# Patient Record
Sex: FEMALE | Race: WHITE | NOT HISPANIC OR LATINO | Employment: OTHER | URBAN - METROPOLITAN AREA
[De-identification: names, ages, dates, MRNs, and addresses within clinical notes are randomized per-mention and may not be internally consistent; named-entity substitution may affect disease eponyms.]

---

## 2017-01-01 PROBLEM — H66.90 OTITIS MEDIA: Status: ACTIVE | Noted: 2017-01-01

## 2017-03-18 DIAGNOSIS — E03.4 HYPOTHYROIDISM DUE TO ACQUIRED ATROPHY OF THYROID: ICD-10-CM

## 2017-03-19 RX ORDER — LEVOTHYROXINE SODIUM 100 UG/1
TABLET ORAL
Qty: 90 TABLET | Refills: 1 | Status: SHIPPED | OUTPATIENT
Start: 2017-03-19 | End: 2017-08-30 | Stop reason: SDUPTHER

## 2017-03-22 ENCOUNTER — TELEPHONE (OUTPATIENT)
Dept: FAMILY MEDICINE | Facility: CLINIC | Age: 67
End: 2017-03-22

## 2017-03-22 DIAGNOSIS — R73.09 ELEVATED GLUCOSE: ICD-10-CM

## 2017-03-22 DIAGNOSIS — E03.9 HYPOTHYROIDISM, UNSPECIFIED TYPE: Primary | ICD-10-CM

## 2017-03-22 NOTE — TELEPHONE ENCOUNTER
----- Message from Consuelo Atwood sent at 3/22/2017 12:21 PM CDT -----  Contact: Self , Call  [phone]  Doctor appointment and lab have been scheduled.  Please link lab orders to the lab appointment.  Date of doctor appointment:  7/19  Physical or EP:  Epp  Date of lab appointment:  7/12    Comments:  Patient ask that when the [employer] mail order is received for her SYNTHROID 100 mcg tablet to give her enough for 90 days until her appointment. She ask that the nurse call her.

## 2017-03-22 NOTE — TELEPHONE ENCOUNTER
Patient advised that the Rx for Levothyroxine was sent on 03/19/17 for 90 day with 1 refill.     Orders in and linked.

## 2017-04-07 ENCOUNTER — OFFICE VISIT (OUTPATIENT)
Dept: FAMILY MEDICINE | Facility: CLINIC | Age: 67
End: 2017-04-07
Payer: MEDICARE

## 2017-04-07 VITALS
HEART RATE: 80 BPM | HEIGHT: 65 IN | WEIGHT: 188.69 LBS | DIASTOLIC BLOOD PRESSURE: 82 MMHG | SYSTOLIC BLOOD PRESSURE: 120 MMHG | TEMPERATURE: 98 F | BODY MASS INDEX: 31.44 KG/M2

## 2017-04-07 DIAGNOSIS — Z91.09 ENVIRONMENTAL ALLERGIES: Primary | ICD-10-CM

## 2017-04-07 PROCEDURE — 99214 OFFICE O/P EST MOD 30 MIN: CPT | Mod: S$GLB,,, | Performed by: NURSE PRACTITIONER

## 2017-04-07 PROCEDURE — 99999 PR PBB SHADOW E&M-EST. PATIENT-LVL IV: CPT | Mod: PBBFAC,,, | Performed by: NURSE PRACTITIONER

## 2017-04-07 PROCEDURE — 1126F AMNT PAIN NOTED NONE PRSNT: CPT | Mod: S$GLB,,, | Performed by: NURSE PRACTITIONER

## 2017-04-07 PROCEDURE — 1159F MED LIST DOCD IN RCRD: CPT | Mod: S$GLB,,, | Performed by: NURSE PRACTITIONER

## 2017-04-07 PROCEDURE — 1157F ADVNC CARE PLAN IN RCRD: CPT | Mod: S$GLB,,, | Performed by: NURSE PRACTITIONER

## 2017-04-07 PROCEDURE — 1160F RVW MEDS BY RX/DR IN RCRD: CPT | Mod: S$GLB,,, | Performed by: NURSE PRACTITIONER

## 2017-04-07 RX ORDER — TRIAMCINOLONE ACETONIDE 55 UG/1
2 SPRAY, METERED NASAL DAILY
Qty: 16 G | Refills: 3 | Status: SHIPPED | OUTPATIENT
Start: 2017-04-07 | End: 2018-01-31 | Stop reason: SDUPTHER

## 2017-04-07 RX ORDER — MONTELUKAST SODIUM 10 MG/1
10 TABLET ORAL NIGHTLY
Qty: 30 TABLET | Refills: 0 | Status: SHIPPED | OUTPATIENT
Start: 2017-04-07 | End: 2017-05-07

## 2017-04-07 NOTE — PROGRESS NOTES
"Subjective:       Patient ID: Lucita Saravia is a 67 y.o. female.    Chief Complaint: Nasal Congestion; Sore Throat; Dizziness; and Pressure Behind the Eyes    HPI Comments: Pt here c/o nasal congestion, sore throat.  Pt states sx x 1 week, after working out in her yard by her oak tree.  Pt occasionally takes Claritin but hasn't been taking daily and has not been using her Flonase- states that it is irritating to her nose      Sore Throat    Associated symptoms include congestion and headaches. Pertinent negatives include no abdominal pain, coughing, diarrhea, drooling, ear discharge, ear pain, neck pain, trouble swallowing or vomiting.   Dizziness:    Associated symptoms: headaches and nausea.no ear pain, no fever, no tinnitus, no vomiting, no light-headedness and no chest pain.    Past Medical History:   Diagnosis Date    Female infertility NEC     History of degenerative disc disease     neck    Hypothyroidism      Past Surgical History:   Procedure Laterality Date    APPENDECTOMY      EYE SURGERY      LASIK    LASIK      PELVIC LAPAROSCOPY      fertillity    TONSILLECTOMY, ADENOIDECTOMY      TOTAL ABDOMINAL HYSTERECTOMY      endometrisosis     Social History     Social History Narrative    Single, lives alone, retired -but works PT serving at [employer]     Family History   Problem Relation Age of Onset    Alzheimer's disease Mother     Stroke Mother     COPD Father     Cancer Sister      breast cancer    Alzheimer's disease Sister     Kidney nephrosis Daughter     Cancer Paternal Grandmother      rectal cancer     Vitals:    04/07/17 0952 04/07/17 1004   BP: (!) 150/86 120/82   Pulse: 80    Temp: 97.8 °F (36.6 °C)    TempSrc: Oral    Weight: 85.6 kg (188 lb 11.4 oz)    Height: 5' 4.5" (1.638 m)    PainSc: 0-No pain      Outpatient Encounter Prescriptions as of 4/7/2017   Medication Sig Dispense Refill    b complex vitamins capsule Take 1 capsule by mouth once daily.      CYANOCOBALAMIN, " "VITAMIN B-12, (VITAMIN B-12 SL) Place under the tongue.      ERGOCALCIFEROL, VITAMIN D2, (VITAMIN D ORAL) Take 400 mg by mouth.      fish oil-omega-3 fatty acids 300-1,000 mg capsule Take 2 g by mouth once daily.      fluticasone (FLONASE) 50 mcg/actuation nasal spray       loratadine (CLARITIN) 10 mg tablet Take 10 mg by mouth once daily.      multivitamin capsule Take 1 capsule by mouth once daily.      SYNTHROID 100 mcg tablet TAKE 1 TABLET EVERY MORNING 90 tablet 1    VIT C/VIT E/LUTEIN/MIN/OMEGA-3 (OCUVITE ORAL) Take by mouth.      fexofenadine-pseudoephedrine (ALLEGRA-D 24) 180-240 mg per 24 hr tablet Take 1 tablet by mouth once daily.      montelukast (SINGULAIR) 10 mg tablet Take 1 tablet (10 mg total) by mouth every evening. 30 tablet 0    promethazine (PHENERGAN) 6.25 mg/5 mL syrup Take 5 mLs (6.25 mg total) by mouth every 6 (six) hours as needed for Nausea. 240 mL 0    triamcinolone (NASACORT) 55 mcg nasal inhaler 2 sprays by Nasal route once daily. 16 g 3     No facility-administered encounter medications on file as of 4/7/2017.      Wt Readings from Last 3 Encounters:   04/07/17 85.6 kg (188 lb 11.4 oz)   12/29/16 83.2 kg (183 lb 6.8 oz)   07/12/16 84.7 kg (186 lb 11.7 oz)     Last 3 sets of Vitals    Vitals - 1 value per visit 7/12/2016 12/29/2016 4/7/2017   SYSTOLIC 126 138 120   DIASTOLIC 82 76 82   PULSE 64 80 80   TEMPERATURE 97.8 97.8 97.8   Weight (lb) 186.73 183.42 188.71   Weight (kg) 84.7 83.2 85.6   HEIGHT 5' 5.5" 5' 4.5" 5' 4.5"   BODY MASS INDEX 30.6 31 31.89   VISIT REPORT - - -   Pain Score  4 0 0     No results found for: CBC  Review of Systems   Constitutional: Negative for chills, fatigue and fever.   HENT: Positive for congestion, postnasal drip, rhinorrhea, sinus pressure and sore throat. Negative for drooling, ear discharge, ear pain, sneezing, tinnitus, trouble swallowing and voice change.    Respiratory: Negative for cough and chest tightness.    Cardiovascular: " Negative for chest pain.   Gastrointestinal: Positive for nausea. Negative for abdominal pain, diarrhea and vomiting.   Musculoskeletal: Negative for arthralgias, myalgias, neck pain and neck stiffness.   Skin: Negative for rash.   Neurological: Positive for dizziness and headaches. Negative for facial asymmetry and light-headedness.   Hematological: Negative for adenopathy.   Psychiatric/Behavioral: Negative for sleep disturbance.       Objective:      Physical Exam   Constitutional: She appears well-developed and well-nourished. No distress.   HENT:   Nose: Mucosal edema and rhinorrhea present. Right sinus exhibits no maxillary sinus tenderness and no frontal sinus tenderness. Left sinus exhibits no maxillary sinus tenderness and no frontal sinus tenderness.   Eyes: Conjunctivae are normal. Pupils are equal, round, and reactive to light. Right eye exhibits no discharge. Left eye exhibits no discharge.   Cardiovascular: Normal rate, regular rhythm, normal heart sounds and intact distal pulses.    Pulmonary/Chest: Effort normal and breath sounds normal. No stridor. No respiratory distress. She has no wheezes.   Abdominal: Soft. She exhibits no distension.   Lymphadenopathy:     She has no cervical adenopathy.   Neurological: She is alert.   Skin: Skin is warm and dry. No rash noted. She is not diaphoretic. No erythema. No pallor.   Psychiatric: She has a normal mood and affect. Her behavior is normal. Thought content normal.   Nursing note and vitals reviewed.      Assessment:       1. Environmental allergies        Plan:       Try Nasacort to see if less irritating that Flonase  Singulair at night  Continue Claritin and sudafed  Increase oral fluids  Lucita was seen today for nasal congestion, sore throat, dizziness and pressure behind the eyes.    Diagnoses and all orders for this visit:    Environmental allergies  -     triamcinolone (NASACORT) 55 mcg nasal inhaler; 2 sprays by Nasal route once daily.  -      montelukast (SINGULAIR) 10 mg tablet; Take 1 tablet (10 mg total) by mouth every evening.      Patient Instructions       Causes of Nasal Allergies  Nasal allergies are most commonly caused by one or more of 4 kinds of allergens: pollen (which causes seasonal allergies), house-dust mites, mold, and animals. Other substances, called irritants, can bother the nose and make allergy symptoms worse.    Pollen  Plants reproduce by moving tiny grains of pollen from plant to plant. Some pollen is carried by bees, and some is blown by the wind. Its the wind-blown pollen that causes nasal allergies. The amount of pollen in the air varies from season to season.  House-dust mites  House-dust mites are tiny bugs too small to see. They can live in mattresses, blankets, stuffed toys, carpets, and curtains. The droppings of these mites are a common indoor cause of nasal allergies.  Mold  Mold loves dark, damp areas. It tends to grow in bathrooms, basements, refrigerators, and in the soil of houseplants. Mold reproduces by sending tiny grains called spores into the air. If these spores are breathed in, they can cause a nasal allergic reaction.  Animals  Pets, such as cats, dogs, birds, horses, and rabbits, are common causes of nasal allergies. Flakes of skin (dander), saliva left on fur when an animal cleans itself, urine in litter boxes and cages, and feathers can all cause nasal allergies.  Irritants make allergies worse  Although irritants dont cause nasal allergies, they can make allergy symptoms worse. Cigarette smoke, perfume, aerosol sprays, smoke from wood stoves or fireplaces, car exhaust, and strong odors are examples of irritants.   Date Last Reviewed: 9/1/2016  © 6748-9389 [employer]. [address] [place] [address], [place] [address]. All rights reserved. This information is not intended as a substitute for professional medical care. Always follow your healthcare professional's instructions.

## 2017-04-07 NOTE — MR AVS SNAPSHOT
[facility]  [address] [facility], [address]  [facility] [address]  Phone: [phone]  Fax: [phone]                  Lucita Saravia   2017 10:00 AM   Office Visit    Description:  Female : 1950   Provider:  MARTHA Champagne   Department:  [facility]           Reason for Visit     Nasal Congestion     Sore Throat     Dizziness     Pressure Behind the Eyes           Diagnoses this Visit        Comments    Environmental allergies    -  Primary            To Do List           Future Appointments        Provider Department Dept Phone    2017 8:00 AM LAB, [facility] - Laboratory [phone]    2017 2:00 PM Grace Camargo,  [facility] [phone]      Goals (5 Years of Data)     None       These Medications        Disp Refills Start End    triamcinolone (NASACORT) 55 mcg nasal inhaler 16 g 3 2017     2 sprays by Nasal route once daily. - Nasal    Pharmacy: [facility] - [place], [place] - [address] [facility] #: [phone]       montelukast (SINGULAIR) 10 mg tablet 30 tablet 0 2017    Take 1 tablet (10 mg total) by mouth every evening. - Oral    Pharmacy: [facility] - [place], [place] - [address] [facility] #: [phone]         [facility]     [facility] -  Assistance  Unless otherwise directed by your provider, please contact [facility], our nurse care line that is available for  assistance.     Registered nurses in the [facility] provide: appointment scheduling, clinical advisement, health education, and other advisory services.  Call: [phone] (toll free)               Medications           Message regarding Medications     Verify the changes and/or additions to your medication regime listed below are the same as discussed with your clinician today.  If any of these changes or additions are incorrect, please notify your healthcare  "provider.        START taking these NEW medications        Refills    triamcinolone (NASACORT) 55 mcg nasal inhaler 3    Si sprays by Nasal route once daily.    Class: Normal    Route: Nasal    montelukast (SINGULAIR) 10 mg tablet 0    Sig: Take 1 tablet (10 mg total) by mouth every evening.    Class: Normal    Route: Oral           Verify that the below list of medications is an accurate representation of the medications you are currently taking.  If none reported, the list may be blank. If incorrect, please contact your healthcare provider. Carry this list with you in case of emergency.           Current Medications     b complex vitamins capsule Take 1 capsule by mouth once daily.    CYANOCOBALAMIN, VITAMIN B-12, (VITAMIN B-12 SL) Place under the tongue.    ERGOCALCIFEROL, VITAMIN D2, (VITAMIN D ORAL) Take 400 mg by mouth.    fish oil-omega-3 fatty acids 300-1,000 mg capsule Take 2 g by mouth once daily.    fluticasone (FLONASE) 50 mcg/actuation nasal spray     loratadine (CLARITIN) 10 mg tablet Take 10 mg by mouth once daily.    multivitamin capsule Take 1 capsule by mouth once daily.    SYNTHROID 100 mcg tablet TAKE 1 TABLET EVERY MORNING    VIT C/VIT E/LUTEIN/MIN/OMEGA-3 (OCUVITE ORAL) Take by mouth.    fexofenadine-pseudoephedrine (ALLEGRA-D 24) 180-240 mg per 24 hr tablet Take 1 tablet by mouth once daily.    montelukast (SINGULAIR) 10 mg tablet Take 1 tablet (10 mg total) by mouth every evening.    promethazine (PHENERGAN) 6.25 mg/5 mL syrup Take 5 mLs (6.25 mg total) by mouth every 6 (six) hours as needed for Nausea.    triamcinolone (NASACORT) 55 mcg nasal inhaler 2 sprays by Nasal route once daily.           Clinical Reference Information           Your Vitals Were     BP Pulse Temp    120/82 (BP Location: Right arm, Patient Position: Sitting, BP Method: Manual) 80 97.8 °F (36.6 °C) (Oral)    Height Weight BMI    5' 4.5" (1.638 m) 85.6 kg (188 lb 11.4 oz) 31.89 kg/m2      Blood Pressure          Most " Recent Value    BP  120/82      Allergies as of 4/7/2017     No Known Allergies      Immunizations Administered on Date of Encounter - 4/7/2017     None      Instructions      Causes of Nasal Allergies  Nasal allergies are most commonly caused by one or more of 4 kinds of allergens: pollen (which causes seasonal allergies), house-dust mites, mold, and animals. Other substances, called irritants, can bother the nose and make allergy symptoms worse.    Pollen  Plants reproduce by moving tiny grains of pollen from plant to plant. Some pollen is carried by bees, and some is blown by the wind. Its the wind-blown pollen that causes nasal allergies. The amount of pollen in the air varies from season to season.  House-dust mites  House-dust mites are tiny bugs too small to see. They can live in mattresses, blankets, stuffed toys, carpets, and curtains. The droppings of these mites are a common indoor cause of nasal allergies.  Mold  Mold loves dark, damp areas. It tends to grow in bathrooms, basements, refrigerators, and in the soil of houseplants. Mold reproduces by sending tiny grains called spores into the air. If these spores are breathed in, they can cause a nasal allergic reaction.  Animals  Pets, such as cats, dogs, birds, horses, and rabbits, are common causes of nasal allergies. Flakes of skin (dander), saliva left on fur when an animal cleans itself, urine in litter boxes and cages, and feathers can all cause nasal allergies.  Irritants make allergies worse  Although irritants dont cause nasal allergies, they can make allergy symptoms worse. Cigarette smoke, perfume, aerosol sprays, smoke from wood stoves or fireplaces, car exhaust, and strong odors are examples of irritants.   Date Last Reviewed: 9/1/2016  © 6226-0971 [employer]. [address] [place] [address], [place] [address]. All rights reserved. This information is not intended as a substitute for professional medical care. Always follow your  healthcare professional's instructions.             Language Assistance Services     ATTENTION: Language assistance services are available, free of charge. Please call [phone].      ATENCIÓN: Si habla [place], tiene a blancas disposición servicios gratuitos de asistencia lingüística. Llame al [phone].     CHÚ Ý: N?u b?n nói Ti?ng Vi?t, có các d?ch v? h? tr? ngôn ng? mi?n phí dành cho b?n. G?i s? [phone].         [facility] complies with applicable Federal civil rights laws and does not discriminate on the basis of race, color, national origin, age, disability, or sex.

## 2017-04-07 NOTE — PATIENT INSTRUCTIONS
Causes of Nasal Allergies  Nasal allergies are most commonly caused by one or more of 4 kinds of allergens: pollen (which causes seasonal allergies), house-dust mites, mold, and animals. Other substances, called irritants, can bother the nose and make allergy symptoms worse.    Pollen  Plants reproduce by moving tiny grains of pollen from plant to plant. Some pollen is carried by bees, and some is blown by the wind. Its the wind-blown pollen that causes nasal allergies. The amount of pollen in the air varies from season to season.  House-dust mites  House-dust mites are tiny bugs too small to see. They can live in mattresses, blankets, stuffed toys, carpets, and curtains. The droppings of these mites are a common indoor cause of nasal allergies.  Mold  Mold loves dark, damp areas. It tends to grow in bathrooms, basements, refrigerators, and in the soil of houseplants. Mold reproduces by sending tiny grains called spores into the air. If these spores are breathed in, they can cause a nasal allergic reaction.  Animals  Pets, such as cats, dogs, birds, horses, and rabbits, are common causes of nasal allergies. Flakes of skin (dander), saliva left on fur when an animal cleans itself, urine in litter boxes and cages, and feathers can all cause nasal allergies.  Irritants make allergies worse  Although irritants dont cause nasal allergies, they can make allergy symptoms worse. Cigarette smoke, perfume, aerosol sprays, smoke from wood stoves or fireplaces, car exhaust, and strong odors are examples of irritants.   Date Last Reviewed: 9/1/2016  © 0690-4823 [employer]. [address] [place] [address], [place] [address]. All rights reserved. This information is not intended as a substitute for professional medical care. Always follow your healthcare professional's instructions.

## 2017-04-08 DIAGNOSIS — H66.90 OTITIS, UNSPECIFIED LATERALITY: ICD-10-CM

## 2017-04-08 DIAGNOSIS — J32.9 SINUSITIS, UNSPECIFIED CHRONICITY, UNSPECIFIED LOCATION: ICD-10-CM

## 2017-04-09 RX ORDER — AMOXICILLIN 875 MG/1
TABLET, FILM COATED ORAL
Qty: 20 TABLET | Refills: 0 | OUTPATIENT
Start: 2017-04-09

## 2017-04-10 DIAGNOSIS — J32.9 SINUSITIS, UNSPECIFIED CHRONICITY, UNSPECIFIED LOCATION: ICD-10-CM

## 2017-04-10 DIAGNOSIS — H66.90 OTITIS, UNSPECIFIED LATERALITY: ICD-10-CM

## 2017-04-10 RX ORDER — AMOXICILLIN 875 MG/1
TABLET, FILM COATED ORAL
Qty: 20 TABLET | Refills: 0 | OUTPATIENT
Start: 2017-04-10

## 2017-04-11 DIAGNOSIS — J32.9 SINUSITIS, UNSPECIFIED CHRONICITY, UNSPECIFIED LOCATION: ICD-10-CM

## 2017-04-11 DIAGNOSIS — H66.90 OTITIS, UNSPECIFIED LATERALITY: ICD-10-CM

## 2017-04-11 RX ORDER — AMOXICILLIN 875 MG/1
TABLET, FILM COATED ORAL
Qty: 20 TABLET | Refills: 0 | OUTPATIENT
Start: 2017-04-11

## 2017-04-27 ENCOUNTER — OFFICE VISIT (OUTPATIENT)
Dept: FAMILY MEDICINE | Facility: CLINIC | Age: 67
End: 2017-04-27
Payer: MEDICARE

## 2017-04-27 VITALS
OXYGEN SATURATION: 99 % | SYSTOLIC BLOOD PRESSURE: 120 MMHG | WEIGHT: 187.81 LBS | HEIGHT: 66 IN | DIASTOLIC BLOOD PRESSURE: 68 MMHG | BODY MASS INDEX: 30.18 KG/M2 | HEART RATE: 76 BPM | TEMPERATURE: 98 F

## 2017-04-27 DIAGNOSIS — J06.9 UPPER RESPIRATORY TRACT INFECTION, UNSPECIFIED TYPE: Primary | ICD-10-CM

## 2017-04-27 PROCEDURE — 1159F MED LIST DOCD IN RCRD: CPT | Mod: S$GLB,,, | Performed by: NURSE PRACTITIONER

## 2017-04-27 PROCEDURE — 99214 OFFICE O/P EST MOD 30 MIN: CPT | Mod: S$GLB,,, | Performed by: NURSE PRACTITIONER

## 2017-04-27 PROCEDURE — 1160F RVW MEDS BY RX/DR IN RCRD: CPT | Mod: S$GLB,,, | Performed by: NURSE PRACTITIONER

## 2017-04-27 PROCEDURE — 99999 PR PBB SHADOW E&M-EST. PATIENT-LVL IV: CPT | Mod: PBBFAC,,, | Performed by: NURSE PRACTITIONER

## 2017-04-27 NOTE — PATIENT INSTRUCTIONS
Continue Claritin and  Sudafed    OK to use Afrin for 3-5 days    OK to take the Promethazine syrup for cough    Call if not better by Monday

## 2017-04-27 NOTE — PROGRESS NOTES
"Subjective:       Patient ID: Lucita Saravia is a 67 y.o. female.    Chief Complaint: Cough and Sinus Problem    HPI Comments: Pt here c/o cough that started within the week and nasal congestion.  Pt denies sore throat, n/v/d/fever or chills.  Pt has been taking Claritin, Sudafed and it is helping.        Cough   Associated symptoms include postnasal drip and rhinorrhea. Pertinent negatives include no chest pain, chills, fever, headaches, rash, sore throat or shortness of breath.   Sinus Problem   Associated symptoms include congestion and coughing. Pertinent negatives include no chills, headaches, neck pain, shortness of breath, sinus pressure or sore throat.     Past Medical History:   Diagnosis Date    Female infertility NEC     History of degenerative disc disease     neck    Hypothyroidism      Past Surgical History:   Procedure Laterality Date    APPENDECTOMY      EYE SURGERY      LASIK    LASIK      PELVIC LAPAROSCOPY      fertillity    TONSILLECTOMY, ADENOIDECTOMY      TOTAL ABDOMINAL HYSTERECTOMY      endometrisosis     Social History     Social History Narrative    Single, lives alone, retired -but works PT serving at [employer]     Family History   Problem Relation Age of Onset    Alzheimer's disease Mother     Stroke Mother     COPD Father     Cancer Sister      breast cancer    Alzheimer's disease Sister     Kidney nephrosis Daughter     Cancer Paternal Grandmother      rectal cancer     Vitals:    04/27/17 1503   BP: 120/68   Pulse: 76   Temp: 98.4 °F (36.9 °C)   TempSrc: Oral   Weight: 85.2 kg (187 lb 13.3 oz)   Height: 5' 5.5" (1.664 m)     Outpatient Encounter Prescriptions as of 4/27/2017   Medication Sig Dispense Refill    b complex vitamins capsule Take 1 capsule by mouth once daily.      CYANOCOBALAMIN, VITAMIN B-12, (VITAMIN B-12 SL) Place under the tongue.      ERGOCALCIFEROL, VITAMIN D2, (VITAMIN D ORAL) Take 400 mg by mouth.      fish oil-omega-3 fatty acids 300-1,000 mg " "capsule Take 2 g by mouth once daily.      fluticasone (FLONASE) 50 mcg/actuation nasal spray       montelukast (SINGULAIR) 10 mg tablet Take 1 tablet (10 mg total) by mouth every evening. 30 tablet 0    multivitamin capsule Take 1 capsule by mouth once daily.      SYNTHROID 100 mcg tablet TAKE 1 TABLET EVERY MORNING 90 tablet 1    triamcinolone (NASACORT) 55 mcg nasal inhaler 2 sprays by Nasal route once daily. 16 g 3    VIT C/VIT E/LUTEIN/MIN/OMEGA-3 (OCUVITE ORAL) Take by mouth.      fexofenadine-pseudoephedrine (ALLEGRA-D 24) 180-240 mg per 24 hr tablet Take 1 tablet by mouth once daily.      loratadine (CLARITIN) 10 mg tablet Take 10 mg by mouth once daily.      promethazine (PHENERGAN) 6.25 mg/5 mL syrup Take 5 mLs (6.25 mg total) by mouth every 6 (six) hours as needed for Nausea. 240 mL 0     No facility-administered encounter medications on file as of 4/27/2017.      Wt Readings from Last 3 Encounters:   04/27/17 85.2 kg (187 lb 13.3 oz)   04/07/17 85.6 kg (188 lb 11.4 oz)   12/29/16 83.2 kg (183 lb 6.8 oz)     Last 3 sets of Vitals    Vitals - 1 value per visit 12/29/2016 4/7/2017 4/27/2017   SYSTOLIC 138 120 120   DIASTOLIC 76 82 68   PULSE 80 80 76   TEMPERATURE 97.8 97.8 98.4   Weight (lb) 183.42 188.71 187.83   Weight (kg) 83.2 85.6 85.2   HEIGHT 5' 4.5" 5' 4.5" 5' 5.5"   BODY MASS INDEX 31 31.89 30.78   VISIT REPORT - - -   Pain Score  0 0 -     No results found for: CBC  Review of Systems   Constitutional: Negative for chills, fatigue and fever.   HENT: Positive for congestion, postnasal drip and rhinorrhea. Negative for drooling, sinus pressure, sore throat, tinnitus, trouble swallowing and voice change.    Respiratory: Positive for cough. Negative for shortness of breath.    Cardiovascular: Negative for chest pain.   Gastrointestinal: Negative for abdominal pain, constipation, diarrhea, nausea and vomiting.   Musculoskeletal: Negative for arthralgias, back pain and neck pain.   Skin: " Negative for rash.   Neurological: Negative for dizziness, facial asymmetry and headaches.   Hematological: Negative for adenopathy.   Psychiatric/Behavioral: Negative for sleep disturbance.       Objective:      Physical Exam   Constitutional: She is oriented to person, place, and time. She appears well-developed and well-nourished. No distress.   HENT:   Head: Normocephalic and atraumatic.   Right Ear: External ear normal.   Left Ear: External ear normal.   Nose: Mucosal edema and rhinorrhea present.   Mouth/Throat: Oropharynx is clear and moist. No oropharyngeal exudate.   Clear rhinorrhea     Eyes: Pupils are equal, round, and reactive to light. Right eye exhibits no discharge. Left eye exhibits no discharge.   Neck: Normal range of motion.   Cardiovascular: Normal rate, regular rhythm, normal heart sounds and intact distal pulses.    Pulmonary/Chest: Effort normal and breath sounds normal. No stridor. No respiratory distress.   Abdominal: Soft.   Lymphadenopathy:     She has no cervical adenopathy.   Neurological: She is alert and oriented to person, place, and time.   Skin: Skin is warm and dry. No rash noted. She is not diaphoretic. No erythema. No pallor.   Psychiatric: She has a normal mood and affect. Her behavior is normal. Judgment and thought content normal.   Nursing note and vitals reviewed.      Assessment:       1. Upper respiratory tract infection, unspecified type        Plan:       Lucita was seen today for cough and sinus problem.    Diagnoses and all orders for this visit:    Upper respiratory tract infection, unspecified type  Comments:  improving on it's own        Patient Instructions   Continue Claritin and  Sudafed    OK to use Afrin for 3-5 days    OK to take the Promethazine syrup for cough    Call if not better by Monday

## 2017-04-27 NOTE — MR AVS SNAPSHOT
[facility]  [address] [facility], [address]  [facility] [address]  Phone: [phone]  Fax: [phone]                  Lucita Saravia   2017 3:00 PM   Office Visit    Description:  Female : 1950   Provider:  MARTHA Champagne   Department:  [facility]           Reason for Visit     Cough     Sinus Problem                To Do List           Future Appointments        Provider Department Dept Phone    2017 8:00 AM LAB, [facility] [phone]    2017 2:00 PM Grace Camargo DO [facility] [phone]      Goals (5 Years of Data)     None      Follow-Up and Disposition     Return if symptoms worsen or fail to improve.      [facility] On Call     [facility] On Call Nurse Care Line -  Assistance  Unless otherwise directed by your provider, please contact [facility], our nurse care line that is available for  assistance.     Registered nurses in the [facility] provide: appointment scheduling, clinical advisement, health education, and other advisory services.  Call: [phone] (toll free)               Medications           Message regarding Medications     Verify the changes and/or additions to your medication regime listed below are the same as discussed with your clinician today.  If any of these changes or additions are incorrect, please notify your healthcare provider.             Verify that the below list of medications is an accurate representation of the medications you are currently taking.  If none reported, the list may be blank. If incorrect, please contact your healthcare provider. Carry this list with you in case of emergency.           Current Medications     b complex vitamins capsule Take 1 capsule by mouth once daily.    CYANOCOBALAMIN, VITAMIN B-12, (VITAMIN B-12 SL) Place under the tongue.    ERGOCALCIFEROL, VITAMIN D2, (VITAMIN D ORAL) Take 400 mg by mouth.     "fish oil-omega-3 fatty acids 300-1,000 mg capsule Take 2 g by mouth once daily.    fluticasone (FLONASE) 50 mcg/actuation nasal spray     montelukast (SINGULAIR) 10 mg tablet Take 1 tablet (10 mg total) by mouth every evening.    multivitamin capsule Take 1 capsule by mouth once daily.    SYNTHROID 100 mcg tablet TAKE 1 TABLET EVERY MORNING    triamcinolone (NASACORT) 55 mcg nasal inhaler 2 sprays by Nasal route once daily.    VIT C/VIT E/LUTEIN/MIN/OMEGA-3 (OCUVITE ORAL) Take by mouth.    fexofenadine-pseudoephedrine (ALLEGRA-D 24) 180-240 mg per 24 hr tablet Take 1 tablet by mouth once daily.    loratadine (CLARITIN) 10 mg tablet Take 10 mg by mouth once daily.    promethazine (PHENERGAN) 6.25 mg/5 mL syrup Take 5 mLs (6.25 mg total) by mouth every 6 (six) hours as needed for Nausea.           Clinical Reference Information           Your Vitals Were     BP Pulse Temp Height Weight BMI    120/68 76 98.4 °F (36.9 °C) (Oral) 5' 5.5" (1.664 m) 85.2 kg (187 lb 13.3 oz) 30.78 kg/m2      Blood Pressure          Most Recent Value    BP  120/68      Allergies as of 4/27/2017     No Known Allergies      Immunizations Administered on Date of Encounter - 4/27/2017     None      Instructions    Continue Claritin and  Sudafed    OK to use Afrin for 3-5 days    OK to take the Promethazine syrup for cough    Call if not better by Monday           Language Assistance Services     ATTENTION: Language assistance services are available, free of charge. Please call [phone].      ATENCIÓN: Si habla español, tiene a blancas disposición servicios gratuitos de asistencia lingüística. Llleopoldo al [phone].     JACKLYN Ý: N?u b?n nói Ti?ng Vi?t, có các d?ch v? h? tr? ngôn ng? mi?n phí dành cho b?n. G?i s? [phone].         [facility] complies with applicable Federal civil rights laws and does not discriminate on the basis of race, color, national origin, age, disability, or sex.        "

## 2017-05-25 ENCOUNTER — OFFICE VISIT (OUTPATIENT)
Dept: INTERNAL MEDICINE | Facility: CLINIC | Age: 67
End: 2017-05-25
Payer: MEDICARE

## 2017-05-25 VITALS
WEIGHT: 184.75 LBS | SYSTOLIC BLOOD PRESSURE: 138 MMHG | HEIGHT: 66 IN | HEART RATE: 80 BPM | DIASTOLIC BLOOD PRESSURE: 86 MMHG | BODY MASS INDEX: 29.69 KG/M2 | RESPIRATION RATE: 10 BRPM

## 2017-05-25 DIAGNOSIS — G47.00 INSOMNIA, UNSPECIFIED TYPE: ICD-10-CM

## 2017-05-25 DIAGNOSIS — Z91.09 ENVIRONMENTAL ALLERGIES: Primary | ICD-10-CM

## 2017-05-25 PROCEDURE — 99999 PR PBB SHADOW E&M-EST. PATIENT-LVL III: CPT | Mod: PBBFAC,,, | Performed by: INTERNAL MEDICINE

## 2017-05-25 PROCEDURE — 99214 OFFICE O/P EST MOD 30 MIN: CPT | Mod: 59,S$GLB,, | Performed by: INTERNAL MEDICINE

## 2017-05-25 PROCEDURE — 1126F AMNT PAIN NOTED NONE PRSNT: CPT | Mod: S$GLB,,, | Performed by: INTERNAL MEDICINE

## 2017-05-25 PROCEDURE — 96372 THER/PROPH/DIAG INJ SC/IM: CPT | Mod: S$GLB,,, | Performed by: INTERNAL MEDICINE

## 2017-05-25 PROCEDURE — 1159F MED LIST DOCD IN RCRD: CPT | Mod: S$GLB,,, | Performed by: INTERNAL MEDICINE

## 2017-05-25 RX ORDER — DESLORATADINE 5 MG/1
5 TABLET ORAL DAILY
Qty: 30 TABLET | Refills: 5 | Status: SHIPPED | OUTPATIENT
Start: 2017-05-25 | End: 2018-04-20

## 2017-05-25 RX ORDER — TRIAMCINOLONE ACETONIDE 40 MG/ML
40 INJECTION, SUSPENSION INTRA-ARTICULAR; INTRAMUSCULAR
Status: COMPLETED | OUTPATIENT
Start: 2017-05-25 | End: 2017-05-25

## 2017-05-25 RX ORDER — MONTELUKAST SODIUM 10 MG/1
10 TABLET ORAL NIGHTLY
Qty: 30 TABLET | Refills: 5 | Status: SHIPPED | OUTPATIENT
Start: 2017-05-25 | End: 2017-06-24

## 2017-05-25 RX ORDER — AZELASTINE 1 MG/ML
1 SPRAY, METERED NASAL 2 TIMES DAILY
Qty: 30 ML | Refills: 5 | Status: SHIPPED | OUTPATIENT
Start: 2017-05-25 | End: 2018-01-31

## 2017-05-25 RX ADMIN — TRIAMCINOLONE ACETONIDE 40 MG: 40 INJECTION, SUSPENSION INTRA-ARTICULAR; INTRAMUSCULAR at 03:05

## 2017-05-25 NOTE — PROGRESS NOTES
Subjective:       Patient ID: Lucita Saravia is a 67 y.o. female.    Chief Complaint: Otalgia and Cough    HPI     67-year-old female here for evaluation of a ear infection and coughing.  She has felt tired and saw the doctor multiple times since January. She has had URIs.  She was treated with amoxil.  She has seen the NP at [place].  She was told it was a virus.  She feels tired.  Her right ear is bothering her.  She has been given an NSAIDs as well as nasocort.  She has been on singulair as well.  This helped to take the pressure from her sinuses.  She has taken sudafed and claritin as well.  She was told that she has a dysfunction eustachian tube by an ENT.  She has had fatigue that resolved after seeing Dr. Larson.  She felt better until April when she saw the NP.  She reports that she sleeps 5-8 hours a night.  She does this more than 8 hours.  She does this 4 times in a week.  She has been doing this the last year or so.  She sometimes takes melatonin and sometimes not.  She reports that reading puts her to sleep.  She walks a little bit.     Review of Systems    Objective:      Physical Exam   Constitutional: She is oriented to person, place, and time. She appears well-developed and well-nourished.   HENT:   Head: Normocephalic and atraumatic.   Mouth/Throat: No oropharyngeal exudate.   Eyes: EOM are normal. Pupils are equal, round, and reactive to light. Right eye exhibits no discharge. Left eye exhibits no discharge. No scleral icterus.   Neck: Normal range of motion. Neck supple. No tracheal deviation present. No thyromegaly present.   Cardiovascular: Normal rate, regular rhythm and normal heart sounds.  Exam reveals no gallop and no friction rub.    No murmur heard.  Pulmonary/Chest: Effort normal and breath sounds normal. No respiratory distress. She has no wheezes. She has no rales. She exhibits no tenderness.   Abdominal: Soft. Bowel sounds are normal. She exhibits no distension and no mass. There  is no tenderness. There is no rebound and no guarding.   Musculoskeletal: Normal range of motion. She exhibits no edema or tenderness.   Neurological: She is alert and oriented to person, place, and time.   Skin: Skin is warm and dry. No rash noted. No erythema. No pallor.   Psychiatric: She has a normal mood and affect. Her behavior is normal.   Vitals reviewed.      Assessment:       1. Environmental allergies    2. Insomnia, unspecified type        Plan:       1.  Singulair 10 mg, Clarinex 5 mg, Nasacort, Astelin.  Kenalog 40 mg IM ×1.  Consider ENT referral if no relief.  2.  Patient advised to stop watching TV before going to sleep, try to get 8 hours of sleep at night instead of 5 hours a night.  Patient advised to read books before going to sleep.  Advised to get regular exercise 5 days a week, 30 minute today.

## 2017-07-13 ENCOUNTER — OFFICE VISIT (OUTPATIENT)
Dept: FAMILY MEDICINE | Facility: CLINIC | Age: 67
End: 2017-07-13
Payer: MEDICARE

## 2017-07-13 VITALS
WEIGHT: 183 LBS | HEIGHT: 65 IN | DIASTOLIC BLOOD PRESSURE: 74 MMHG | SYSTOLIC BLOOD PRESSURE: 136 MMHG | BODY MASS INDEX: 30.49 KG/M2 | TEMPERATURE: 98 F

## 2017-07-13 DIAGNOSIS — J32.9 SINUSITIS, UNSPECIFIED CHRONICITY, UNSPECIFIED LOCATION: Primary | ICD-10-CM

## 2017-07-13 DIAGNOSIS — H66.009: ICD-10-CM

## 2017-07-13 DIAGNOSIS — L03.90 CELLULITIS, UNSPECIFIED CELLULITIS SITE: ICD-10-CM

## 2017-07-13 DIAGNOSIS — H60.90 OTITIS EXTERNA, UNSPECIFIED CHRONICITY, UNSPECIFIED LATERALITY, UNSPECIFIED TYPE: ICD-10-CM

## 2017-07-13 PROCEDURE — 1125F AMNT PAIN NOTED PAIN PRSNT: CPT | Mod: S$GLB,,, | Performed by: FAMILY MEDICINE

## 2017-07-13 PROCEDURE — 1159F MED LIST DOCD IN RCRD: CPT | Mod: S$GLB,,, | Performed by: FAMILY MEDICINE

## 2017-07-13 PROCEDURE — 99999 PR PBB SHADOW E&M-EST. PATIENT-LVL III: CPT | Mod: PBBFAC,,, | Performed by: FAMILY MEDICINE

## 2017-07-13 PROCEDURE — 99214 OFFICE O/P EST MOD 30 MIN: CPT | Mod: S$GLB,,, | Performed by: FAMILY MEDICINE

## 2017-07-13 RX ORDER — SULFAMETHOXAZOLE AND TRIMETHOPRIM 800; 160 MG/1; MG/1
1 TABLET ORAL 2 TIMES DAILY
Qty: 20 TABLET | Refills: 0 | Status: SHIPPED | OUTPATIENT
Start: 2017-07-13 | End: 2017-09-25

## 2017-07-13 RX ORDER — PSEUDOEPHEDRINE HCL 30 MG
30 TABLET ORAL 2 TIMES DAILY PRN
Qty: 10 TABLET | Refills: 0 | Status: SHIPPED | OUTPATIENT
Start: 2017-07-13 | End: 2017-07-23

## 2017-07-13 RX ORDER — NEOMYCIN SULFATE, POLYMYXIN B SULFATE AND HYDROCORTISONE 10; 3.5; 1 MG/ML; MG/ML; [USP'U]/ML
3 SUSPENSION/ DROPS AURICULAR (OTIC) 3 TIMES DAILY
Qty: 10 ML | Refills: 0 | Status: SHIPPED | OUTPATIENT
Start: 2017-07-13 | End: 2018-01-31

## 2017-07-13 NOTE — PROGRESS NOTES
Subjective:       Patient ID: Lucita Saravia is a 67 y.o. female.    Chief Complaint: Otalgia (right ear)   patient is suffering with recurrent right ear pain since December of last year.  She also complaining of posterior lymphadenopathy that is tender to palpation, sinus congestion  Sinus pain and pressure patient denies chills or fever at this time  HPI the above  Review of Systems   HENT: Positive for congestion, ear pain, postnasal drip, rhinorrhea and sinus pressure.    Eyes: Negative.    Respiratory: Negative.    Cardiovascular: Negative.    Gastrointestinal: Negative.    Endocrine: Negative.    Genitourinary: Negative.    Musculoskeletal: Negative.    Skin: Negative.    Allergic/Immunologic: Negative.    Neurological: Negative.    Hematological: Positive for adenopathy.   Psychiatric/Behavioral: Negative.        Objective:      Physical Exam   Constitutional: She is oriented to person, place, and time. She appears well-developed and well-nourished. No distress.   HENT:   Head: Normocephalic and atraumatic.   Right Ear: Hearing and external ear normal. There is swelling. Tympanic membrane is injected and erythematous.   Left Ear: Hearing, tympanic membrane, external ear and ear canal normal.   Nose: Right sinus exhibits maxillary sinus tenderness and frontal sinus tenderness. Left sinus exhibits maxillary sinus tenderness and frontal sinus tenderness.   Mouth/Throat: Oropharynx is clear and moist.   Eyes: Conjunctivae and EOM are normal. Pupils are equal, round, and reactive to light.   Neck: Normal range of motion. Neck supple. No thyromegaly present.   Cardiovascular: Normal rate, regular rhythm, normal heart sounds and intact distal pulses.    Pulmonary/Chest: Effort normal and breath sounds normal. No respiratory distress. She has no wheezes.   Abdominal: Soft. Bowel sounds are normal. She exhibits no distension. There is no tenderness.   Lymphadenopathy:        Head (right side): Posterior auricular and  occipital adenopathy present.   Neurological: She is alert and oriented to person, place, and time. She has normal reflexes. She displays normal reflexes. No cranial nerve deficit. She exhibits normal muscle tone. Coordination normal.   Skin: Skin is warm, dry and intact. She is not diaphoretic. There is erythema.        A darkened area on the skin graft represents an area of erythema and shotty tender cervical adenopathy   Psychiatric: She has a normal mood and affect. Her behavior is normal. Judgment and thought content normal.       Assessment:       1. Sinusitis, unspecified chronicity, unspecified location    2. Cellulitis, unspecified cellulitis site    3. Otitis externa, unspecified chronicity, unspecified laterality, unspecified type    4. Acute exudative otitis media, unspecified laterality        Plan:      Further med card from 7/13/2017  sulfamethoxazole-trimethoprim 800-160mg (BACTRIM DS) 800-160 mg Tab 1 tablet, 2 times daily         pseudoephedrine (SUDAFED) 30 MG tablet 30 mg, 2 times daily PRN        neomycin-polymyxin-hydrocortisone (CORTISPORIN) 3.5-10,000-1 mg/mL-unit/mL-% otic suspension           Ambulatory referral to ENT   Will f/u if not better after completing med.

## 2017-08-30 DIAGNOSIS — Z78.0 POSTMENOPAUSAL: Primary | ICD-10-CM

## 2017-08-30 DIAGNOSIS — E03.4 HYPOTHYROIDISM DUE TO ACQUIRED ATROPHY OF THYROID: ICD-10-CM

## 2017-08-30 RX ORDER — LEVOTHYROXINE SODIUM 100 UG/1
100 TABLET ORAL EVERY MORNING
Qty: 90 TABLET | Refills: 1 | Status: SHIPPED | OUTPATIENT
Start: 2017-08-30 | End: 2018-01-26 | Stop reason: SDUPTHER

## 2017-08-30 NOTE — LETTER
September 1, 2017    Lucita Saravia  [address] [place] [address]             [facility]  [address] [facility], [address]  [facility] [address]  Phone: [phone]  Fax: [phone] Dear MS Saravia:    This is just to remind you that you still need to schedule your fasting lab appointment. You may contact the appointment desk @ [phone] to do so. Please schedule your lab appointment at least 3 days prior to your physical appointment. You will need to fast 10 hours prior to your lab appointment.     If you have any questions or concerns, please don't hesitate to call.    Sincerely,        Jessica Rosenberg LPN

## 2017-08-30 NOTE — TELEPHONE ENCOUNTER
----- Message from Mai Kauffman sent at 8/30/2017 11:48 AM CDT -----  Contact: Self/[phone]  RX request - refill or new RX.  Is this a refill or new RX:  refill  RX name and strength: SYNTHROID 100 mcg tablet  Directions: TAKE 1 TABLET EVERY MORNING  Is this a 30 day or 90 day RX:    Pharmacy name and phone #:[facility]  [phone] (Phone)  [phone] (Fax)  Comments:  Pt is requesting a call when the refill is sent in

## 2017-08-30 NOTE — TELEPHONE ENCOUNTER
----- Message from Paulo Carey sent at 8/30/2017 11:19 AM CDT -----  Contact: Self   Type: Orders Request    What orders/ testing are being requested? Bone Density test    Is there a future appointment scheduled for the patient with PCP? Yes     When?10/27/2017    Comments:Please advice    Thanks

## 2017-08-31 NOTE — TELEPHONE ENCOUNTER
Message left on voicemail informing patient that refill request has been approved and sent to pharmacy as requested.

## 2017-09-01 NOTE — TELEPHONE ENCOUNTER
Patient has Dexa scan scheduled. Letter sent out to patient reminder her to schedule fasting lab appointment.

## 2017-09-06 ENCOUNTER — TELEPHONE (OUTPATIENT)
Dept: FAMILY MEDICINE | Facility: CLINIC | Age: 67
End: 2017-09-06

## 2017-09-06 NOTE — TELEPHONE ENCOUNTER
Spoke with patient and advised her that the rx was sent to  and received by Fabi on 8/30/17 at 5:08pm.  States she called them back and spoke with someone else and they have it and have mailed it out.

## 2017-09-06 NOTE — TELEPHONE ENCOUNTER
----- Message from Ariela Mack sent at 9/6/2017 10:24 AM CDT -----  Contact: call pt [phone]  Pt is calling to check on her RX for SYNTHROID 100 mcg tablet, [facility] mail order is telling her that they have not received a new script , pt is almost out of the medication

## 2017-09-15 DIAGNOSIS — Z12.31 OTHER SCREENING MAMMOGRAM: ICD-10-CM

## 2017-09-25 ENCOUNTER — OFFICE VISIT (OUTPATIENT)
Dept: INTERNAL MEDICINE | Facility: CLINIC | Age: 67
End: 2017-09-25
Payer: MEDICARE

## 2017-09-25 VITALS
BODY MASS INDEX: 29.92 KG/M2 | TEMPERATURE: 98 F | WEIGHT: 175.25 LBS | HEART RATE: 84 BPM | HEIGHT: 64 IN | RESPIRATION RATE: 16 BRPM | DIASTOLIC BLOOD PRESSURE: 83 MMHG | SYSTOLIC BLOOD PRESSURE: 131 MMHG

## 2017-09-25 DIAGNOSIS — J01.00 ACUTE NON-RECURRENT MAXILLARY SINUSITIS: Primary | ICD-10-CM

## 2017-09-25 PROCEDURE — 3008F BODY MASS INDEX DOCD: CPT | Mod: S$GLB,,, | Performed by: INTERNAL MEDICINE

## 2017-09-25 PROCEDURE — 1159F MED LIST DOCD IN RCRD: CPT | Mod: S$GLB,,, | Performed by: INTERNAL MEDICINE

## 2017-09-25 PROCEDURE — 99999 PR PBB SHADOW E&M-EST. PATIENT-LVL III: CPT | Mod: PBBFAC,,, | Performed by: INTERNAL MEDICINE

## 2017-09-25 PROCEDURE — 99213 OFFICE O/P EST LOW 20 MIN: CPT | Mod: S$GLB,,, | Performed by: INTERNAL MEDICINE

## 2017-09-25 PROCEDURE — 1125F AMNT PAIN NOTED PAIN PRSNT: CPT | Mod: S$GLB,,, | Performed by: INTERNAL MEDICINE

## 2017-09-25 RX ORDER — AMOXICILLIN 875 MG/1
875 TABLET, FILM COATED ORAL 2 TIMES DAILY
Qty: 14 TABLET | Refills: 0 | Status: SHIPPED | OUTPATIENT
Start: 2017-09-25 | End: 2017-10-02

## 2017-09-25 RX ORDER — METHYLPREDNISOLONE 4 MG/1
TABLET ORAL
Qty: 1 PACKAGE | Refills: 0 | Status: SHIPPED | OUTPATIENT
Start: 2017-09-25 | End: 2018-01-31

## 2017-09-25 NOTE — PROGRESS NOTES
Subjective:       Patient ID: Lucita Saravia is a 67 y.o. female.    Chief Complaint: Nasal Congestion and Sore Throat    HPI   Pt here c/o a few days of worsening sinus congestion, ear pressure, sore throat. Minimal relief with Sudafed and antihistamines.   Review of Systems   Constitutional: Negative for activity change, appetite change, chills, diaphoresis, fatigue, fever and unexpected weight change.   HENT: Positive for congestion, postnasal drip, sinus pain, sinus pressure and sore throat. Negative for rhinorrhea, sneezing, trouble swallowing and voice change.    Respiratory: Negative for cough, shortness of breath and wheezing.    Cardiovascular: Negative for chest pain, palpitations and leg swelling.   Gastrointestinal: Negative for abdominal pain, blood in stool, constipation, diarrhea, nausea and vomiting.   Genitourinary: Negative for dysuria.   Musculoskeletal: Negative for arthralgias and myalgias.   Skin: Negative for rash and wound.   Allergic/Immunologic: Negative for environmental allergies and food allergies.   Hematological: Negative for adenopathy. Does not bruise/bleed easily.       Objective:      Physical Exam   Constitutional: She is oriented to person, place, and time. She appears well-developed and well-nourished. No distress.   HENT:   Head: Normocephalic and atraumatic.   Right Ear: External ear normal.   Left Ear: External ear normal.   Nose: Mucosal edema and rhinorrhea present. Right sinus exhibits maxillary sinus tenderness. Left sinus exhibits maxillary sinus tenderness.   Mouth/Throat: Oropharynx is clear and moist. No oropharyngeal exudate.   Eyes: Conjunctivae and EOM are normal. Pupils are equal, round, and reactive to light. Right eye exhibits no discharge. Left eye exhibits no discharge. No scleral icterus.   Neck: Neck supple. No JVD present.   Cardiovascular: Normal rate, regular rhythm, normal heart sounds and intact distal pulses.    Pulmonary/Chest: Effort normal and  breath sounds normal. No respiratory distress. She has no wheezes. She has no rales.   Abdominal: Soft. Bowel sounds are normal. There is no tenderness.   Musculoskeletal: She exhibits no edema.   Lymphadenopathy:     She has no cervical adenopathy.   Neurological: She is alert and oriented to person, place, and time.   Skin: Skin is warm and dry. No rash noted. She is not diaphoretic. No pallor.       Assessment:       1. Acute non-recurrent maxillary sinusitis        Plan:    1. Rx Medrol Pack, Amoxicillin, restart Nasacort

## 2018-01-26 DIAGNOSIS — E03.4 HYPOTHYROIDISM DUE TO ACQUIRED ATROPHY OF THYROID: ICD-10-CM

## 2018-01-26 RX ORDER — LEVOTHYROXINE SODIUM 100 UG/1
TABLET ORAL
Qty: 90 TABLET | Refills: 0 | Status: SHIPPED | OUTPATIENT
Start: 2018-01-26 | End: 2018-04-02 | Stop reason: SDUPTHER

## 2018-01-26 NOTE — LETTER
February 21, 2018    Lucita MAXIMUS Manjit  [address] [place] [address]  [place] [address]             [facility]  [address]  [place] [address]  Phone: [phone] Dear Mrs. Saravia:      Our records indicate that you are overdue for your Annual Physical with Dr. Camargo, along with fasting lab work prior. Please call our office to schedule. If already scheduled, please disregard.       If you have any questions or concerns, please don't hesitate to call.    Sincerely,        Velvet Morejon MA

## 2018-01-31 ENCOUNTER — OFFICE VISIT (OUTPATIENT)
Dept: FAMILY MEDICINE | Facility: CLINIC | Age: 68
End: 2018-01-31
Payer: MEDICARE

## 2018-01-31 VITALS
DIASTOLIC BLOOD PRESSURE: 70 MMHG | WEIGHT: 173.75 LBS | HEIGHT: 66 IN | SYSTOLIC BLOOD PRESSURE: 116 MMHG | BODY MASS INDEX: 27.92 KG/M2 | TEMPERATURE: 99 F | HEART RATE: 82 BPM

## 2018-01-31 DIAGNOSIS — J32.0 CHRONIC MAXILLARY SINUSITIS: Primary | ICD-10-CM

## 2018-01-31 PROCEDURE — 99999 PR PBB SHADOW E&M-EST. PATIENT-LVL III: CPT | Mod: PBBFAC,,, | Performed by: FAMILY MEDICINE

## 2018-01-31 PROCEDURE — 1125F AMNT PAIN NOTED PAIN PRSNT: CPT | Mod: S$GLB,,, | Performed by: FAMILY MEDICINE

## 2018-01-31 PROCEDURE — 99213 OFFICE O/P EST LOW 20 MIN: CPT | Mod: S$GLB,,, | Performed by: FAMILY MEDICINE

## 2018-01-31 PROCEDURE — 1159F MED LIST DOCD IN RCRD: CPT | Mod: S$GLB,,, | Performed by: FAMILY MEDICINE

## 2018-01-31 PROCEDURE — 3008F BODY MASS INDEX DOCD: CPT | Mod: S$GLB,,, | Performed by: FAMILY MEDICINE

## 2018-01-31 RX ORDER — AMOXICILLIN AND CLAVULANATE POTASSIUM 875; 125 MG/1; MG/1
1 TABLET, FILM COATED ORAL 2 TIMES DAILY
Qty: 20 TABLET | Refills: 0 | Status: SHIPPED | OUTPATIENT
Start: 2018-01-31 | End: 2018-04-20

## 2018-01-31 RX ORDER — TRIAMCINOLONE ACETONIDE 55 UG/1
2 SPRAY, METERED NASAL DAILY
Qty: 16 G | Refills: 3 | Status: SHIPPED | OUTPATIENT
Start: 2018-01-31 | End: 2020-11-23 | Stop reason: ALTCHOICE

## 2018-01-31 NOTE — PROGRESS NOTES
Subjective:     Patient ID: Lucita Saravia is a 68 y.o. female.    Chief Complaint: Facial Swelling (Right Eye); Itchy Eye (Bilateral / Redness); Nasal Congestion; Cough (x1-2 days); Pressure Behind the Eyes; and Blurred Vision    HPI  eyes red and swollen for 2 days, her eyes were sticky and draining 2 days ago.  she feels she has facial and congestion for and PND and nasal congestion chronically for the past year more or less. She has been seen mutltiple times and has been treated with antibiotics 2 times in the past year and a half. She is using nasacort once or twice a week and clarinex almost daily or every other day for weeks, she also is using saline rinses.   She also started coughing in the past week , no chest pain or SOB, no fever.   Review of Systems  per HPI  Objective:      Physical Exam   Constitutional: She appears well-developed and well-nourished.   HENT:   Head: Normocephalic and atraumatic.   Right Ear: External ear normal.    Turbinate edema and congestion, septal wall is dry and cracked, Left TM is clear but marked erythema and swelling of blood vessels traversing tm   Eyes: Conjunctivae and EOM are normal. Pupils are equal, round, and reactive to light.   Neck: Normal range of motion. Neck supple. No thyromegaly present.   Cardiovascular: Normal rate, regular rhythm, normal heart sounds and intact distal pulses.    Pulmonary/Chest: Effort normal and breath sounds normal.   Lymphadenopathy:     She has no cervical adenopathy.   Psychiatric: She has a normal mood and affect. Her behavior is normal. Judgment and thought content normal.   Vitals reviewed.      Assessment:     Lucita was seen today for facial swelling, itchy eye, nasal congestion, cough, pressure behind the eyes and blurred vision.    Diagnoses and all orders for this visit:    Chronic maxillary sinusitis  -     amoxicillin-clavulanate 875-125mg (AUGMENTIN) 875-125 mg per tablet; Take 1 tablet by mouth 2 (two) times daily.  -      triamcinolone (NASACORT) 55 mcg nasal inhaler; 2 sprays by Nasal route once daily.    if not better or symptoms return then she will need ct of sinus and ent referral

## 2018-04-02 DIAGNOSIS — R73.03 PREDIABETES: Primary | ICD-10-CM

## 2018-04-02 DIAGNOSIS — E03.4 HYPOTHYROIDISM DUE TO ACQUIRED ATROPHY OF THYROID: ICD-10-CM

## 2018-04-02 DIAGNOSIS — E66.9 OBESITY (BMI 30-39.9): ICD-10-CM

## 2018-04-03 RX ORDER — LEVOTHYROXINE SODIUM 100 UG/1
TABLET ORAL
Qty: 90 TABLET | Refills: 0 | Status: SHIPPED | OUTPATIENT
Start: 2018-04-03 | End: 2018-06-06 | Stop reason: SDUPTHER

## 2018-04-03 NOTE — TELEPHONE ENCOUNTER
Please call pt andlet her know she is way way overdue for a physical. She needs to have labs done NOW and schedule the visit for the next available. I will not refill her med again with labs and a visit!

## 2018-04-20 ENCOUNTER — OFFICE VISIT (OUTPATIENT)
Dept: FAMILY MEDICINE | Facility: CLINIC | Age: 68
End: 2018-04-20
Payer: MEDICARE

## 2018-04-20 VITALS
HEART RATE: 72 BPM | HEIGHT: 65 IN | BODY MASS INDEX: 27.92 KG/M2 | SYSTOLIC BLOOD PRESSURE: 142 MMHG | DIASTOLIC BLOOD PRESSURE: 68 MMHG | WEIGHT: 167.56 LBS | TEMPERATURE: 98 F

## 2018-04-20 DIAGNOSIS — J40 BRONCHITIS: Primary | ICD-10-CM

## 2018-04-20 PROCEDURE — 99214 OFFICE O/P EST MOD 30 MIN: CPT | Mod: S$GLB,,, | Performed by: NURSE PRACTITIONER

## 2018-04-20 PROCEDURE — 99999 PR PBB SHADOW E&M-EST. PATIENT-LVL IV: CPT | Mod: PBBFAC,,, | Performed by: NURSE PRACTITIONER

## 2018-04-20 RX ORDER — MONTELUKAST SODIUM 10 MG/1
10 TABLET ORAL DAILY
Qty: 30 TABLET | Refills: 12 | Status: SHIPPED | OUTPATIENT
Start: 2018-04-20 | End: 2018-05-20

## 2018-04-20 RX ORDER — ALBUTEROL SULFATE 90 UG/1
2 AEROSOL, METERED RESPIRATORY (INHALATION) EVERY 4 HOURS PRN
Qty: 1 INHALER | Refills: 11 | Status: SHIPPED | OUTPATIENT
Start: 2018-04-20 | End: 2019-06-20

## 2018-04-20 NOTE — PATIENT INSTRUCTIONS
What Is Acute Bronchitis?  Acute bronchitis is when the airways in your lungs (bronchial tubes) become red and swollen (inflamed). It is usually caused by a viral infection. But it can also occur because of a bacteria or allergen. Symptoms include a cough that produces yellow or greenish mucus and can last for days or sometimes weeks.  Inside healthy lungs    Air travels in and out of the lungs through the airways. The linings of these airways produce sticky mucus. This mucus traps particles that enter the lungs. Tiny structures called cilia then sweep the particles out of the airways.     Healthy airway: Airways are normally open. Air moves in and out easily.      Healthy cilia: Tiny, hairlike cilia sweep mucus and particles up and out of the airways.   Lungs with bronchitis  Bronchitis often occurs with a cold or the flu virus. The airways become inflamed (red and swollen). There is a deep hacking cough from the extra mucus. Other symptoms may include:  · Wheezing  · Chest discomfort  · Shortness of breath  · Mild fever  A second infection, this time due to bacteria, may then occur. And airways irritated by allergens or smoke are more likely to get infected.        Inflamed airway: Inflammation and extra mucus narrow the airway, causing shortness of breath.      Impaired cilia: Extra mucus impairs cilia, causing congestion and wheezing. Smoking makes the problem worse.   Making a diagnosis  A physical exam, health history, and certain tests help your healthcare provider make the diagnosis.  Health history  Your healthcare provider will ask you about your symptoms.  The exam  Your provider listens to your chest for signs of congestion. He or she may also check your ears, nose, and throat.  Possible tests  · A sputum test for bacteria. This requires a sample of mucus from your lungs.  · A nasal or throat swab. This tests to see if you have a bacterial infection.  · A chest X-ray. This is done if your healthcare  provider thinks you have pneumonia.  · Tests to check for an underlying condition. Other tests may be done to check for things such as allergies, asthma, or COPD (chronic obstructive pulmonary disease). You may need to see a specialist for more lung function testing.  Treating a cough  The main treatment for bronchitis is easing symptoms. Avoiding smoke, allergens, and other things that trigger coughing can often help. If the infection is bacterial, you may be given antibiotics. During the illness, it's important to get plenty of sleep. To ease symptoms:  · Dont smoke. Also avoid secondhand smoke.  · Use a humidifier. Or try breathing in steam from a hot shower. This may help loosen mucus.  · Drink a lot of water and juice. They can soothe the throat and may help thin mucus.  · Sit up or use extra pillows when in bed. This helps to lessen coughing and congestion.  · Ask your provider about using medicine. Ask about using cough medicine, pain and fever medicine, or a decongestant.  Antibiotics  Most cases of bronchitis are caused by cold or flu viruses. They dont need antibiotics to treat them, even if your mucus is thick and green or yellow. Antibiotics dont treat viral illness and antibiotics have not been shown to have any benefit in cases of acute bronchitis. Taking antibiotics when they are not needed increases your risk of getting an infection later that is antibiotic-resistant. Antibiotics can also cause severe cases of diarrhea that require other antibiotics to treat.  It is important that you accept your healthcare provider's opinion to not use antibiotics. Your provider will prescribe antibiotics if the infection is caused by bacteria. If they are prescribed:  · Take all of the medicine. Take the medicine until it is used up, even if symptoms have improved. If you dont, the bronchitis may come back.  · Take the medicines as directed. For instance, some medicines should be taken with food.  · Ask about  side effects. Ask your provider or pharmacist what side effects are common, and what to do about them.  Follow-up care  You should see your provider again in 2 to 3 weeks. By this time, symptoms should have improved. An infection that lasts longer may mean you have a more serious problem.  Prevention  · Avoid tobacco smoke. If you smoke, quit. Stay away from smoky places. Ask friends and family not to smoke around you, or in your home or car.  · Get checked for allergies.  · Ask your provider about getting a yearly flu shot. Also ask about pneumococcal or pneumonia shots.  · Wash your hands often. This helps reduce the chance of picking up viruses that cause colds and flu.  Call your healthcare provider if:  · Symptoms worsen, or you have new symptoms  · Breathing problems worsen or  become severe  · Symptoms dont get better within a week, or within 3 days of taking antibiotics   Date Last Reviewed: 2/1/2017  © 9215-3177 [employer]. [address] [place] [address], [place] [address]. All rights reserved. This information is not intended as a substitute for professional medical care. Always follow your healthcare professional's instructions.

## 2018-04-20 NOTE — PROGRESS NOTES
"Subjective:       Patient ID: Lucita Saravia is a 68 y.o. female.    Chief Complaint: Cough (Productive); Nasal Congestion; and Chest Congestion    Pt here with chest congestion.  Sx Started last week.  Follows with ENT outside of [facility].   Denies fever/ chills.  Has cough and wheezing        Cough   Associated symptoms include postnasal drip, rhinorrhea and wheezing. Pertinent negatives include no chest pain, chills, myalgias, rash or sore throat.     Past Medical History:   Diagnosis Date    Female infertility NEC     History of degenerative disc disease     neck    Hypothyroidism      Past Surgical History:   Procedure Laterality Date    APPENDECTOMY      EYE SURGERY      LASIK    LASIK      PELVIC LAPAROSCOPY      fertillity    TONSILLECTOMY, ADENOIDECTOMY      TOTAL ABDOMINAL HYSTERECTOMY      endometrisosis     Social History     Social History Narrative    Single, lives alone, retired -but works PT serving at [employer]     Family History   Problem Relation Age of Onset    Alzheimer's disease Mother     Stroke Mother     COPD Father     Cancer Sister      breast cancer    Alzheimer's disease Sister     Kidney nephrosis Daughter     Cancer Paternal Grandmother      rectal cancer     Vitals:    04/20/18 1248   BP: (!) 142/68   Pulse: 72   Temp: 97.7 °F (36.5 °C)   TempSrc: Oral   Weight: 76 kg (167 lb 8.8 oz)   Height: 5' 5" (1.651 m)   PainSc: 0-No pain     Outpatient Encounter Prescriptions as of 4/20/2018   Medication Sig Dispense Refill    b complex vitamins capsule Take 1 capsule by mouth once daily.      CYANOCOBALAMIN, VITAMIN B-12, (VITAMIN B-12 SL) Place under the tongue.      ERGOCALCIFEROL, VITAMIN D2, (VITAMIN D ORAL) Take 400 mg by mouth.      fish oil-omega-3 fatty acids 300-1,000 mg capsule Take 2 g by mouth once daily.      MAGNESIUM ORAL Take 1 capsule by mouth once daily.      multivitamin capsule Take 1 capsule by mouth once daily.      SYNTHROID 100 mcg tablet TAKE 1 " "TABLET EVERY MORNING 90 tablet 0    triamcinolone (NASACORT) 55 mcg nasal inhaler 2 sprays by Nasal route once daily. (Patient taking differently: 2 sprays by Nasal route daily as needed. ) 16 g 3    VIT C/VIT E/LUTEIN/MIN/OMEGA-3 (OCUVITE ORAL) Take by mouth.      albuterol 90 mcg/actuation inhaler Inhale 2 puffs into the lungs every 4 (four) hours as needed for Wheezing. Use with spacer 1 Inhaler 11    inhalation spacing device Use with inhaler dispense with mouthpiece 1 Device 1    montelukast (SINGULAIR) 10 mg tablet Take 1 tablet (10 mg total) by mouth once daily. 30 tablet 12    [DISCONTINUED] amoxicillin-clavulanate 875-125mg (AUGMENTIN) 875-125 mg per tablet Take 1 tablet by mouth 2 (two) times daily. 20 tablet 0    [DISCONTINUED] desloratadine (CLARINEX) 5 mg tablet Take 1 tablet (5 mg total) by mouth once daily. 30 tablet 5     No facility-administered encounter medications on file as of 4/20/2018.      Wt Readings from Last 3 Encounters:   04/20/18 76 kg (167 lb 8.8 oz)   01/31/18 78.8 kg (173 lb 11.6 oz)   09/25/17 79.5 kg (175 lb 4.3 oz)     Last 3 sets of Vitals    Vitals - 1 value per visit 9/25/2017 1/31/2018 4/20/2018   SYSTOLIC 131 116 142   DIASTOLIC 83 70 68   PULSE 84 82 72   TEMPERATURE 98.1 98.7 97.7   RESPIRATIONS 16 - -   SPO2 - - -   Weight (lb) 175.27 173.72 167.55   Weight (kg) 79.5 78.8 76   HEIGHT 5' 4" 5' 5.5" 5' 5"   BODY MASS INDEX 30.08 28.47 27.88   VISIT REPORT - - -   Pain Score  5 6 0     No results found for: CBC  Review of Systems   Constitutional: Negative for chills, diaphoresis and fatigue.   HENT: Positive for postnasal drip and rhinorrhea. Negative for congestion, sinus pressure, sneezing, sore throat, tinnitus, trouble swallowing and voice change.    Respiratory: Positive for cough, chest tightness and wheezing.    Cardiovascular: Negative for chest pain and palpitations.   Gastrointestinal: Negative for abdominal pain, diarrhea, nausea and vomiting. "   Musculoskeletal: Negative for arthralgias, myalgias, neck pain and neck stiffness.   Skin: Negative for rash.   Psychiatric/Behavioral: Negative for sleep disturbance.       Objective:      Physical Exam   Constitutional: She is oriented to person, place, and time. She appears well-developed and well-nourished. No distress.   HENT:   Head: Normocephalic and atraumatic.   Right Ear: External ear normal.   Left Ear: External ear normal.   Nose: Rhinorrhea present.   Mouth/Throat: Uvula is midline and oropharynx is clear and moist. No oropharyngeal exudate.   Clear rhinorrhea     Eyes: Conjunctivae are normal. Pupils are equal, round, and reactive to light. Right eye exhibits no discharge. Left eye exhibits no discharge.   Neck: Normal range of motion. Neck supple.   Cardiovascular: Normal rate, regular rhythm, normal heart sounds and intact distal pulses.    Pulmonary/Chest: Effort normal. No stridor. She has wheezes.   Lymphadenopathy:     She has no cervical adenopathy.   Neurological: She is alert and oriented to person, place, and time.   Skin: Skin is warm and dry. Capillary refill takes less than 2 seconds. No rash noted. She is not diaphoretic. No erythema. No pallor.   Psychiatric: She has a normal mood and affect. Her behavior is normal. Judgment and thought content normal.   Nursing note and vitals reviewed.      Assessment:       1. Bronchitis        Plan:       Lucita was seen today for cough, nasal congestion and chest congestion.    Diagnoses and all orders for this visit:    Bronchitis  -     albuterol 90 mcg/actuation inhaler; Inhale 2 puffs into the lungs every 4 (four) hours as needed for Wheezing. Use with spacer  -     inhalation spacing device; Use with inhaler dispense with mouthpiece  -     montelukast (SINGULAIR) 10 mg tablet; Take 1 tablet (10 mg total) by mouth once daily.      Patient Instructions       What Is Acute Bronchitis?  Acute bronchitis is when the airways in your lungs  (bronchial tubes) become red and swollen (inflamed). It is usually caused by a viral infection. But it can also occur because of a bacteria or allergen. Symptoms include a cough that produces yellow or greenish mucus and can last for days or sometimes weeks.  Inside healthy lungs    Air travels in and out of the lungs through the airways. The linings of these airways produce sticky mucus. This mucus traps particles that enter the lungs. Tiny structures called cilia then sweep the particles out of the airways.     Healthy airway: Airways are normally open. Air moves in and out easily.      Healthy cilia: Tiny, hairlike cilia sweep mucus and particles up and out of the airways.   Lungs with bronchitis  Bronchitis often occurs with a cold or the flu virus. The airways become inflamed (red and swollen). There is a deep hacking cough from the extra mucus. Other symptoms may include:  · Wheezing  · Chest discomfort  · Shortness of breath  · Mild fever  A second infection, this time due to bacteria, may then occur. And airways irritated by allergens or smoke are more likely to get infected.        Inflamed airway: Inflammation and extra mucus narrow the airway, causing shortness of breath.      Impaired cilia: Extra mucus impairs cilia, causing congestion and wheezing. Smoking makes the problem worse.   Making a diagnosis  A physical exam, health history, and certain tests help your healthcare provider make the diagnosis.  Health history  Your healthcare provider will ask you about your symptoms.  The exam  Your provider listens to your chest for signs of congestion. He or she may also check your ears, nose, and throat.  Possible tests  · A sputum test for bacteria. This requires a sample of mucus from your lungs.  · A nasal or throat swab. This tests to see if you have a bacterial infection.  · A chest X-ray. This is done if your healthcare provider thinks you have pneumonia.  · Tests to check for an underlying condition.  Other tests may be done to check for things such as allergies, asthma, or COPD (chronic obstructive pulmonary disease). You may need to see a specialist for more lung function testing.  Treating a cough  The main treatment for bronchitis is easing symptoms. Avoiding smoke, allergens, and other things that trigger coughing can often help. If the infection is bacterial, you may be given antibiotics. During the illness, it's important to get plenty of sleep. To ease symptoms:  · Dont smoke. Also avoid secondhand smoke.  · Use a humidifier. Or try breathing in steam from a hot shower. This may help loosen mucus.  · Drink a lot of water and juice. They can soothe the throat and may help thin mucus.  · Sit up or use extra pillows when in bed. This helps to lessen coughing and congestion.  · Ask your provider about using medicine. Ask about using cough medicine, pain and fever medicine, or a decongestant.  Antibiotics  Most cases of bronchitis are caused by cold or flu viruses. They dont need antibiotics to treat them, even if your mucus is thick and green or yellow. Antibiotics dont treat viral illness and antibiotics have not been shown to have any benefit in cases of acute bronchitis. Taking antibiotics when they are not needed increases your risk of getting an infection later that is antibiotic-resistant. Antibiotics can also cause severe cases of diarrhea that require other antibiotics to treat.  It is important that you accept your healthcare provider's opinion to not use antibiotics. Your provider will prescribe antibiotics if the infection is caused by bacteria. If they are prescribed:  · Take all of the medicine. Take the medicine until it is used up, even if symptoms have improved. If you dont, the bronchitis may come back.  · Take the medicines as directed. For instance, some medicines should be taken with food.  · Ask about side effects. Ask your provider or pharmacist what side effects are common, and what  to do about them.  Follow-up care  You should see your provider again in 2 to 3 weeks. By this time, symptoms should have improved. An infection that lasts longer may mean you have a more serious problem.  Prevention  · Avoid tobacco smoke. If you smoke, quit. Stay away from smoky places. Ask friends and family not to smoke around you, or in your home or car.  · Get checked for allergies.  · Ask your provider about getting a yearly flu shot. Also ask about pneumococcal or pneumonia shots.  · Wash your hands often. This helps reduce the chance of picking up viruses that cause colds and flu.  Call your healthcare provider if:  · Symptoms worsen, or you have new symptoms  · Breathing problems worsen or  become severe  · Symptoms dont get better within a week, or within 3 days of taking antibiotics   Date Last Reviewed: 2/1/2017  © 1966-3777 [employer]. [address] [place] [address], [place] [address]. All rights reserved. This information is not intended as a substitute for professional medical care. Always follow your healthcare professional's instructions.

## 2018-05-04 ENCOUNTER — TELEPHONE (OUTPATIENT)
Dept: FAMILY MEDICINE | Facility: CLINIC | Age: 68
End: 2018-05-04

## 2018-05-04 NOTE — TELEPHONE ENCOUNTER
----- Message from Harika Jacobo sent at 5/4/2018  3:06 PM CDT -----  Contact: Pt [phone]  Doctor appointment and lab have been scheduled.  Please link lab orders to the lab appointment.  Date of doctor appointment:  05/25/2018  Physical or EP:  Ep   Date of lab appointment:  05/22/2018  Comments:

## 2018-05-22 ENCOUNTER — PES CALL (OUTPATIENT)
Dept: ADMINISTRATIVE | Facility: CLINIC | Age: 68
End: 2018-05-22

## 2018-06-05 ENCOUNTER — LAB VISIT (OUTPATIENT)
Dept: LAB | Facility: HOSPITAL | Age: 68
End: 2018-06-05
Attending: FAMILY MEDICINE
Payer: MEDICARE

## 2018-06-05 DIAGNOSIS — R73.03 PREDIABETES: ICD-10-CM

## 2018-06-05 DIAGNOSIS — E66.9 OBESITY (BMI 30-39.9): ICD-10-CM

## 2018-06-05 DIAGNOSIS — E03.4 HYPOTHYROIDISM DUE TO ACQUIRED ATROPHY OF THYROID: ICD-10-CM

## 2018-06-05 LAB
ALBUMIN SERPL BCP-MCNC: 4 G/DL
ALP SERPL-CCNC: 84 U/L
ALT SERPL W/O P-5'-P-CCNC: 15 U/L
ANION GAP SERPL CALC-SCNC: 7 MMOL/L
AST SERPL-CCNC: 20 U/L
BASOPHILS # BLD AUTO: 0.05 K/UL
BASOPHILS NFR BLD: 0.6 %
BILIRUB SERPL-MCNC: 0.4 MG/DL
BUN SERPL-MCNC: 14 MG/DL
CALCIUM SERPL-MCNC: 10.3 MG/DL
CHLORIDE SERPL-SCNC: 107 MMOL/L
CHOLEST SERPL-MCNC: 217 MG/DL
CHOLEST/HDLC SERPL: 3.9 {RATIO}
CO2 SERPL-SCNC: 27 MMOL/L
CREAT SERPL-MCNC: 0.8 MG/DL
DIFFERENTIAL METHOD: ABNORMAL
EOSINOPHIL # BLD AUTO: 0.2 K/UL
EOSINOPHIL NFR BLD: 1.8 %
ERYTHROCYTE [DISTWIDTH] IN BLOOD BY AUTOMATED COUNT: 13.9 %
EST. GFR  (AFRICAN AMERICAN): >60 ML/MIN/1.73 M^2
EST. GFR  (NON AFRICAN AMERICAN): >60 ML/MIN/1.73 M^2
ESTIMATED AVG GLUCOSE: 111 MG/DL
GLUCOSE SERPL-MCNC: 88 MG/DL
HBA1C MFR BLD HPLC: 5.5 %
HCT VFR BLD AUTO: 45.7 %
HDLC SERPL-MCNC: 55 MG/DL
HDLC SERPL: 25.3 %
HGB BLD-MCNC: 14.5 G/DL
IMM GRANULOCYTES # BLD AUTO: 0.02 K/UL
IMM GRANULOCYTES NFR BLD AUTO: 0.2 %
LDLC SERPL CALC-MCNC: 128.6 MG/DL
LYMPHOCYTES # BLD AUTO: 2.5 K/UL
LYMPHOCYTES NFR BLD: 30.2 %
MCH RBC QN AUTO: 28.3 PG
MCHC RBC AUTO-ENTMCNC: 31.7 G/DL
MCV RBC AUTO: 89 FL
MONOCYTES # BLD AUTO: 0.5 K/UL
MONOCYTES NFR BLD: 6.1 %
NEUTROPHILS # BLD AUTO: 5.1 K/UL
NEUTROPHILS NFR BLD: 61.1 %
NONHDLC SERPL-MCNC: 162 MG/DL
NRBC BLD-RTO: 0 /100 WBC
PLATELET # BLD AUTO: 272 K/UL
PMV BLD AUTO: 10 FL
POTASSIUM SERPL-SCNC: 4.4 MMOL/L
PROT SERPL-MCNC: 7 G/DL
RBC # BLD AUTO: 5.12 M/UL
SODIUM SERPL-SCNC: 141 MMOL/L
TRIGL SERPL-MCNC: 167 MG/DL
TSH SERPL DL<=0.005 MIU/L-ACNC: 1.62 UIU/ML
WBC # BLD AUTO: 8.32 K/UL

## 2018-06-05 PROCEDURE — 83036 HEMOGLOBIN GLYCOSYLATED A1C: CPT

## 2018-06-05 PROCEDURE — 36415 COLL VENOUS BLD VENIPUNCTURE: CPT | Mod: PO

## 2018-06-05 PROCEDURE — 80053 COMPREHEN METABOLIC PANEL: CPT

## 2018-06-05 PROCEDURE — 85025 COMPLETE CBC W/AUTO DIFF WBC: CPT

## 2018-06-05 PROCEDURE — 80061 LIPID PANEL: CPT

## 2018-06-05 PROCEDURE — 84443 ASSAY THYROID STIM HORMONE: CPT

## 2018-06-06 DIAGNOSIS — E03.4 HYPOTHYROIDISM DUE TO ACQUIRED ATROPHY OF THYROID: ICD-10-CM

## 2018-06-06 RX ORDER — LEVOTHYROXINE SODIUM 100 UG/1
TABLET ORAL
Qty: 90 TABLET | Refills: 0 | Status: SHIPPED | OUTPATIENT
Start: 2018-06-06 | End: 2018-06-19 | Stop reason: SDUPTHER

## 2018-06-19 ENCOUNTER — OFFICE VISIT (OUTPATIENT)
Dept: INTERNAL MEDICINE | Facility: CLINIC | Age: 68
End: 2018-06-19
Payer: MEDICARE

## 2018-06-19 VITALS
DIASTOLIC BLOOD PRESSURE: 60 MMHG | SYSTOLIC BLOOD PRESSURE: 124 MMHG | TEMPERATURE: 98 F | WEIGHT: 170.44 LBS | RESPIRATION RATE: 16 BRPM | HEART RATE: 72 BPM | BODY MASS INDEX: 29.1 KG/M2 | HEIGHT: 64 IN

## 2018-06-19 DIAGNOSIS — E03.4 HYPOTHYROIDISM DUE TO ACQUIRED ATROPHY OF THYROID: ICD-10-CM

## 2018-06-19 DIAGNOSIS — Z12.11 COLON CANCER SCREENING: ICD-10-CM

## 2018-06-19 DIAGNOSIS — Z00.00 HEALTHCARE MAINTENANCE: Primary | ICD-10-CM

## 2018-06-19 PROCEDURE — 99214 OFFICE O/P EST MOD 30 MIN: CPT | Mod: S$GLB,,, | Performed by: INTERNAL MEDICINE

## 2018-06-19 PROCEDURE — 99999 PR PBB SHADOW E&M-EST. PATIENT-LVL III: CPT | Mod: PBBFAC,,, | Performed by: INTERNAL MEDICINE

## 2018-06-19 PROCEDURE — 99499 UNLISTED E&M SERVICE: CPT | Mod: S$GLB,,, | Performed by: INTERNAL MEDICINE

## 2018-06-19 RX ORDER — LEVOTHYROXINE SODIUM 100 UG/1
100 TABLET ORAL EVERY MORNING
Qty: 90 TABLET | Refills: 2 | Status: SHIPPED | OUTPATIENT
Start: 2018-06-19 | End: 2019-03-07 | Stop reason: SDUPTHER

## 2018-06-19 NOTE — PROGRESS NOTES
Subjective:       Patient ID: uLcita Saravia is a 68 y.o. female.    Chief Complaint: Annual Exam    HPI    68 y.o. female here to discuss healthcare maintenance and f/u of chronic medical conditions.      Health Maintenance:   Lipid disorders/ASCVD risk: utd  ASCVD 6.7%  DM: A1c 5.5  Hepatitis C (5831-8043): completed   Eye exam: due  Breast Cancer (40-74y): Mammogram declines this year; will do next year   Cervical Cancer (21-65y):  S/p hysterectomy  Colorectal Cancer (50-75yr): Colonoscopy 2011 w/ rec to repeat in 5 years: due now    DEXA (F>64 yo, M >71yo): will complete next year       Vaccines:   Influenza (yearly)    Tetanus (every 10 yrs - 1st tdap) defers    PPSV23(>64yo or <65 w/ lung dz, smoking, DM) declines right now   PCV13 (> 65 or <65 w/ immunocompromised) 2016   Zoster (>59yo) declines     Exercise: active lifestyle  Diet: cooks at home     Medical Problems:  1. hypothyroidism: stable on levothyroxine 100mcg every morning      Past Medical History:   Diagnosis Date    Female infertility NEC     History of degenerative disc disease     neck    Hypothyroidism      Past Surgical History:   Procedure Laterality Date    APPENDECTOMY      EYE SURGERY      LASIK    LASIK      PELVIC LAPAROSCOPY      fertillity    TONSILLECTOMY, ADENOIDECTOMY      TOTAL ABDOMINAL HYSTERECTOMY      endometrisosis     Family History   Problem Relation Age of Onset    Alzheimer's disease Mother     Stroke Mother     COPD Father     Cancer Sister         breast cancer    Alzheimer's disease Sister     Kidney nephrosis Daughter     Cancer Paternal Grandmother         rectal cancer     Social History     Social History    Marital status:      Spouse name: N/A    Number of children: N/A    Years of education: N/A     Occupational History    disability for neck pain      Social History Main Topics    Smoking status: Never Smoker    Smokeless tobacco: Never Used    Alcohol use Yes      Comment: rare     Drug use: No    Sexual activity: Not Currently     Other Topics Concern    Not on file     Social History Narrative    Single, lives alone, retired -but works PT serving at [employer]     Review of patient's allergies indicates:  No Known Allergies    Current Outpatient Prescriptions:     b complex vitamins capsule, Take 1 capsule by mouth once daily., Disp: , Rfl:     CYANOCOBALAMIN, VITAMIN B-12, (VITAMIN B-12 SL), Place under the tongue., Disp: , Rfl:     ERGOCALCIFEROL, VITAMIN D2, (VITAMIN D ORAL), Take 400 mg by mouth., Disp: , Rfl:     fish oil-omega-3 fatty acids 300-1,000 mg capsule, Take 2 g by mouth once daily., Disp: , Rfl:     MAGNESIUM ORAL, Take 1 capsule by mouth once daily., Disp: , Rfl:     multivitamin capsule, Take 1 capsule by mouth once daily., Disp: , Rfl:     SYNTHROID 100 mcg tablet, Take 1 tablet (100 mcg total) by mouth every morning., Disp: 90 tablet, Rfl: 2    VIT C/VIT E/LUTEIN/MIN/OMEGA-3 (OCUVITE ORAL), Take by mouth., Disp: , Rfl:     albuterol 90 mcg/actuation inhaler, Inhale 2 puffs into the lungs every 4 (four) hours as needed for Wheezing. Use with spacer, Disp: 1 Inhaler, Rfl: 11    inhalation spacing device, Use with inhaler dispense with mouthpiece, Disp: 1 Device, Rfl: 1    triamcinolone (NASACORT) 55 mcg nasal inhaler, 2 sprays by Nasal route once daily. (Patient taking differently: 2 sprays by Nasal route daily as needed. ), Disp: 16 g, Rfl: 3    Review of Systems   Constitutional: Negative for fatigue and unexpected weight change.   HENT: Negative for dental problem, sinus pain and trouble swallowing.    Eyes: Negative for discharge and redness.   Respiratory: Negative for cough and shortness of breath.    Cardiovascular: Negative for chest pain and leg swelling.   Gastrointestinal: Negative for abdominal pain, blood in stool, constipation and diarrhea.   Genitourinary: Negative for difficulty urinating, dysuria and frequency.   Musculoskeletal: Negative  "for gait problem and joint swelling.   Skin: Negative for pallor, rash and wound.   Neurological: Negative for numbness and headaches.       Objective:        Vitals:    06/19/18 1245   BP: 124/60   BP Location: Right arm   Patient Position: Sitting   BP Method: Large (Manual)   Pulse: 72   Resp: 16   Temp: 98.3 °F (36.8 °C)   TempSrc: Oral   Weight: 77.3 kg (170 lb 6.7 oz)   Height: 5' 4" (1.626 m)       Body mass index is 29.25 kg/m².    Physical Exam   Constitutional: She is oriented to person, place, and time. She appears well-developed. No distress.   HENT:   Head: Normocephalic and atraumatic.   Right Ear: Tympanic membrane normal.   Left Ear: Tympanic membrane normal.   Nose: Nose normal.   Mouth/Throat: Oropharynx is clear and moist.   Eyes: Conjunctivae and EOM are normal. Pupils are equal, round, and reactive to light.   Neck: Normal range of motion. Neck supple. No tracheal deviation present. No thyromegaly present.   Cardiovascular: Normal rate, regular rhythm, normal heart sounds and intact distal pulses.    Pulmonary/Chest: Effort normal and breath sounds normal.   Abdominal: Soft. Bowel sounds are normal. She exhibits no distension and no mass. There is no tenderness.   Musculoskeletal: Normal range of motion. She exhibits no edema.   Lymphadenopathy:     She has no cervical adenopathy.   Neurological: She is alert and oriented to person, place, and time. No sensory deficit.   Skin: Skin is warm and dry. She is not diaphoretic. No cyanosis. Nails show no clubbing.   Psychiatric: She has a normal mood and affect. Her behavior is normal. Judgment normal.       Assessment:     1. Healthcare maintenance    2. Hypothyroidism due to acquired atrophy of thyroid    3. Colon cancer screening           Plan:         1. Healthcare maintenance  - labs reviewed  - she declines mammogram, bone density, pneumovax, shingles vaccine at this time    2. Hypothyroidism due to acquired atrophy of thyroid  - SYNTHROID 100 " mcg tablet; Take 1 tablet (100 mcg total) by mouth every morning.  Dispense: 90 tablet; Refill: 2    3. Colon cancer screening  - Case request GI: COLONOSCOPY  - encouraged pt to schedule this as she is already two years overdue

## 2018-07-16 ENCOUNTER — PES CALL (OUTPATIENT)
Dept: ADMINISTRATIVE | Facility: CLINIC | Age: 68
End: 2018-07-16

## 2018-08-14 ENCOUNTER — TELEPHONE (OUTPATIENT)
Dept: INTERNAL MEDICINE | Facility: CLINIC | Age: 68
End: 2018-08-14

## 2018-11-16 DIAGNOSIS — Z12.39 BREAST CANCER SCREENING: ICD-10-CM

## 2019-03-07 DIAGNOSIS — E03.4 HYPOTHYROIDISM DUE TO ACQUIRED ATROPHY OF THYROID: ICD-10-CM

## 2019-03-07 RX ORDER — LEVOTHYROXINE SODIUM 100 UG/1
TABLET ORAL
Qty: 90 TABLET | Refills: 2 | Status: SHIPPED | OUTPATIENT
Start: 2019-03-07 | End: 2019-10-09 | Stop reason: SDUPTHER

## 2019-03-08 ENCOUNTER — TELEPHONE (OUTPATIENT)
Dept: ADMINISTRATIVE | Facility: CLINIC | Age: 69
End: 2019-03-08

## 2019-03-16 ENCOUNTER — OFFICE VISIT (OUTPATIENT)
Dept: INTERNAL MEDICINE | Facility: CLINIC | Age: 69
End: 2019-03-16
Payer: MEDICARE

## 2019-03-16 VITALS
TEMPERATURE: 99 F | RESPIRATION RATE: 15 BRPM | BODY MASS INDEX: 30.69 KG/M2 | WEIGHT: 178.81 LBS | SYSTOLIC BLOOD PRESSURE: 102 MMHG | DIASTOLIC BLOOD PRESSURE: 80 MMHG | OXYGEN SATURATION: 97 % | HEART RATE: 69 BPM

## 2019-03-16 DIAGNOSIS — J30.1 ALLERGIC RHINITIS DUE TO POLLEN, UNSPECIFIED SEASONALITY: Primary | ICD-10-CM

## 2019-03-16 PROCEDURE — 1101F PR PT FALLS ASSESS DOC 0-1 FALLS W/OUT INJ PAST YR: ICD-10-PCS | Mod: HCNC,CPTII,S$GLB, | Performed by: INTERNAL MEDICINE

## 2019-03-16 PROCEDURE — 99213 OFFICE O/P EST LOW 20 MIN: CPT | Mod: HCNC,S$GLB,, | Performed by: INTERNAL MEDICINE

## 2019-03-16 PROCEDURE — 99213 PR OFFICE/OUTPT VISIT, EST, LEVL III, 20-29 MIN: ICD-10-PCS | Mod: HCNC,S$GLB,, | Performed by: INTERNAL MEDICINE

## 2019-03-16 PROCEDURE — 1101F PT FALLS ASSESS-DOCD LE1/YR: CPT | Mod: HCNC,CPTII,S$GLB, | Performed by: INTERNAL MEDICINE

## 2019-03-16 PROCEDURE — 99999 PR PBB SHADOW E&M-EST. PATIENT-LVL III: CPT | Mod: PBBFAC,HCNC,, | Performed by: INTERNAL MEDICINE

## 2019-03-16 PROCEDURE — 99999 PR PBB SHADOW E&M-EST. PATIENT-LVL III: ICD-10-PCS | Mod: PBBFAC,HCNC,, | Performed by: INTERNAL MEDICINE

## 2019-03-16 RX ORDER — AZELASTINE 1 MG/ML
1 SPRAY, METERED NASAL 2 TIMES DAILY
Qty: 30 ML | Refills: 0 | Status: SHIPPED | OUTPATIENT
Start: 2019-03-16 | End: 2019-06-20 | Stop reason: ALTCHOICE

## 2019-03-26 ENCOUNTER — TELEPHONE (OUTPATIENT)
Dept: INTERNAL MEDICINE | Facility: CLINIC | Age: 69
End: 2019-03-26

## 2019-03-28 ENCOUNTER — OFFICE VISIT (OUTPATIENT)
Dept: FAMILY MEDICINE | Facility: CLINIC | Age: 69
End: 2019-03-28
Payer: MEDICARE

## 2019-03-28 ENCOUNTER — HOSPITAL ENCOUNTER (OUTPATIENT)
Dept: RADIOLOGY | Facility: HOSPITAL | Age: 69
Discharge: HOME | End: 2019-03-28
Attending: FAMILY MEDICINE
Payer: MEDICARE

## 2019-03-28 VITALS
BODY MASS INDEX: 29.65 KG/M2 | DIASTOLIC BLOOD PRESSURE: 70 MMHG | RESPIRATION RATE: 20 BRPM | TEMPERATURE: 98 F | HEIGHT: 65 IN | SYSTOLIC BLOOD PRESSURE: 110 MMHG | WEIGHT: 177.94 LBS

## 2019-03-28 DIAGNOSIS — B96.89 BACTERIAL INFECTION OF SKIN: ICD-10-CM

## 2019-03-28 DIAGNOSIS — J32.9 SINUSITIS, UNSPECIFIED CHRONICITY, UNSPECIFIED LOCATION: ICD-10-CM

## 2019-03-28 DIAGNOSIS — L08.9 BACTERIAL INFECTION OF SKIN: ICD-10-CM

## 2019-03-28 DIAGNOSIS — J32.9 SINUSITIS, UNSPECIFIED CHRONICITY, UNSPECIFIED LOCATION: Primary | ICD-10-CM

## 2019-03-28 DIAGNOSIS — R05.9 COUGH: ICD-10-CM

## 2019-03-28 PROCEDURE — 99214 OFFICE O/P EST MOD 30 MIN: CPT | Mod: HCNC,S$GLB,, | Performed by: FAMILY MEDICINE

## 2019-03-28 PROCEDURE — 1101F PT FALLS ASSESS-DOCD LE1/YR: CPT | Mod: HCNC,CPTII,S$GLB, | Performed by: FAMILY MEDICINE

## 2019-03-28 PROCEDURE — 1101F PR PT FALLS ASSESS DOC 0-1 FALLS W/OUT INJ PAST YR: ICD-10-PCS | Mod: HCNC,CPTII,S$GLB, | Performed by: FAMILY MEDICINE

## 2019-03-28 PROCEDURE — 70210 X-RAY EXAM OF SINUSES: CPT | Mod: TC,HCNC,FY,PO

## 2019-03-28 PROCEDURE — 99214 PR OFFICE/OUTPT VISIT, EST, LEVL IV, 30-39 MIN: ICD-10-PCS | Mod: HCNC,S$GLB,, | Performed by: FAMILY MEDICINE

## 2019-03-28 PROCEDURE — 70210 XR SINUSES 1 VIEW WATERS: ICD-10-PCS | Mod: 26,HCNC,, | Performed by: RADIOLOGY

## 2019-03-28 PROCEDURE — 99999 PR PBB SHADOW E&M-EST. PATIENT-LVL III: CPT | Mod: PBBFAC,HCNC,, | Performed by: FAMILY MEDICINE

## 2019-03-28 PROCEDURE — 70210 X-RAY EXAM OF SINUSES: CPT | Mod: 26,HCNC,, | Performed by: RADIOLOGY

## 2019-03-28 PROCEDURE — 99999 PR PBB SHADOW E&M-EST. PATIENT-LVL III: ICD-10-PCS | Mod: PBBFAC,HCNC,, | Performed by: FAMILY MEDICINE

## 2019-03-28 RX ORDER — CEPHALEXIN 500 MG/1
500 CAPSULE ORAL EVERY 8 HOURS
Qty: 30 CAPSULE | Refills: 0 | Status: SHIPPED | OUTPATIENT
Start: 2019-03-28 | End: 2019-06-20 | Stop reason: ALTCHOICE

## 2019-03-28 RX ORDER — BENZONATATE 200 MG/1
200 CAPSULE ORAL 3 TIMES DAILY PRN
Qty: 30 CAPSULE | Refills: 0 | Status: SHIPPED | OUTPATIENT
Start: 2019-03-28 | End: 2019-04-07

## 2019-03-29 NOTE — PROGRESS NOTES
Subjective:       Patient ID: Lucita Saravia is a 69 y.o. female.    Chief Complaint: Cough and Sinus Problem (pollen)   Patient is suffering with a cough sinus congestion for 4 weeks now  Patient has been to 2 different doctors have treated her with steroids cough medicine  Decongestants which do not seem to be helping the problem.  Patient complaining of pain  Of the skin on the outer ears  [place] of Systems   Constitutional: Positive for fatigue.   HENT: Positive for congestion, ear pain, rhinorrhea, sinus pressure and sneezing.    Eyes: Negative.    Respiratory: Positive for cough.    Cardiovascular: Negative.    Gastrointestinal: Negative.    Endocrine: Negative.    Genitourinary: Negative.    Musculoskeletal: Negative.    Skin: Positive for color change.   Allergic/Immunologic: Positive for environmental allergies.   Neurological: Negative.    Hematological: Negative.    Psychiatric/Behavioral: Negative.        Objective:      Physical Exam   Constitutional: She is oriented to person, place, and time. She appears well-developed. She appears distressed.   HENT:   Head: Normocephalic and atraumatic.   Right Ear: Hearing, tympanic membrane and ear canal normal.   Left Ear: Hearing, tympanic membrane and ear canal normal.   Ears:    Nose: Mucosal edema, rhinorrhea and sinus tenderness present. Right sinus exhibits maxillary sinus tenderness and frontal sinus tenderness. Left sinus exhibits maxillary sinus tenderness and frontal sinus tenderness.   Mouth/Throat: Uvula is midline and mucous membranes are normal. Tonsils are 0 on the right. Tonsils are 0 on the left.   Eyes: Pupils are equal, round, and reactive to light. Conjunctivae and EOM are normal.   Neck: Normal range of motion. Neck supple. No JVD present. No thyromegaly present.   Cardiovascular: Normal rate, regular rhythm, normal heart sounds and intact distal pulses.   No murmur heard.  Pulmonary/Chest: Effort normal and breath sounds normal. She has  no wheezes.   Abdominal: Soft. Bowel sounds are normal. She exhibits no distension and no mass. There is no tenderness. There is no guarding.   Neurological: She is alert and oriented to person, place, and time. She displays normal reflexes. No cranial nerve deficit or sensory deficit. She exhibits normal muscle tone. Coordination normal.   Skin: Skin is warm and dry. Capillary refill takes less than 2 seconds. There is erythema.   Psychiatric: She has a normal mood and affect. Her behavior is normal. Judgment and thought content normal.   Nursing note and vitals reviewed.      Assessment:       1. Sinusitis, unspecified chronicity, unspecified location    2. Bacterial infection of skin    3. Cough        Plan:     see med card dated 03/28/2019   fish oil-omega-3 fatty acids 300-1,000 mg capsule 2 g, Daily       Antitussives - Non-Opioid    benzonatate (TESSALON) 200 MG capsule 200 mg, 3 times daily PRN       Asthma/COPD Therapy - Beta 2-Adrenergic Agents, Inhaled, Short Acting    albuterol 90 mcg/actuation inhaler 2 puff, Every 4 hours PRN       B-Complex Vitamins    b complex vitamins capsule 1 capsule, Daily       Cephalosporin Antibiotics - 1st Generation    cephALEXin (KEFLEX) 500 MG capsule 500 mg, Every 8 hours       Medical Supplies and DME - Respiratory Therapy Supplies, Medical Supply, FDB Superset    inhalation spacing device        Minerals and Electrolytes - Magnesium    MAGNESIUM ORAL 1 capsule, Daily       Multivitamin and Mineral Combinations    VIT C/VIT E/LUTEIN/MIN/OMEGA-3 (OCUVITE ORAL)        Multivitamins    multivitamin capsule 1 capsule, Daily       Nasal Antihistamines    azelastine (ASTELIN) 137 mcg (0.1 %) nasal spray 1 spray, 2 times daily       Nasal Corticosteroids    triamcinolone (NASACORT) 55 mcg nasal inhaler 2 spray, Daily        Patient taking differently: 2 spray Nasal Daily PRN, Reported on 4/20/2018      Thyroid Hormones - Synthetic T4 (Thyroxine)    SYNTHROID 100 mcg tablet         Vitamins - B-12, Cyanocobalamin and derivatives    CYANOCOBALAMIN, VITAMIN B-12, (VITAMIN B-12 SL)        Vitamins - D Derivatives    ERGOCALCIFEROL, VITAMIN D2, (VITAMIN D ORAL) 400 mg         See orders dated 03/28/2019  Sinus film the not show any evidence of chronic or acute sinus disease  Results were discussed with the patient

## 2019-04-01 NOTE — PROGRESS NOTES
Subjective:       Patient ID: Lucita Saarvia is a 69 y.o. female who presents for Sinusitis and Otalgia      Sinusitis   This is a new problem. The current episode started 1 to 4 weeks ago. The problem is unchanged. There has been no fever. The pain is moderate. Associated symptoms include congestion, ear pain and sinus pressure. Pertinent negatives include no chills, coughing, diaphoresis, hoarse voice, shortness of breath or sore throat. Past treatments include saline sprays.   Otalgia    There is pain in both ears. This is a new problem. The current episode started in the past 7 days. The problem occurs every few minutes. The problem has been unchanged. Associated symptoms include rhinorrhea. Pertinent negatives include no abdominal pain, coughing, diarrhea, ear discharge, hearing loss, rash, sore throat or vomiting. She has tried nothing for the symptoms. The treatment provided moderate relief. There is no history of a chronic ear infection.        Review of Systems   Constitutional: Negative for chills, diaphoresis and fever.   HENT: Positive for congestion, ear pain, postnasal drip, rhinorrhea and sinus pressure. Negative for ear discharge, hearing loss, hoarse voice and sore throat.    Eyes: Negative for discharge, redness and itching.   Respiratory: Negative for cough, chest tightness and shortness of breath.    Cardiovascular: Negative for chest pain.   Gastrointestinal: Negative for abdominal pain, constipation, diarrhea, nausea and vomiting.   Musculoskeletal: Negative for arthralgias and myalgias.   Skin: Negative for rash.   Allergic/Immunologic: Positive for environmental allergies.   Neurological: Negative for dizziness, weakness and numbness.   Hematological: Negative for adenopathy.       Objective:      Physical Exam   Constitutional: She is oriented to person, place, and time. Vital signs are normal. She appears well-developed and well-nourished. No distress.   HENT:   Head: Normocephalic and  atraumatic.   Right Ear: Hearing, tympanic membrane, external ear and ear canal normal.   Left Ear: Hearing, tympanic membrane, external ear and ear canal normal.   Nose: Rhinorrhea present. Right sinus exhibits no maxillary sinus tenderness and no frontal sinus tenderness. Left sinus exhibits no maxillary sinus tenderness and no frontal sinus tenderness.   Mouth/Throat: Uvula is midline and mucous membranes are normal. No oropharyngeal exudate or posterior oropharyngeal erythema.   Eyes: Conjunctivae and lids are normal.   Cardiovascular: Normal rate, regular rhythm, normal heart sounds and intact distal pulses.   No murmur heard.  Pulmonary/Chest: Effort normal and breath sounds normal. She has no wheezes.   Abdominal: Soft. Bowel sounds are normal. She exhibits no distension. There is no tenderness.   Musculoskeletal: Normal range of motion. She exhibits no edema.   Neurological: She is alert and oriented to person, place, and time.   Skin: Skin is warm, dry and intact. No rash noted. She is not diaphoretic.   Psychiatric: She has a normal mood and affect.   Vitals reviewed.      Assessment/Plan:       1. Allergic rhinitis due to pollen, unspecified seasonality  - azelastine (ASTELIN) 137 mcg (0.1 %) nasal spray; 1 spray (137 mcg total) by Nasal route 2 (two) times daily.  Dispense: 30 mL; Refill: 0  - add mucinex D as needed    Call if no improvement in symptoms    Yesi Solano MD

## 2019-05-27 ENCOUNTER — TELEPHONE (OUTPATIENT)
Dept: INTERNAL MEDICINE | Facility: CLINIC | Age: 69
End: 2019-05-27

## 2019-05-27 DIAGNOSIS — Z00.00 HEALTHCARE MAINTENANCE: Primary | ICD-10-CM

## 2019-05-27 DIAGNOSIS — Z13.6 SCREENING FOR CARDIOVASCULAR CONDITION: ICD-10-CM

## 2019-05-27 DIAGNOSIS — E66.9 OBESITY (BMI 30-39.9): ICD-10-CM

## 2019-05-27 DIAGNOSIS — R73.03 PREDIABETES: ICD-10-CM

## 2019-05-27 DIAGNOSIS — E03.9 HYPOTHYROIDISM, UNSPECIFIED TYPE: ICD-10-CM

## 2019-05-27 NOTE — TELEPHONE ENCOUNTER
----- Message from Marilyn Cha sent at 5/25/2019  8:42 AM CDT -----  Contact: Patient   Doctor appointment and lab have been scheduled.  Please link lab orders to the lab appointment.  Date of doctor appointment:  6/20/19  Date of lab appointment:  6/13/19  Physical or EP: EPP

## 2019-06-13 ENCOUNTER — LAB VISIT (OUTPATIENT)
Dept: LAB | Facility: HOSPITAL | Age: 69
End: 2019-06-13
Attending: INTERNAL MEDICINE
Payer: MEDICARE

## 2019-06-13 DIAGNOSIS — E66.9 OBESITY (BMI 30-39.9): ICD-10-CM

## 2019-06-13 DIAGNOSIS — E03.9 HYPOTHYROIDISM, UNSPECIFIED TYPE: ICD-10-CM

## 2019-06-13 DIAGNOSIS — Z13.6 SCREENING FOR CARDIOVASCULAR CONDITION: ICD-10-CM

## 2019-06-13 DIAGNOSIS — Z00.00 HEALTHCARE MAINTENANCE: ICD-10-CM

## 2019-06-13 DIAGNOSIS — R73.03 PREDIABETES: ICD-10-CM

## 2019-06-13 LAB
25(OH)D3+25(OH)D2 SERPL-MCNC: 28 NG/ML (ref 30–96)
ALBUMIN SERPL BCP-MCNC: 3.9 G/DL (ref 3.5–5.2)
ALP SERPL-CCNC: 78 U/L (ref 55–135)
ALT SERPL W/O P-5'-P-CCNC: 17 U/L (ref 10–44)
ANION GAP SERPL CALC-SCNC: 10 MMOL/L (ref 8–16)
AST SERPL-CCNC: 22 U/L (ref 10–40)
BASOPHILS # BLD AUTO: 0.06 K/UL (ref 0–0.2)
BASOPHILS NFR BLD: 0.8 % (ref 0–1.9)
BILIRUB SERPL-MCNC: 0.4 MG/DL (ref 0.1–1)
BUN SERPL-MCNC: 13 MG/DL (ref 8–23)
CALCIUM SERPL-MCNC: 10.2 MG/DL (ref 8.7–10.5)
CHLORIDE SERPL-SCNC: 108 MMOL/L (ref 95–110)
CHOLEST SERPL-MCNC: 196 MG/DL (ref 120–199)
CHOLEST/HDLC SERPL: 3.3 {RATIO} (ref 2–5)
CO2 SERPL-SCNC: 23 MMOL/L (ref 23–29)
CREAT SERPL-MCNC: 0.8 MG/DL (ref 0.5–1.4)
DIFFERENTIAL METHOD: ABNORMAL
EOSINOPHIL # BLD AUTO: 0.2 K/UL (ref 0–0.5)
EOSINOPHIL NFR BLD: 2.6 % (ref 0–8)
ERYTHROCYTE [DISTWIDTH] IN BLOOD BY AUTOMATED COUNT: 13.7 % (ref 11.5–14.5)
EST. GFR  (AFRICAN AMERICAN): >60 ML/MIN/1.73 M^2
EST. GFR  (NON AFRICAN AMERICAN): >60 ML/MIN/1.73 M^2
ESTIMATED AVG GLUCOSE: 114 MG/DL (ref 68–131)
GLUCOSE SERPL-MCNC: 93 MG/DL (ref 70–110)
HBA1C MFR BLD HPLC: 5.6 % (ref 4–5.6)
HCT VFR BLD AUTO: 47 % (ref 37–48.5)
HDLC SERPL-MCNC: 60 MG/DL (ref 40–75)
HDLC SERPL: 30.6 % (ref 20–50)
HGB BLD-MCNC: 14.5 G/DL (ref 12–16)
IMM GRANULOCYTES # BLD AUTO: 0.01 K/UL (ref 0–0.04)
IMM GRANULOCYTES NFR BLD AUTO: 0.1 % (ref 0–0.5)
LDLC SERPL CALC-MCNC: 104.4 MG/DL (ref 63–159)
LYMPHOCYTES # BLD AUTO: 2.6 K/UL (ref 1–4.8)
LYMPHOCYTES NFR BLD: 34.8 % (ref 18–48)
MCH RBC QN AUTO: 28.6 PG (ref 27–31)
MCHC RBC AUTO-ENTMCNC: 30.9 G/DL (ref 32–36)
MCV RBC AUTO: 93 FL (ref 82–98)
MONOCYTES # BLD AUTO: 0.5 K/UL (ref 0.3–1)
MONOCYTES NFR BLD: 6.3 % (ref 4–15)
NEUTROPHILS # BLD AUTO: 4.1 K/UL (ref 1.8–7.7)
NEUTROPHILS NFR BLD: 55.4 % (ref 38–73)
NONHDLC SERPL-MCNC: 136 MG/DL
NRBC BLD-RTO: 0 /100 WBC
PLATELET # BLD AUTO: 247 K/UL (ref 150–350)
PMV BLD AUTO: 10 FL (ref 9.2–12.9)
POTASSIUM SERPL-SCNC: 4.4 MMOL/L (ref 3.5–5.1)
PROT SERPL-MCNC: 6.9 G/DL (ref 6–8.4)
RBC # BLD AUTO: 5.07 M/UL (ref 4–5.4)
SODIUM SERPL-SCNC: 141 MMOL/L (ref 136–145)
TRIGL SERPL-MCNC: 158 MG/DL (ref 30–150)
TSH SERPL DL<=0.005 MIU/L-ACNC: 0.62 UIU/ML (ref 0.4–4)
WBC # BLD AUTO: 7.44 K/UL (ref 3.9–12.7)

## 2019-06-13 PROCEDURE — 84443 ASSAY THYROID STIM HORMONE: CPT | Mod: HCNC

## 2019-06-13 PROCEDURE — 80061 LIPID PANEL: CPT | Mod: HCNC

## 2019-06-13 PROCEDURE — 80053 COMPREHEN METABOLIC PANEL: CPT | Mod: HCNC

## 2019-06-13 PROCEDURE — 85025 COMPLETE CBC W/AUTO DIFF WBC: CPT | Mod: HCNC

## 2019-06-13 PROCEDURE — 83036 HEMOGLOBIN GLYCOSYLATED A1C: CPT | Mod: HCNC

## 2019-06-13 PROCEDURE — 36415 COLL VENOUS BLD VENIPUNCTURE: CPT | Mod: HCNC,PO

## 2019-06-13 PROCEDURE — 82306 VITAMIN D 25 HYDROXY: CPT | Mod: HCNC

## 2019-06-20 ENCOUNTER — OFFICE VISIT (OUTPATIENT)
Dept: INTERNAL MEDICINE | Facility: CLINIC | Age: 69
End: 2019-06-20
Payer: MEDICARE

## 2019-06-20 VITALS
RESPIRATION RATE: 16 BRPM | DIASTOLIC BLOOD PRESSURE: 80 MMHG | HEART RATE: 76 BPM | HEIGHT: 64 IN | TEMPERATURE: 99 F | SYSTOLIC BLOOD PRESSURE: 132 MMHG | BODY MASS INDEX: 30.48 KG/M2 | WEIGHT: 178.56 LBS

## 2019-06-20 DIAGNOSIS — Z23 NEED FOR TDAP VACCINATION: ICD-10-CM

## 2019-06-20 DIAGNOSIS — Z12.31 VISIT FOR SCREENING MAMMOGRAM: ICD-10-CM

## 2019-06-20 DIAGNOSIS — Z78.0 ASYMPTOMATIC POSTMENOPAUSAL STATE: ICD-10-CM

## 2019-06-20 DIAGNOSIS — Z23 NEED FOR SHINGLES VACCINE: ICD-10-CM

## 2019-06-20 DIAGNOSIS — Z00.00 HEALTHCARE MAINTENANCE: Primary | ICD-10-CM

## 2019-06-20 DIAGNOSIS — H69.90 DYSFUNCTION OF EUSTACHIAN TUBE, UNSPECIFIED LATERALITY: ICD-10-CM

## 2019-06-20 PROCEDURE — 99999 PR PBB SHADOW E&M-EST. PATIENT-LVL IV: CPT | Mod: PBBFAC,HCNC,, | Performed by: INTERNAL MEDICINE

## 2019-06-20 PROCEDURE — 99499 RISK ADDL DX/OHS AUDIT: ICD-10-PCS | Mod: S$GLB,,, | Performed by: INTERNAL MEDICINE

## 2019-06-20 PROCEDURE — 99499 UNLISTED E&M SERVICE: CPT | Mod: S$GLB,,, | Performed by: INTERNAL MEDICINE

## 2019-06-20 PROCEDURE — 99215 PR OFFICE/OUTPT VISIT, EST, LEVL V, 40-54 MIN: ICD-10-PCS | Mod: HCNC,S$GLB,, | Performed by: INTERNAL MEDICINE

## 2019-06-20 PROCEDURE — 1101F PT FALLS ASSESS-DOCD LE1/YR: CPT | Mod: HCNC,CPTII,S$GLB, | Performed by: INTERNAL MEDICINE

## 2019-06-20 PROCEDURE — 99999 PR PBB SHADOW E&M-EST. PATIENT-LVL IV: ICD-10-PCS | Mod: PBBFAC,HCNC,, | Performed by: INTERNAL MEDICINE

## 2019-06-20 PROCEDURE — 99215 OFFICE O/P EST HI 40 MIN: CPT | Mod: HCNC,S$GLB,, | Performed by: INTERNAL MEDICINE

## 2019-06-20 PROCEDURE — 1101F PR PT FALLS ASSESS DOC 0-1 FALLS W/OUT INJ PAST YR: ICD-10-PCS | Mod: HCNC,CPTII,S$GLB, | Performed by: INTERNAL MEDICINE

## 2019-06-20 RX ORDER — NEOMYCIN SULFATE, POLYMYXIN B SULFATE, HYDROCORTISONE 3.5; 10000; 1 MG/ML; [USP'U]/ML; MG/ML
SOLUTION/ DROPS AURICULAR (OTIC)
Refills: 1 | COMMUNITY
Start: 2019-05-30 | End: 2020-11-23 | Stop reason: ALTCHOICE

## 2019-06-20 RX ORDER — AZELASTINE 1 MG/ML
1 SPRAY, METERED NASAL 2 TIMES DAILY
Qty: 30 ML | Refills: 3 | Status: SHIPPED | OUTPATIENT
Start: 2019-06-20 | End: 2020-11-23

## 2019-06-20 RX ORDER — FERROUS FUMARATE/DOCUSATE 150-100 MG
TABLET, EXTENDED RELEASE ORAL
Refills: 0 | COMMUNITY
Start: 2019-05-31 | End: 2019-12-28

## 2019-06-20 NOTE — PROGRESS NOTES
Subjective:       Patient ID: Lucita Saravia is a 69 y.o. female.    Chief Complaint: Annual Exam    HPI    69 y.o. female hereto discuss healthcare maintenance and f/u of chronic medical conditions.     Health Maintenance:   Lipid disorders/ASCVD risk: utd    DM: A1c 5.6  Hepatitis C (1990-1978): completed   Breast Cancer (40-74y): Mammogram due   Colorectal Cancer (50-75yr): Colonoscopy 2011   Bone Density (F>64 yo, M >71yo): DEXA  due       Vaccines:   Influenza (yearly)    Tetanus (every 10 yrs - 1st tdap) due    PPSV23(>64yo or <65 w/ lung dz, smoking, DM) due   PCV13 (> 65 or <65 w/ immunocompromised) 2016   Zoster (>59yo) due       Medical Problems:  1. hypothyroid: levothyroxine 100mcg qAM        Past Medical History:   Diagnosis Date    Female infertility NEC     History of degenerative disc disease     neck    Hypothyroidism      Past Surgical History:   Procedure Laterality Date    APPENDECTOMY      EYE SURGERY      LASIK    LASIK      PELVIC LAPAROSCOPY      fertillity    TONSILLECTOMY, ADENOIDECTOMY      TOTAL ABDOMINAL HYSTERECTOMY      endometrisosis     Family History   Problem Relation Age of Onset    Alzheimer's disease Mother     Stroke Mother     COPD Father     Cancer Sister         breast cancer    Alzheimer's disease Sister     Kidney nephrosis Daughter     Cancer Paternal Grandmother         rectal cancer     Social History     Socioeconomic History    Marital status:      Spouse name: Not on file    Number of children: Not on file    Years of education: Not on file    Highest education level: Not on file   Occupational History    Occupation: disability for neck pain   Social Needs    Financial resource strain: Not on file    Food insecurity:     Worry: Not on file     Inability: Not on file    Transportation needs:     Medical: Not on file     Non-medical: Not on file   Tobacco Use    Smoking status: Never Smoker    Smokeless tobacco: Never Used   Substance  and Sexual Activity    Alcohol use: Yes     Comment: rare    Drug use: No    Sexual activity: Not Currently   Lifestyle    Physical activity:     Days per week: Not on file     Minutes per session: Not on file    Stress: Not on file   Relationships    Social connections:     Talks on phone: Not on file     Gets together: Not on file     Attends [employer] service: Not on file     Active member of club or organization: Not on file     Attends meetings of clubs or organizations: Not on file     Relationship status: Not on file   Other Topics Concern    Not on file   Social History Narrative    Single, lives alone, retired -but works PT serving at [employer]     Review of patient's allergies indicates:  No Known Allergies    Current Outpatient Medications:     b complex vitamins capsule, Take 1 capsule by mouth once daily., Disp: , Rfl:     CYANOCOBALAMIN, VITAMIN B-12, (VITAMIN B-12 SL), Place under the tongue., Disp: , Rfl:     ERGOCALCIFEROL, VITAMIN D2, (VITAMIN D ORAL), Take 400 mg by mouth., Disp: , Rfl:     fish oil-omega-3 fatty acids 300-1,000 mg capsule, Take 2 g by mouth once daily., Disp: , Rfl:     multivitamin capsule, Take 1 capsule by mouth once daily., Disp: , Rfl:     NASAL DECONGESTANT, PSEUDOEPH, 30 mg tablet, , Disp: , Rfl: 0    neomycin-polymyxin-hydrocortisone (CORTISPORIN) otic solution, , Disp: , Rfl: 1    sennosides 17.2 mg Tab, , Disp: , Rfl:     SYNTHROID 100 mcg tablet, TAKE 1 TABLET EVERY MORNING, Disp: 90 tablet, Rfl: 2    triamcinolone (NASACORT) 55 mcg nasal inhaler, 2 sprays by Nasal route once daily. (Patient taking differently: 2 sprays by Nasal route daily as needed. ), Disp: 16 g, Rfl: 3    VIT C/VIT E/LUTEIN/MIN/OMEGA-3 (OCUVITE ORAL), Take by mouth., Disp: , Rfl:     azelastine (ASTELIN) 137 mcg (0.1 %) nasal spray, 1 spray (137 mcg total) by Nasal route 2 (two) times daily., Disp: 30 mL, Rfl: 3    diphth,pertus,acell,,tetanus (BOOSTRIX) 2.5-8-5 Lf-mcg-Lf/0.5mL  "Syrg injection, Inject 0.5 mLs into the muscle once. for 1 dose, Disp: 0.5 mL, Rfl: 0    varicella-zoster gE-AS01B, PF, (SHINGRIX, PF,) 50 mcg/0.5 mL injection, Inject 0.5 mLs into the muscle once. for 1 dose, Disp: 0.5 mL, Rfl: 1    Review of Systems   Constitutional: Negative for chills and fever.   HENT: Positive for congestion and ear pain (right). Negative for trouble swallowing.    Eyes: Negative for discharge and redness.   Respiratory: Negative for cough and shortness of breath.    Cardiovascular: Negative for chest pain and leg swelling.   Gastrointestinal: Negative for abdominal pain, blood in stool, constipation and diarrhea.   Genitourinary: Negative for difficulty urinating and dysuria.   Musculoskeletal: Negative for gait problem and joint swelling.   Skin: Negative for pallor, rash and wound.   Neurological: Negative for numbness and headaches.       Objective:        Vitals:    06/20/19 1259 06/20/19 1336   BP: (!) 144/80 132/80   BP Location: Right arm    Patient Position: Sitting    BP Method: Large (Manual)    Pulse: 76    Resp: 16    Temp: 98.8 °F (37.1 °C)    TempSrc: Oral    Weight: 81 kg (178 lb 9.2 oz)    Height: 5' 4" (1.626 m)        Body mass index is 30.65 kg/m².    Physical Exam   Constitutional: She is oriented to person, place, and time. She appears well-developed. No distress.   HENT:   Head: Normocephalic and atraumatic.   Right Ear: A middle ear effusion is present.   Left Ear: Tympanic membrane normal.   Nose: Nose normal.   Mouth/Throat: Oropharynx is clear and moist.   Eyes: Conjunctivae and EOM are normal.   Neck: Normal range of motion. Neck supple. No tracheal deviation present. No thyromegaly present.   Cardiovascular: Normal rate, regular rhythm, normal heart sounds and intact distal pulses.   Pulmonary/Chest: Effort normal and breath sounds normal.   Abdominal: Soft. Bowel sounds are normal. She exhibits no distension. There is no tenderness.   Musculoskeletal: Normal " range of motion. She exhibits no edema.   Lymphadenopathy:     She has no cervical adenopathy.   Neurological: She is alert and oriented to person, place, and time. No sensory deficit.   Skin: Skin is warm and dry. She is not diaphoretic. No cyanosis. Nails show no clubbing.   Psychiatric: She has a normal mood and affect. Her behavior is normal. Judgment normal.       Assessment:     1. Healthcare maintenance    2. Visit for screening mammogram    3. Asymptomatic postmenopausal state    4. Need for Tdap vaccination    5. Need for shingles vaccine    6. Dysfunction of Eustachian tube, unspecified laterality           Plan:         1. Healthcare maintenance  - labs reviewed w/ pt  - recommend PPSV23  - Mammo Digital Screening Bilat w/ Gabino; Future  - DXA Bone Density Spine And Hip; Future  - diphth,pertus,acell,,tetanus (BOOSTRIX) 2.5-8-5 Lf-mcg-Lf/0.5mL Syrg injection; Inject 0.5 mLs into the muscle once. for 1 dose  Dispense: 0.5 mL; Refill: 0  - varicella-zoster gE-AS01B, PF, (SHINGRIX, PF,) 50 mcg/0.5 mL injection; Inject 0.5 mLs into the muscle once. for 1 dose  Dispense: 0.5 mL; Refill: 1    2. Visit for screening mammogram  - Mammo Digital Screening Bilat w/ Gabino; Future    3. Asymptomatic postmenopausal state  - DXA Bone Density Spine And Hip; Future    4. Need for Tdap vaccination  - diphth,pertus,acell,,tetanus (BOOSTRIX) 2.5-8-5 Lf-mcg-Lf/0.5mL Syrg injection; Inject 0.5 mLs into the muscle once. for 1 dose  Dispense: 0.5 mL; Refill: 0    5. Need for shingles vaccine  - varicella-zoster gE-AS01B, PF, (SHINGRIX, PF,) 50 mcg/0.5 mL injection; Inject 0.5 mLs into the muscle once. for 1 dose  Dispense: 0.5 mL; Refill: 1    6. Dysfunction of Eustachian tube, unspecified laterality  - continue nasacort  - add simply saline spray  - she has established with outside ENT for recurrent otitis media- he recommends continue nasacort  - azelastine (ASTELIN) 137 mcg (0.1 %) nasal spray; 1 spray (137 mcg total) by Nasal  route 2 (two) times daily.  Dispense: 30 mL; Refill: 3           Patient note was created using MModal Dictation.  Any errors in syntax or even information may not have been identified and edited on initial review prior to signing this note.

## 2019-06-20 NOTE — PATIENT INSTRUCTIONS
Shingrix is a two part vaccines series to prevent shingles. You should receive the second part of the vaccine anytime between 2 to 6 months after the initial dose.   Https://www.cdc.gov/vaccines/vpd/shingles/public/shingrix/index.html    ______________________________________________________________    Consider the pneumonia vaccine - pneumovax (PPSV23)      Pneumococcal Vaccination  There are 2 pneumococcal vaccinations that protect people against pneumococcal disease.  Pneumococcal disease  Pneumococcal disease is caused by bacteria (Streptococcus pneumoniae). This germ is easily spread when someone with the bacteria coughs, sneezes, laughs, or talks. You can get pneumococcal disease more than once. This is because there are many different types (strains) of the bacteria. Some strains are also resistant to treatment with antibiotics.  There are different kinds of pneumococcal disease, depending on what part of the body is infected. They include:  · Pneumonia. Infection in the lungs.  · Meningitis. Infection of the covering of the brain and spinal cord.  · Otitis media. Infection of the middle ear.  · Bacteremia or septicemia. Infection in the blood.  Pneumococcal disease can be life-threatening, especially for people in high-risk groups. Each year, thousands of people die of this disease. Thousands more become seriously ill.  The vaccine    The pneumococcal vaccines are the best way to avoid pneumococcal disease. They are safe and effective. The vaccines are given as shots (injections). This can be done at your healthcare provider's office or a health clinic. Drugstores, senior centers, and workplaces often offer vaccinations, too. If you have questions, ask your healthcare provider.  The pneumococcal vaccines are recommended for:  · Persons 65 and older  · Infants  · People with chronic health problems (such as diabetes, chronic lung or heart disease, liver disease)  · People who have a cochlear implant  · People  who have weakened immune systems  · People who live in nursing homes or other long-term care facilities  · People who smoke or have asthma  They are given:  · In a 4-dose series in infants  · One time in adults; some people need a second dose of one of the vaccines  Your healthcare provider can tell you more about the vaccines, whether you should get them, and the number of shots you should get.  Date Last Reviewed: 11/1/2016  © 0937-6991 [employer]. [address] [place] [address]. All rights reserved. This information is not intended as a substitute for professional medical care. Always follow your healthcare professional's instructions.    ____________________________________      Earache, No Infection (Adult)  Earaches can happen without an infection. This occurs when air and fluid build up behind the eardrum causing a feeling of fullness and discomfort and reduced hearing. This is called otitis media with effusion (OME) or serous otitis media. It means there is fluid in the middle ear. It is not the same as acute otitis media, which is typically from infection.  OME can happen when you have a cold if congestion blocks the passage that drains the middle ear. This passage is called the eustachian tube. OME may also occur with nasal allergies or after a bacterial middle ear infection.    The pain or discomfort may come and go. You may hear clicking or popping sounds when you chew or swallow. You may feel that your balance is off. Or you may hear ringing in the ear.  It often takes from several weeks up to 3 months for the fluid to clear on its own. Oral pain relievers and ear drops help if there is pain. Decongestants and antihistamines sometimes help. Antibiotics don't help since there is no infection. Your doctor may prescribe a nasal spray to help reduce swelling in the nose and eustachian tube. This can allow the ear to drain.  If your OME doesn't improve after 3 months, surgery may be  used to drain the fluid and insert a small tube in the eardrum to allow continued drainage.  Because the middle ear fluid can become infected, it is important to watch for signs of an ear infection which may develop later. These signs include increased ear pain, fever, or drainage from the ear.  Home care  The following guidelines will help you care for yourself at home:  · You may use over-the-counter medicine as directed to control pain, unless another medicine was prescribed. If you have chronic liver or kidney disease or ever had a stomach ulcer or GI bleeding, talk with your doctor before using these medicines. Aspirin should never be used in anyone under 18 years of age who is ill with a fever. It may cause severe liver damage.  · You may use over-the-counter decongestants such as phenylephrine or pseudoephedrine. But they are not always helpful. Don't use nasal spray decongestants more than 3 days. Longer use can make congestion worse. Prescription nasal sprays from your doctor don't typically have those restrictions.  · Antihistamines may help if you are also having allergy symptoms.  · You may use medicines such as guaifenesin to thin mucus and promote drainage.  Follow-up care  Follow up with your healthcare provider or as advised if you are not feeling better after 3 days.  When to seek medical advice  Call your healthcare provider right away if any of the following occur:  · Your ear pain gets worse or does not start to improve   · Fever of 100.4°F (38°C) or higher, or as directed by your healthcare provider  · Fluid or blood draining from the ear  · Headache or sinus pain  · Stiff neck  · Unusual drowsiness or confusion  Date Last Reviewed: 10/1/2016  © 0204-9809 [employer]. [address] [place] [address], [place] [address]. All rights reserved. This information is not intended as a substitute for professional medical care. Always follow your healthcare professional's instructions.

## 2019-06-26 ENCOUNTER — OCCUPATIONAL HEALTH (OUTPATIENT)
Dept: URGENT CARE | Facility: CLINIC | Age: 69
End: 2019-06-26
Payer: MEDICARE

## 2019-06-26 DIAGNOSIS — Z02.83 ENCOUNTER FOR DRUG SCREENING: Primary | ICD-10-CM

## 2019-06-26 PROCEDURE — 80305 OOH COLLECTION ONLY DRUG SCREEN: ICD-10-PCS | Mod: S$GLB,,, | Performed by: PREVENTIVE MEDICINE

## 2019-06-26 PROCEDURE — 80305 DRUG TEST PRSMV DIR OPT OBS: CPT | Mod: S$GLB,,, | Performed by: PREVENTIVE MEDICINE

## 2019-07-23 ENCOUNTER — TELEPHONE (OUTPATIENT)
Dept: INTERNAL MEDICINE | Facility: CLINIC | Age: 69
End: 2019-07-23

## 2019-07-23 NOTE — TELEPHONE ENCOUNTER
Recommend she get letter from her orthopedist or pain doctor. I haven't evaluated pt for this condition and have no past records for it either

## 2019-07-23 NOTE — TELEPHONE ENCOUNTER
Patient would like a letter for exemption from jury duty because of her disability cervical spine injury.

## 2019-07-23 NOTE — TELEPHONE ENCOUNTER
----- Message from Kely Shaffer sent at 7/23/2019  8:47 AM CDT -----  Contact: [phone]  Patient is calling for a medical note for jury duty exemption. Patient states she is on disability for a cervical spine injury.  Please call & advise

## 2019-10-03 ENCOUNTER — PES CALL (OUTPATIENT)
Dept: ADMINISTRATIVE | Facility: CLINIC | Age: 69
End: 2019-10-03

## 2019-10-09 DIAGNOSIS — E03.4 HYPOTHYROIDISM DUE TO ACQUIRED ATROPHY OF THYROID: ICD-10-CM

## 2019-10-09 RX ORDER — LEVOTHYROXINE SODIUM 100 UG/1
TABLET ORAL
Qty: 90 TABLET | Refills: 2 | Status: SHIPPED | OUTPATIENT
Start: 2019-10-09 | End: 2020-10-11

## 2019-11-11 ENCOUNTER — OFFICE VISIT (OUTPATIENT)
Dept: INTERNAL MEDICINE | Facility: CLINIC | Age: 69
End: 2019-11-11
Payer: MEDICARE

## 2019-11-11 VITALS
HEIGHT: 67 IN | RESPIRATION RATE: 17 BRPM | TEMPERATURE: 99 F | WEIGHT: 174.63 LBS | DIASTOLIC BLOOD PRESSURE: 65 MMHG | SYSTOLIC BLOOD PRESSURE: 127 MMHG | BODY MASS INDEX: 27.41 KG/M2 | HEART RATE: 74 BPM

## 2019-11-11 DIAGNOSIS — R35.0 URINARY FREQUENCY: Primary | ICD-10-CM

## 2019-11-11 LAB
BACTERIA #/AREA URNS AUTO: ABNORMAL /HPF
BILIRUB UR QL STRIP: NEGATIVE
CLARITY UR REFRACT.AUTO: ABNORMAL
COLOR UR AUTO: YELLOW
GLUCOSE UR QL STRIP: NEGATIVE
HGB UR QL STRIP: ABNORMAL
KETONES UR QL STRIP: NEGATIVE
LEUKOCYTE ESTERASE UR QL STRIP: ABNORMAL
MICROSCOPIC COMMENT: ABNORMAL
NITRITE UR QL STRIP: POSITIVE
PH UR STRIP: 6 [PH] (ref 5–8)
PROT UR QL STRIP: NEGATIVE
RBC #/AREA URNS AUTO: 3 /HPF (ref 0–4)
SP GR UR STRIP: 1 (ref 1–1.03)
URN SPEC COLLECT METH UR: ABNORMAL
WBC #/AREA URNS AUTO: >100 /HPF (ref 0–5)
WBC CLUMPS UR QL AUTO: ABNORMAL

## 2019-11-11 PROCEDURE — 1101F PT FALLS ASSESS-DOCD LE1/YR: CPT | Mod: HCNC,CPTII,S$GLB, | Performed by: INTERNAL MEDICINE

## 2019-11-11 PROCEDURE — 99999 PR PBB SHADOW E&M-EST. PATIENT-LVL III: CPT | Mod: PBBFAC,HCNC,, | Performed by: INTERNAL MEDICINE

## 2019-11-11 PROCEDURE — 87077 CULTURE AEROBIC IDENTIFY: CPT | Mod: HCNC

## 2019-11-11 PROCEDURE — 81001 URINALYSIS AUTO W/SCOPE: CPT | Mod: HCNC

## 2019-11-11 PROCEDURE — 1159F MED LIST DOCD IN RCRD: CPT | Mod: HCNC,S$GLB,, | Performed by: INTERNAL MEDICINE

## 2019-11-11 PROCEDURE — 87186 SC STD MICRODIL/AGAR DIL: CPT | Mod: HCNC

## 2019-11-11 PROCEDURE — 1126F PR PAIN SEVERITY QUANTIFIED, NO PAIN PRESENT: ICD-10-PCS | Mod: HCNC,S$GLB,, | Performed by: INTERNAL MEDICINE

## 2019-11-11 PROCEDURE — 1101F PR PT FALLS ASSESS DOC 0-1 FALLS W/OUT INJ PAST YR: ICD-10-PCS | Mod: HCNC,CPTII,S$GLB, | Performed by: INTERNAL MEDICINE

## 2019-11-11 PROCEDURE — 99999 PR PBB SHADOW E&M-EST. PATIENT-LVL III: ICD-10-PCS | Mod: PBBFAC,HCNC,, | Performed by: INTERNAL MEDICINE

## 2019-11-11 PROCEDURE — 87086 URINE CULTURE/COLONY COUNT: CPT | Mod: HCNC

## 2019-11-11 PROCEDURE — 99213 OFFICE O/P EST LOW 20 MIN: CPT | Mod: HCNC,S$GLB,, | Performed by: INTERNAL MEDICINE

## 2019-11-11 PROCEDURE — 1159F PR MEDICATION LIST DOCUMENTED IN MEDICAL RECORD: ICD-10-PCS | Mod: HCNC,S$GLB,, | Performed by: INTERNAL MEDICINE

## 2019-11-11 PROCEDURE — 87088 URINE BACTERIA CULTURE: CPT | Mod: HCNC

## 2019-11-11 PROCEDURE — 99213 PR OFFICE/OUTPT VISIT, EST, LEVL III, 20-29 MIN: ICD-10-PCS | Mod: HCNC,S$GLB,, | Performed by: INTERNAL MEDICINE

## 2019-11-11 PROCEDURE — 1126F AMNT PAIN NOTED NONE PRSNT: CPT | Mod: HCNC,S$GLB,, | Performed by: INTERNAL MEDICINE

## 2019-11-11 RX ORDER — SULFAMETHOXAZOLE AND TRIMETHOPRIM 800; 160 MG/1; MG/1
1 TABLET ORAL 2 TIMES DAILY
Qty: 10 TABLET | Refills: 0 | Status: SHIPPED | OUTPATIENT
Start: 2019-11-11 | End: 2019-11-16

## 2019-11-11 NOTE — PROGRESS NOTES
CC:  Urinary frequency  HPI:  The patient is a 69 y.o. year old female who presents to the office for urinary frequency, pressure and discomfort.  Her symptoms started yesterday.  She denies any fever, nausea or vomiting.    PAST MEDICAL HISTORY:  Past Medical History:   Diagnosis Date    Female infertility NEC     History of degenerative disc disease     neck    Hypothyroidism        SURGICAL HISTORY:  Past Surgical History:   Procedure Laterality Date    APPENDECTOMY      EYE SURGERY      LASIK    LASIK      PELVIC LAPAROSCOPY      fertillity    TONSILLECTOMY, ADENOIDECTOMY      TOTAL ABDOMINAL HYSTERECTOMY      endometrisosis       MEDS:  Medcard reviewed and updated    ALLERGIES: Allergy Card reviewed and updated    SOCIAL HISTORY:   The patient is a nonsmoker.    PE:   APPEARANCE: Well nourished, well developed, in no acute distress.    CHEST: Lungs clear to auscultation with unlabored respirations.  CARDIOVASCULAR: Normal S1, S2. No murmurs. No carotid bruits. No pedal edema.  ABDOMEN: Bowel sounds normal. Not distended. Soft. No tenderness or masses.   PSYCHIATRIC: The patient is oriented to person, place, and time and has a pleasant affect.        ASSESSMENT/PLAN:  Lucita was seen today for urinary tract infection.    Diagnoses and all orders for this visit:    Urinary frequency  -     Urinalysis  -     Urine culture    Other orders  -     sulfamethoxazole-trimethoprim 800-160mg (BACTRIM DS) 800-160 mg Tab; Take 1 tablet by mouth 2 (two) times daily. for 5 days  -     Urinalysis Microscopic

## 2019-11-14 LAB — BACTERIA UR CULT: ABNORMAL

## 2019-11-15 ENCOUNTER — TELEPHONE (OUTPATIENT)
Dept: INTERNAL MEDICINE | Facility: CLINIC | Age: 69
End: 2019-11-15

## 2019-11-15 RX ORDER — AMOXICILLIN AND CLAVULANATE POTASSIUM 875; 125 MG/1; MG/1
1 TABLET, FILM COATED ORAL EVERY 12 HOURS
Qty: 14 TABLET | Refills: 0 | Status: SHIPPED | OUTPATIENT
Start: 2019-11-15 | End: 2019-11-22

## 2019-11-15 NOTE — TELEPHONE ENCOUNTER
Please inform patient that urine cultures positive for UTI, resistant to the Bactrim prescribed.  Prescription for Augmentin has been sent to [facility].

## 2019-12-12 ENCOUNTER — OFFICE VISIT (OUTPATIENT)
Dept: PRIMARY CARE CLINIC | Facility: CLINIC | Age: 69
End: 2019-12-12
Payer: MEDICARE

## 2019-12-12 VITALS
TEMPERATURE: 98 F | OXYGEN SATURATION: 97 % | HEIGHT: 67 IN | WEIGHT: 178.56 LBS | HEART RATE: 71 BPM | DIASTOLIC BLOOD PRESSURE: 70 MMHG | SYSTOLIC BLOOD PRESSURE: 128 MMHG | BODY MASS INDEX: 28.02 KG/M2

## 2019-12-12 DIAGNOSIS — R31.9 URINARY TRACT INFECTION WITH HEMATURIA, SITE UNSPECIFIED: ICD-10-CM

## 2019-12-12 DIAGNOSIS — H66.90 OTITIS MEDIA, UNSPECIFIED LATERALITY, UNSPECIFIED OTITIS MEDIA TYPE: Primary | ICD-10-CM

## 2019-12-12 DIAGNOSIS — J30.2 SEASONAL ALLERGIES: ICD-10-CM

## 2019-12-12 DIAGNOSIS — N39.0 URINARY TRACT INFECTION WITH HEMATURIA, SITE UNSPECIFIED: ICD-10-CM

## 2019-12-12 PROCEDURE — 99214 OFFICE O/P EST MOD 30 MIN: CPT | Mod: HCNC,S$GLB,, | Performed by: FAMILY MEDICINE

## 2019-12-12 PROCEDURE — 99999 PR PBB SHADOW E&M-EST. PATIENT-LVL III: ICD-10-PCS | Mod: PBBFAC,HCNC,, | Performed by: FAMILY MEDICINE

## 2019-12-12 PROCEDURE — 99214 PR OFFICE/OUTPT VISIT, EST, LEVL IV, 30-39 MIN: ICD-10-PCS | Mod: HCNC,S$GLB,, | Performed by: FAMILY MEDICINE

## 2019-12-12 PROCEDURE — 99999 PR PBB SHADOW E&M-EST. PATIENT-LVL III: CPT | Mod: PBBFAC,HCNC,, | Performed by: FAMILY MEDICINE

## 2019-12-12 RX ORDER — SULFAMETHOXAZOLE AND TRIMETHOPRIM 800; 160 MG/1; MG/1
1 TABLET ORAL 2 TIMES DAILY
Qty: 20 TABLET | Refills: 0 | Status: SHIPPED | OUTPATIENT
Start: 2019-12-12 | End: 2019-12-28

## 2019-12-12 RX ORDER — FLUTICASONE PROPIONATE 50 MCG
1 SPRAY, SUSPENSION (ML) NASAL DAILY
Qty: 16 G | Refills: 2 | Status: SHIPPED | OUTPATIENT
Start: 2019-12-12 | End: 2020-11-23

## 2019-12-13 NOTE — PROGRESS NOTES
Pt complaining of facial pressure and cough, green nasal discharge.  Bilateral ear pain symptoms have persisted for 3 weeks over-the-counter medications do not seem to be helping  Past Medical History:   Diagnosis Date    Female infertility NEC     History of degenerative disc disease     neck    Hypothyroidism      Social History     Socioeconomic History    Marital status:      Spouse name: Not on file    Number of children: Not on file    Years of education: Not on file    Highest education level: Not on file   Occupational History    Occupation: disability for neck pain   Social Needs    Financial resource strain: Not on file    Food insecurity:     Worry: Not on file     Inability: Not on file    Transportation needs:     Medical: Not on file     Non-medical: Not on file   Tobacco Use    Smoking status: Never Smoker    Smokeless tobacco: Never Used   Substance and Sexual Activity    Alcohol use: Yes     Comment: rare    Drug use: No    Sexual activity: Not Currently   Lifestyle    Physical activity:     Days per week: Not on file     Minutes per session: Not on file    Stress: Not on file   Relationships    Social connections:     Talks on phone: Not on file     Gets together: Not on file     Attends [employer] service: Not on file     Active member of club or organization: Not on file     Attends meetings of clubs or organizations: Not on file     Relationship status: Not on file   Other Topics Concern    Not on file   Social History Narrative    Single, lives alone, retired -but works PT serving at [employer]     Physical Exam:  VS:  Vitals:    12/12/19 1513   BP: 128/70   Pulse: 71   Temp: 98 °F (36.7 °C)       Heent: TM's red and swollen bilaterally. Ear canalls clear bilaterally.   Frontal and Maxillary Sinuses tender to percussion bilaterally.   Throat clear , no exudate.PERRLA. conjuctiva clear bilaterally.  Neck: Supple nontender, no thyromegaly, no carotid bruits  bilaterally.  Cardiovascular: Regular rate and rhythm no murmurs auscultated.  Lungs: Clear to auscultation bilaterally, with good respiratory excursion,   no evidence of respiratory distress.  Abdomen: Soft nontender, normal bowel sounds no hepatosplenomegaly.  Skin: Good turgor, no lesions observed.  Impression: Acute sinus infection                      Bilateral otitis media unresolved                     Upper respiratory infection  Plan: See med card dated 12/12/2019    Current Outpatient Medications:     azelastine (ASTELIN) 137 mcg (0.1 %) nasal spray, 1 spray (137 mcg total) by Nasal route 2 (two) times daily., Disp: 30 mL, Rfl: 3    b complex vitamins capsule, Take 1 capsule by mouth once daily., Disp: , Rfl:     CYANOCOBALAMIN, VITAMIN B-12, (VITAMIN B-12 SL), Place under the tongue., Disp: , Rfl:     ERGOCALCIFEROL, VITAMIN D2, (VITAMIN D ORAL), Take 400 mg by mouth., Disp: , Rfl:     fish oil-omega-3 fatty acids 300-1,000 mg capsule, Take 2 g by mouth once daily., Disp: , Rfl:     multivitamin capsule, Take 1 capsule by mouth once daily., Disp: , Rfl:     NASAL DECONGESTANT, PSEUDOEPH, 30 mg tablet, , Disp: , Rfl: 0    neomycin-polymyxin-hydrocortisone (CORTISPORIN) otic solution, , Disp: , Rfl: 1    sennosides 17.2 mg Tab, , Disp: , Rfl:     SYNTHROID 100 mcg tablet, TAKE 1 TABLET EVERY MORNING, Disp: 90 tablet, Rfl: 2    triamcinolone (NASACORT) 55 mcg nasal inhaler, 2 sprays by Nasal route once daily. (Patient taking differently: 2 sprays by Nasal route daily as needed. ), Disp: 16 g, Rfl: 3    VIT C/VIT E/LUTEIN/MIN/OMEGA-3 (OCUVITE ORAL), Take by mouth., Disp: , Rfl:     fluticasone propionate (FLONASE) 50 mcg/actuation nasal spray, 1 spray (50 mcg total) by Each Nostril route once daily., Disp: 16 g, Rfl: 2    sulfamethoxazole-trimethoprim 800-160mg (BACTRIM DS) 800-160 mg Tab, Take 1 tablet by mouth 2 (two) times daily., Disp: 20 tablet, Rfl: 0      Recent Lab Results     None

## 2019-12-17 ENCOUNTER — TELEPHONE (OUTPATIENT)
Dept: PRIMARY CARE CLINIC | Facility: CLINIC | Age: 69
End: 2019-12-17

## 2019-12-17 NOTE — TELEPHONE ENCOUNTER
----- Message from Lacey Ann sent at 12/17/2019 10:42 AM CST -----  Contact: Patient [phone]  Patient is calling for an RX refill or new RX.  Is this a refill or new RX:  refill  RX name and strength: amoxicillin (AMOXIL) 875 MG tablet  Directions (copy/paste from chart):    Is this a 30 day or 90 day RX:    Local pharmacy or mail order pharmacy:  local  Pharmacy name and phone # (copy/paste from chart):   [facility] ([place]) - [place] - [address]  Comments:

## 2019-12-17 NOTE — TELEPHONE ENCOUNTER
The antibiotic course before this was amoxillin and it didn't clear it up that's why we put her on bactrim,

## 2019-12-17 NOTE — TELEPHONE ENCOUNTER
"Pt states that the Bactrim has only mildly helped her URI & Ear Infection. Pt is requesting Amoxicillin be called into the pharmacy, because the pt states it works better for her then bactrim.     Pt states that she was previously prescribed bactrim for a UTI but it didn't work for her so it was changed to amoxicillin. Pt stated that "the amoxicillin just works better for her". Tried to explain to the pt why the Rx was changed previously to Amoxicillin based on the culture, and that this is a different scenario. Pt stated "I know that amoxicillin works better for my body. I know about my own health."     Please advise.  "

## 2019-12-17 NOTE — TELEPHONE ENCOUNTER
Pt advised per Dr. Larson, and noted that if she feels a new medication is needed then she should see her PCP Dr. Mcintosh.     Pt stated that she did not choose Dr. Mcintosh as her PCP, and that she has only seen her twice. Explained to the patient that if she did not want Dr. Mcintosh as her PCP then she was able to choose a new PCP. Patient stated that she did not know this was an option. Explained to the patient that she could choose a new PCP, and contact Medicare to let them know of the PCP change. Pt requested Dr. Larson as her new PCP. Explained to the patient that Dr. Larson is not accepting new/transfer patient's at this time.

## 2019-12-28 ENCOUNTER — OFFICE VISIT (OUTPATIENT)
Dept: INTERNAL MEDICINE | Facility: CLINIC | Age: 69
End: 2019-12-28
Payer: MEDICARE

## 2019-12-28 VITALS
HEART RATE: 72 BPM | WEIGHT: 174.19 LBS | SYSTOLIC BLOOD PRESSURE: 130 MMHG | OXYGEN SATURATION: 97 % | DIASTOLIC BLOOD PRESSURE: 80 MMHG | HEIGHT: 66 IN | BODY MASS INDEX: 27.99 KG/M2

## 2019-12-28 DIAGNOSIS — R53.83 FATIGUE, UNSPECIFIED TYPE: ICD-10-CM

## 2019-12-28 DIAGNOSIS — H69.93 ETD (EUSTACHIAN TUBE DYSFUNCTION), BILATERAL: Primary | ICD-10-CM

## 2019-12-28 PROCEDURE — 99999 PR PBB SHADOW E&M-EST. PATIENT-LVL III: CPT | Mod: PBBFAC,HCNC,, | Performed by: FAMILY MEDICINE

## 2019-12-28 PROCEDURE — 99213 PR OFFICE/OUTPT VISIT, EST, LEVL III, 20-29 MIN: ICD-10-PCS | Mod: HCNC,S$GLB,, | Performed by: FAMILY MEDICINE

## 2019-12-28 PROCEDURE — 1126F PR PAIN SEVERITY QUANTIFIED, NO PAIN PRESENT: ICD-10-PCS | Mod: HCNC,S$GLB,, | Performed by: FAMILY MEDICINE

## 2019-12-28 PROCEDURE — 1159F PR MEDICATION LIST DOCUMENTED IN MEDICAL RECORD: ICD-10-PCS | Mod: HCNC,S$GLB,, | Performed by: FAMILY MEDICINE

## 2019-12-28 PROCEDURE — 1101F PR PT FALLS ASSESS DOC 0-1 FALLS W/OUT INJ PAST YR: ICD-10-PCS | Mod: HCNC,CPTII,S$GLB, | Performed by: FAMILY MEDICINE

## 2019-12-28 PROCEDURE — 1159F MED LIST DOCD IN RCRD: CPT | Mod: HCNC,S$GLB,, | Performed by: FAMILY MEDICINE

## 2019-12-28 PROCEDURE — 1101F PT FALLS ASSESS-DOCD LE1/YR: CPT | Mod: HCNC,CPTII,S$GLB, | Performed by: FAMILY MEDICINE

## 2019-12-28 PROCEDURE — 99999 PR PBB SHADOW E&M-EST. PATIENT-LVL III: ICD-10-PCS | Mod: PBBFAC,HCNC,, | Performed by: FAMILY MEDICINE

## 2019-12-28 PROCEDURE — 99213 OFFICE O/P EST LOW 20 MIN: CPT | Mod: HCNC,S$GLB,, | Performed by: FAMILY MEDICINE

## 2019-12-28 PROCEDURE — 1126F AMNT PAIN NOTED NONE PRSNT: CPT | Mod: HCNC,S$GLB,, | Performed by: FAMILY MEDICINE

## 2019-12-28 NOTE — PATIENT INSTRUCTIONS
"Continue other medications    For the next 3 days-- use Afrin nasal spray twice a day, and Sudafed twice a day. This helps to release pressure in the eardrum, hopefully preventing any problems    ========         An UPPER RESPIRATORY ILLNESS is initially caused by a virus or allergies 95% of the time. The goal to help you feel better is drying up the drainage & stopping the cough so the virus can run it's course in about 10 days. If your drainage becomes more thick and worse after 7-10 days of trying the below  over the counter medications, please make an appointment for further evaluation    If you have post nasal drip , "runny nose" or sore throat from clearing your throat :  Take an ANTIHISTAMINE  (Claritin or Zyrtec or Allegra ) AT NIGHT    Nasal Saline: Tilt head back and squirt into nostril 2-3 times until you taste saline in back of throat. Spit, and blow nose. Do this 4 times, alternating right and left nostril.   Do this routine 2-3 times per day.     Nasacort Nasal spray (steroid spray over the counter) or Flonase (fluticasone prescription)  Twice daily to decrease swelling & post nasal drip ------ very safe , works where the congestion is , & does not interfere with other medication or go through your blood stream    If you still have drainage or ear popping/ pressure/ decreased hearing, and you do NOT have high blood pressure:  You can add a DECONGESTANT like Sudafed (if it doesn't cause palpitations) or Sudafed PE  In the MORNING.     If you have thick mucus drainage from the nose or coughing up thick phlegm:  You can add a MUCOLYTIC like Mucinex (plain)    If your cough persists:  You can add a COUGH SUPPRESSANT like Delsym (dextromethorphan) twice a day    If you have pain, sore throat, fever or chills:  You can alternate 250 mg of  Tylenol with 200mg of   ibuprofen every 3 hours - for the next 3 days  Discontinue and call me if  you have stomach upset    For SORE THROAT , try: Gargle with warm salt " "water 2-3 times per day.     Drink lots of WATER until your urine is clear because all of the above medications can "dry you out" and cause constipation.     Come & see me  if you are not better in 7-10 days or if your symptoms worsen.    "

## 2019-12-28 NOTE — PROGRESS NOTES
Subjective:      Patient ID: Lucita Saravia is a 69 y.o. female.    Chief Complaint: No chief complaint on file.    [facility] with persistent right year discomfort, decreased hearing, fatigue, only recently began with mild postnasal drip, slight cough.  She has been using Allegra D, Flonase, aspirin, saline.  She does not feel that Bactrim help and would like to be checked to see if she might need a different antibiotic.    She was treated by another physician on December 12 for otitis media with Flonase and Bactrim    She has seen several ENTS in the past, but she does not want to get steroid shots or pills , which they usually treat her with       Current Outpatient Medications:     azelastine (ASTELIN) 137 mcg (0.1 %) nasal spray, 1 spray (137 mcg total) by Nasal route 2 (two) times daily., Disp: 30 mL, Rfl: 3    b complex vitamins capsule, Take 1 capsule by mouth once daily., Disp: , Rfl:     CYANOCOBALAMIN, VITAMIN B-12, (VITAMIN B-12 SL), Place under the tongue., Disp: , Rfl:     ERGOCALCIFEROL, VITAMIN D2, (VITAMIN D ORAL), Take 400 mg by mouth., Disp: , Rfl:     fish oil-omega-3 fatty acids 300-1,000 mg capsule, Take 2 g by mouth once daily., Disp: , Rfl:     fluticasone propionate (FLONASE) 50 mcg/actuation nasal spray, 1 spray (50 mcg total) by Each Nostril route once daily., Disp: 16 g, Rfl: 2    multivitamin capsule, Take 1 capsule by mouth once daily., Disp: , Rfl:     neomycin-polymyxin-hydrocortisone (CORTISPORIN) otic solution, , Disp: , Rfl: 1    sennosides 17.2 mg Tab, , Disp: , Rfl:     SYNTHROID 100 mcg tablet, TAKE 1 TABLET EVERY MORNING, Disp: 90 tablet, Rfl: 2    triamcinolone (NASACORT) 55 mcg nasal inhaler, 2 sprays by Nasal route once daily. (Patient taking differently: 2 sprays by Nasal route daily as needed. ), Disp: 16 g, Rfl: 3    VIT C/VIT E/LUTEIN/MIN/OMEGA-3 (OCUVITE ORAL), Take by mouth., Disp: , Rfl:         Past Medical History:   Diagnosis  "Date    Female infertility NEC     History of degenerative disc disease     neck    Hypothyroidism      Past Surgical History:   Procedure Laterality Date    APPENDECTOMY      EYE SURGERY      LASIK    LASIK      PELVIC LAPAROSCOPY      fertillity    TONSILLECTOMY, ADENOIDECTOMY      TOTAL ABDOMINAL HYSTERECTOMY      endometrisosis     Social History     Social History Narrative    Single, lives alone, retired -but works PT serving at [employer]     Family History   Problem Relation Age of Onset    Alzheimer's disease Mother     Stroke Mother     COPD Father     Cancer Sister         breast cancer    Alzheimer's disease Sister     Kidney nephrosis Daughter     Cancer Paternal Grandmother         rectal cancer     Vitals:    12/28/19 1017   BP: 130/80   Pulse: 72   SpO2: 97%   Weight: 79 kg (174 lb 2.6 oz)   Height: 5' 6" (1.676 m)   PainSc: 0-No pain     Objective:   Physical Exam   Constitutional: She appears well-developed and well-nourished. No distress.   HENT:   Right Ear: External ear normal. Tympanic membrane is retracted.   Left Ear: External ear normal. Tympanic membrane is retracted.   Nose: Mucosal edema and rhinorrhea present. Right sinus exhibits no maxillary sinus tenderness and no frontal sinus tenderness. Left sinus exhibits no maxillary sinus tenderness and no frontal sinus tenderness.   Mouth/Throat: Mucous membranes are normal. Posterior oropharyngeal erythema present. No oropharyngeal exudate.   Infection is cleared   Eyes: Pupils are equal, round, and reactive to light. EOM are normal.   Neck: Normal range of motion. Neck supple. No thyromegaly present.   Cardiovascular: Normal rate, regular rhythm and normal heart sounds.   No murmur heard.  Pulmonary/Chest: Effort normal and breath sounds normal. She has no wheezes. She has no rales.   Lymphadenopathy:     She has no cervical adenopathy.   Skin: Skin is warm and dry.   Nursing note and vitals reviewed.    Assessment:     1. ETD " "(Eustachian tube dysfunction), bilateral    2. Fatigue, unspecified type      Plan:        No need for antibiotics, she does not want steroids    Patient Instructions   Continue other medications    For the next 3 days-- use Afrin nasal spray twice a day, and Sudafed twice a day. This helps to release pressure in the eardrum, hopefully preventing any problems    ========         An UPPER RESPIRATORY ILLNESS is initially caused by a virus or allergies 95% of the time. The goal to help you feel better is drying up the drainage & stopping the cough so the virus can run it's course in about 10 days. If your drainage becomes more thick and worse after 7-10 days of trying the below  over the counter medications, please make an appointment for further evaluation    If you have post nasal drip , "runny nose" or sore throat from clearing your throat :  Take an ANTIHISTAMINE  (Claritin or Zyrtec or Allegra ) AT NIGHT    Nasal Saline: Tilt head back and squirt into nostril 2-3 times until you taste saline in back of throat. Spit, and blow nose. Do this 4 times, alternating right and left nostril.   Do this routine 2-3 times per day.     Nasacort Nasal spray (steroid spray over the counter) or Flonase (fluticasone prescription)  Twice daily to decrease swelling & post nasal drip ------ very safe , works where the congestion is , & does not interfere with other medication or go through your blood stream    If you still have drainage or ear popping/ pressure/ decreased hearing, and you do NOT have high blood pressure:  You can add a DECONGESTANT like Sudafed (if it doesn't cause palpitations) or Sudafed PE  In the MORNING.     If you have thick mucus drainage from the nose or coughing up thick phlegm:  You can add a MUCOLYTIC like Mucinex (plain)    If your cough persists:  You can add a COUGH SUPPRESSANT like Delsym (dextromethorphan) twice a day    If you have pain, sore throat, fever or chills:  You can alternate 250 mg of  " "Tylenol with 200mg of   ibuprofen every 3 hours - for the next 3 days  Discontinue and call me if  you have stomach upset    For SORE THROAT , try: Gargle with warm salt water 2-3 times per day.     Drink lots of WATER until your urine is clear because all of the above medications can "dry you out" and cause constipation.     Come & see me  if you are not better in 7-10 days or if your symptoms worsen.                                "

## 2020-03-05 ENCOUNTER — TELEPHONE (OUTPATIENT)
Dept: INTERNAL MEDICINE | Facility: CLINIC | Age: 70
End: 2020-03-05

## 2020-03-05 NOTE — TELEPHONE ENCOUNTER
Spoke to pt and she is having lots of chest congestion, a cough, and sinus issues. She is worried about it being in her chest. I told her she could go to [facility] tonight and see if they could help until she comes in for her apt. Our clinic didn't have anything available that would work for her.

## 2020-03-05 NOTE — TELEPHONE ENCOUNTER
----- Message from Jeffersonyuliana Sukhi sent at 3/5/2020  4:41 PM CST -----  Contact: self   Would like to get medical advice.  Symptoms (please be specific):  Cough starting to drain in her chest. congestion  How long has patient had these symptoms:  Patient state this is on going for months  Pharmacy name and phone #:  [facility] ([place]) - [place] - [address] [place] [address] [phone] (Phone)  [phone] (Fax)  Any drug allergies (copy from chart):      Would the patient rather a call back or a response via [facility]?:  Call back  Comments:  Patient is asking for an antibiotic. Please advise.

## 2020-03-11 ENCOUNTER — OFFICE VISIT (OUTPATIENT)
Dept: INTERNAL MEDICINE | Facility: CLINIC | Age: 70
End: 2020-03-11
Payer: MEDICARE

## 2020-03-11 ENCOUNTER — TELEPHONE (OUTPATIENT)
Dept: INTERNAL MEDICINE | Facility: CLINIC | Age: 70
End: 2020-03-11

## 2020-03-11 VITALS
RESPIRATION RATE: 18 BRPM | SYSTOLIC BLOOD PRESSURE: 130 MMHG | HEART RATE: 60 BPM | TEMPERATURE: 99 F | HEIGHT: 66 IN | BODY MASS INDEX: 27.6 KG/M2 | WEIGHT: 171.75 LBS | DIASTOLIC BLOOD PRESSURE: 82 MMHG

## 2020-03-11 DIAGNOSIS — H69.90 DYSFUNCTION OF EUSTACHIAN TUBE, UNSPECIFIED LATERALITY: Primary | ICD-10-CM

## 2020-03-11 PROCEDURE — 1101F PT FALLS ASSESS-DOCD LE1/YR: CPT | Mod: HCNC,CPTII,S$GLB, | Performed by: INTERNAL MEDICINE

## 2020-03-11 PROCEDURE — 99999 PR PBB SHADOW E&M-EST. PATIENT-LVL III: CPT | Mod: PBBFAC,HCNC,, | Performed by: INTERNAL MEDICINE

## 2020-03-11 PROCEDURE — 1159F PR MEDICATION LIST DOCUMENTED IN MEDICAL RECORD: ICD-10-PCS | Mod: HCNC,S$GLB,, | Performed by: INTERNAL MEDICINE

## 2020-03-11 PROCEDURE — 1125F PR PAIN SEVERITY QUANTIFIED, PAIN PRESENT: ICD-10-PCS | Mod: HCNC,S$GLB,, | Performed by: INTERNAL MEDICINE

## 2020-03-11 PROCEDURE — 99999 PR PBB SHADOW E&M-EST. PATIENT-LVL III: ICD-10-PCS | Mod: PBBFAC,HCNC,, | Performed by: INTERNAL MEDICINE

## 2020-03-11 PROCEDURE — 1101F PR PT FALLS ASSESS DOC 0-1 FALLS W/OUT INJ PAST YR: ICD-10-PCS | Mod: HCNC,CPTII,S$GLB, | Performed by: INTERNAL MEDICINE

## 2020-03-11 PROCEDURE — 99213 PR OFFICE/OUTPT VISIT, EST, LEVL III, 20-29 MIN: ICD-10-PCS | Mod: HCNC,S$GLB,, | Performed by: INTERNAL MEDICINE

## 2020-03-11 PROCEDURE — 1159F MED LIST DOCD IN RCRD: CPT | Mod: HCNC,S$GLB,, | Performed by: INTERNAL MEDICINE

## 2020-03-11 PROCEDURE — 99213 OFFICE O/P EST LOW 20 MIN: CPT | Mod: HCNC,S$GLB,, | Performed by: INTERNAL MEDICINE

## 2020-03-11 PROCEDURE — 1125F AMNT PAIN NOTED PAIN PRSNT: CPT | Mod: HCNC,S$GLB,, | Performed by: INTERNAL MEDICINE

## 2020-03-11 NOTE — PATIENT INSTRUCTIONS

## 2020-03-11 NOTE — TELEPHONE ENCOUNTER
Spoke to Marika at pharmacy and told her that pt could use sensimist otc. She is going to let pt know

## 2020-03-11 NOTE — PROGRESS NOTES
"Subjective:       Patient ID: Lucita Saravia is a 70 y.o. female.    Chief Complaint: Sinus Problem (ear pain)    HPI     She presents for evaluation of right ear pain. She has had intermittent symptoms for a while.   She saw her ENT Dr. Ceballos who recommended prednisone and antihistamine.   She is not currently using any NSS or antihistamine. She is using saline spray.     Review of Systems   Constitutional: Negative for activity change and fever.   HENT: Positive for congestion, ear pain and postnasal drip.    Eyes: Negative for discharge.   Respiratory: Negative for cough, chest tightness and shortness of breath.    Cardiovascular: Negative for chest pain.   Musculoskeletal: Negative for arthralgias and myalgias.   Allergic/Immunologic: Positive for environmental allergies.   Neurological: Negative for weakness.       Objective:        Vitals:    03/11/20 1113   BP: 130/82   Pulse: 60   Resp: 18   Temp: 98.5 °F (36.9 °C)   TempSrc: Oral   Weight: 77.9 kg (171 lb 11.8 oz)   Height: 5' 6" (1.676 m)       Body mass index is 27.72 kg/m².    Physical Exam   Constitutional: She appears well-developed and well-nourished.   HENT:   Head: Normocephalic and atraumatic.   Right Ear: External ear normal. No drainage or tenderness. Tympanic membrane is erythematous and bulging. No middle ear effusion.   Left Ear: External ear normal. No drainage or tenderness. Tympanic membrane is erythematous and bulging.  No middle ear effusion.   Mouth/Throat: Oropharyngeal exudate (post nasal drainage) present. No posterior oropharyngeal erythema.   Eyes: Conjunctivae and EOM are normal.   Cardiovascular: Normal rate, regular rhythm, normal heart sounds and intact distal pulses.   Pulmonary/Chest: Effort normal and breath sounds normal. No respiratory distress. She has no wheezes.   Lymphadenopathy:     She has no cervical adenopathy.   Skin: Skin is warm and dry.   Psychiatric: She has a normal mood and affect.       Assessment:     1. " Dysfunction of Eustachian tube, unspecified laterality           Plan:         1. Dysfunction of Eustachian tube, unspecified laterality  - trial sensimist spray so less irritating. Start antihistamine, can add decongestant. Continue saline spray. F/u with ENT if symptoms continue to recur.   - fluticasone (VERAMYST) 27.5 mcg/actuation nasal spray; 2 sprays by Nasal route once daily.  Dispense: 15.8 mL; Refill: 1           Patient note was created using MModal Dictation.  Any errors in syntax or even information may not have been identified and edited on initial review prior to signing this note.

## 2020-03-11 NOTE — TELEPHONE ENCOUNTER
----- Message from Avril Roach sent at 3/11/2020  2:00 PM CDT -----  Contact: Marika hoffmann/ [facility]   Marika states that fluticasone (VERAMYST) 27.5 mcg/actuation nasal spray is no longer available. Needs a switch or something over counter. Please call and advise.

## 2020-08-31 ENCOUNTER — PES CALL (OUTPATIENT)
Dept: ADMINISTRATIVE | Facility: CLINIC | Age: 70
End: 2020-08-31

## 2020-09-28 ENCOUNTER — OFFICE VISIT (OUTPATIENT)
Dept: INTERNAL MEDICINE | Facility: CLINIC | Age: 70
End: 2020-09-28
Payer: MEDICARE

## 2020-09-28 VITALS
OXYGEN SATURATION: 95 % | HEIGHT: 66 IN | DIASTOLIC BLOOD PRESSURE: 74 MMHG | RESPIRATION RATE: 18 BRPM | WEIGHT: 184.06 LBS | SYSTOLIC BLOOD PRESSURE: 138 MMHG | HEART RATE: 73 BPM | BODY MASS INDEX: 29.58 KG/M2 | TEMPERATURE: 98 F

## 2020-09-28 DIAGNOSIS — H69.93 ETD (EUSTACHIAN TUBE DYSFUNCTION), BILATERAL: ICD-10-CM

## 2020-09-28 DIAGNOSIS — H66.93 BILATERAL OTITIS MEDIA, UNSPECIFIED OTITIS MEDIA TYPE: Primary | ICD-10-CM

## 2020-09-28 PROCEDURE — 1101F PR PT FALLS ASSESS DOC 0-1 FALLS W/OUT INJ PAST YR: ICD-10-PCS | Mod: HCNC,CPTII,S$GLB, | Performed by: INTERNAL MEDICINE

## 2020-09-28 PROCEDURE — 99999 PR PBB SHADOW E&M-EST. PATIENT-LVL IV: ICD-10-PCS | Mod: PBBFAC,HCNC,, | Performed by: INTERNAL MEDICINE

## 2020-09-28 PROCEDURE — 99999 PR PBB SHADOW E&M-EST. PATIENT-LVL IV: CPT | Mod: PBBFAC,HCNC,, | Performed by: INTERNAL MEDICINE

## 2020-09-28 PROCEDURE — 3008F PR BODY MASS INDEX (BMI) DOCUMENTED: ICD-10-PCS | Mod: HCNC,CPTII,S$GLB, | Performed by: INTERNAL MEDICINE

## 2020-09-28 PROCEDURE — 3008F BODY MASS INDEX DOCD: CPT | Mod: HCNC,CPTII,S$GLB, | Performed by: INTERNAL MEDICINE

## 2020-09-28 PROCEDURE — 1126F PR PAIN SEVERITY QUANTIFIED, NO PAIN PRESENT: ICD-10-PCS | Mod: HCNC,S$GLB,, | Performed by: INTERNAL MEDICINE

## 2020-09-28 PROCEDURE — 1101F PT FALLS ASSESS-DOCD LE1/YR: CPT | Mod: HCNC,CPTII,S$GLB, | Performed by: INTERNAL MEDICINE

## 2020-09-28 PROCEDURE — 1126F AMNT PAIN NOTED NONE PRSNT: CPT | Mod: HCNC,S$GLB,, | Performed by: INTERNAL MEDICINE

## 2020-09-28 PROCEDURE — 99212 OFFICE O/P EST SF 10 MIN: CPT | Mod: HCNC,S$GLB,, | Performed by: INTERNAL MEDICINE

## 2020-09-28 PROCEDURE — 1159F PR MEDICATION LIST DOCUMENTED IN MEDICAL RECORD: ICD-10-PCS | Mod: HCNC,S$GLB,, | Performed by: INTERNAL MEDICINE

## 2020-09-28 PROCEDURE — 99212 PR OFFICE/OUTPT VISIT, EST, LEVL II, 10-19 MIN: ICD-10-PCS | Mod: HCNC,S$GLB,, | Performed by: INTERNAL MEDICINE

## 2020-09-28 PROCEDURE — 1159F MED LIST DOCD IN RCRD: CPT | Mod: HCNC,S$GLB,, | Performed by: INTERNAL MEDICINE

## 2020-09-28 RX ORDER — AMOXICILLIN AND CLAVULANATE POTASSIUM 875; 125 MG/1; MG/1
1 TABLET, FILM COATED ORAL EVERY 12 HOURS
Qty: 20 TABLET | Refills: 0 | Status: SHIPPED | OUTPATIENT
Start: 2020-09-28 | End: 2020-11-05 | Stop reason: ALTCHOICE

## 2020-09-28 RX ORDER — PREDNISONE 20 MG/1
TABLET ORAL
COMMUNITY
Start: 2020-09-26 | End: 2020-11-05

## 2020-09-28 NOTE — PROGRESS NOTES
Subjective:       Patient ID: Lucita Saravia is a 70 y.o. female.    Chief Complaint: Ear Fullness    HPI   The patient has been experiencing bilateral ear congestion for several days.  She denies having any sinus congestion.  She is using Nasacort spray daily.  No ear discharge is present.  She denies having any vertigo or tinnitus.    Review of Systems   Constitutional: Negative for chills and fever.   HENT: Positive for ear pain. Negative for ear discharge, postnasal drip, rhinorrhea, sinus pressure/congestion, sore throat and tinnitus.    Respiratory: Negative for cough.    Cardiovascular: Negative for chest pain.   Neurological: Negative for dizziness, vertigo, disturbances in coordination and coordination difficulties.         Objective:      Physical Exam  Vitals signs and nursing note reviewed.   Constitutional:       General: She is not in acute distress.     Appearance: Normal appearance. She is not ill-appearing.   HENT:      Head: Normocephalic and atraumatic.      Comments: Sinuses are nontender to palpation.     Ears:      Comments: Both tympanic membranes were dull and red.  Air-fluid level is noted right side.  External canals were clear.     Mouth/Throat:      Pharynx: Oropharynx is clear. No oropharyngeal exudate.   Eyes:      Extraocular Movements: Extraocular movements intact.      Conjunctiva/sclera: Conjunctivae normal.   Neck:      Musculoskeletal: Normal range of motion and neck supple. No muscular tenderness.   Lymphadenopathy:      Cervical: No cervical adenopathy.   Neurological:      General: No focal deficit present.      Mental Status: She is alert and oriented to person, place, and time.   Psychiatric:         Behavior: Behavior normal.         Assessment:       1. Bilateral otitis media, unspecified otitis media type    2. ETD (Eustachian tube dysfunction), bilateral        Plan:       Lucita was seen today for ear fullness.   Augmentin will be prescribed.  The patient may use an  OTC decongestant such as pseudoephedrine.  She should continue daily use of Nasacort.  The patient is to return to clinic as needed.    Diagnoses and all orders for this visit:    Bilateral otitis media, unspecified otitis media type    ETD (Eustachian tube dysfunction), bilateral    Other orders  -     amoxicillin-clavulanate 875-125mg (AUGMENTIN) 875-125 mg per tablet; Take 1 tablet by mouth every 12 (twelve) hours. For infection.

## 2020-10-08 ENCOUNTER — PES CALL (OUTPATIENT)
Dept: ADMINISTRATIVE | Facility: CLINIC | Age: 70
End: 2020-10-08

## 2020-10-10 DIAGNOSIS — E03.4 HYPOTHYROIDISM DUE TO ACQUIRED ATROPHY OF THYROID: ICD-10-CM

## 2020-10-11 RX ORDER — LEVOTHYROXINE SODIUM 100 UG/1
TABLET ORAL
Qty: 90 TABLET | Refills: 0 | Status: SHIPPED | OUTPATIENT
Start: 2020-10-11 | End: 2021-02-04 | Stop reason: SDUPTHER

## 2020-11-05 ENCOUNTER — OFFICE VISIT (OUTPATIENT)
Dept: INTERNAL MEDICINE | Facility: CLINIC | Age: 70
End: 2020-11-05
Payer: MEDICARE

## 2020-11-05 VITALS
RESPIRATION RATE: 16 BRPM | HEIGHT: 66 IN | TEMPERATURE: 98 F | OXYGEN SATURATION: 98 % | DIASTOLIC BLOOD PRESSURE: 86 MMHG | WEIGHT: 186.06 LBS | SYSTOLIC BLOOD PRESSURE: 126 MMHG | HEART RATE: 71 BPM | BODY MASS INDEX: 29.9 KG/M2

## 2020-11-05 DIAGNOSIS — H69.91 EUSTACHIAN TUBE DYSFUNCTION, RIGHT: Primary | ICD-10-CM

## 2020-11-05 PROCEDURE — 99999 PR PBB SHADOW E&M-EST. PATIENT-LVL IV: CPT | Mod: PBBFAC,HCNC,, | Performed by: INTERNAL MEDICINE

## 2020-11-05 PROCEDURE — 1159F PR MEDICATION LIST DOCUMENTED IN MEDICAL RECORD: ICD-10-PCS | Mod: HCNC,S$GLB,, | Performed by: INTERNAL MEDICINE

## 2020-11-05 PROCEDURE — 99999 PR PBB SHADOW E&M-EST. PATIENT-LVL IV: ICD-10-PCS | Mod: PBBFAC,HCNC,, | Performed by: INTERNAL MEDICINE

## 2020-11-05 PROCEDURE — 96372 THER/PROPH/DIAG INJ SC/IM: CPT | Mod: HCNC,S$GLB,, | Performed by: INTERNAL MEDICINE

## 2020-11-05 PROCEDURE — 99213 PR OFFICE/OUTPT VISIT, EST, LEVL III, 20-29 MIN: ICD-10-PCS | Mod: HCNC,25,S$GLB, | Performed by: INTERNAL MEDICINE

## 2020-11-05 PROCEDURE — 99213 OFFICE O/P EST LOW 20 MIN: CPT | Mod: HCNC,25,S$GLB, | Performed by: INTERNAL MEDICINE

## 2020-11-05 PROCEDURE — 1101F PR PT FALLS ASSESS DOC 0-1 FALLS W/OUT INJ PAST YR: ICD-10-PCS | Mod: HCNC,CPTII,S$GLB, | Performed by: INTERNAL MEDICINE

## 2020-11-05 PROCEDURE — 1125F AMNT PAIN NOTED PAIN PRSNT: CPT | Mod: HCNC,S$GLB,, | Performed by: INTERNAL MEDICINE

## 2020-11-05 PROCEDURE — 1159F MED LIST DOCD IN RCRD: CPT | Mod: HCNC,S$GLB,, | Performed by: INTERNAL MEDICINE

## 2020-11-05 PROCEDURE — 96372 PR INJECTION,THERAP/PROPH/DIAG2ST, IM OR SUBCUT: ICD-10-PCS | Mod: HCNC,S$GLB,, | Performed by: INTERNAL MEDICINE

## 2020-11-05 PROCEDURE — 1125F PR PAIN SEVERITY QUANTIFIED, PAIN PRESENT: ICD-10-PCS | Mod: HCNC,S$GLB,, | Performed by: INTERNAL MEDICINE

## 2020-11-05 PROCEDURE — 1101F PT FALLS ASSESS-DOCD LE1/YR: CPT | Mod: HCNC,CPTII,S$GLB, | Performed by: INTERNAL MEDICINE

## 2020-11-05 PROCEDURE — 3008F BODY MASS INDEX DOCD: CPT | Mod: HCNC,CPTII,S$GLB, | Performed by: INTERNAL MEDICINE

## 2020-11-05 PROCEDURE — 3008F PR BODY MASS INDEX (BMI) DOCUMENTED: ICD-10-PCS | Mod: HCNC,CPTII,S$GLB, | Performed by: INTERNAL MEDICINE

## 2020-11-05 RX ORDER — LEVOCETIRIZINE DIHYDROCHLORIDE 5 MG/1
5 TABLET, FILM COATED ORAL NIGHTLY
Qty: 30 TABLET | Refills: 11 | Status: SHIPPED | OUTPATIENT
Start: 2020-11-05 | End: 2021-04-12 | Stop reason: SDUPTHER

## 2020-11-05 RX ORDER — TRIAMCINOLONE ACETONIDE 40 MG/ML
40 INJECTION, SUSPENSION INTRA-ARTICULAR; INTRAMUSCULAR
Status: COMPLETED | OUTPATIENT
Start: 2020-11-05 | End: 2020-11-05

## 2020-11-05 RX ORDER — FLUTICASONE PROPIONATE 50 MCG
2 SPRAY, SUSPENSION (ML) NASAL DAILY
Qty: 16 G | Refills: 12 | Status: SHIPPED | OUTPATIENT
Start: 2020-11-05 | End: 2020-11-23 | Stop reason: SDUPTHER

## 2020-11-05 RX ADMIN — TRIAMCINOLONE ACETONIDE 40 MG: 40 INJECTION, SUSPENSION INTRA-ARTICULAR; INTRAMUSCULAR at 03:11

## 2020-11-05 NOTE — PROGRESS NOTES
Subjective:       Patient ID: Lucita Saravia is a 70 y.o. female.    Chief Complaint: Otalgia (right)    HPI   Pt here for evaluation of a few days of intermittent symptoms of right sided ear pressure/pain. She denies any discharge, fevers/chills or sinus symptoms. Some relief with NSAIDs.   Review of Systems   Constitutional: Negative for activity change, appetite change, chills, diaphoresis, fatigue, fever and unexpected weight change.   HENT: Positive for ear pain. Negative for ear discharge, postnasal drip, rhinorrhea, sinus pressure/congestion, sneezing, sore throat, trouble swallowing and voice change.    Respiratory: Negative for cough, shortness of breath and wheezing.    Cardiovascular: Negative for chest pain, palpitations and leg swelling.   Gastrointestinal: Negative for abdominal pain, blood in stool, constipation, diarrhea, nausea and vomiting.   Genitourinary: Negative for dysuria.   Musculoskeletal: Negative for arthralgias and myalgias.   Integumentary:  Negative for rash and wound.   Allergic/Immunologic: Negative for environmental allergies and food allergies.   Hematological: Negative for adenopathy. Does not bruise/bleed easily.         Objective:      Physical Exam  Constitutional:       General: She is not in acute distress.     Appearance: She is well-developed. She is not diaphoretic.   HENT:      Head: Normocephalic and atraumatic.      Right Ear: External ear normal. No drainage, swelling or tenderness. No middle ear effusion. Tympanic membrane is bulging (slightly).      Left Ear: External ear normal.      Nose: Nose normal.      Mouth/Throat:      Pharynx: No oropharyngeal exudate.   Eyes:      General: No scleral icterus.        Right eye: No discharge.         Left eye: No discharge.      Conjunctiva/sclera: Conjunctivae normal.      Pupils: Pupils are equal, round, and reactive to light.   Neck:      Musculoskeletal: Neck supple.      Vascular: No JVD.   Cardiovascular:      Rate and  Rhythm: Normal rate and regular rhythm.      Heart sounds: Normal heart sounds.   Pulmonary:      Effort: Pulmonary effort is normal. No respiratory distress.      Breath sounds: Normal breath sounds. No wheezing or rales.   Lymphadenopathy:      Cervical: No cervical adenopathy.   Skin:     General: Skin is warm and dry.      Coloration: Skin is not pale.      Findings: No rash.   Neurological:      Mental Status: She is alert and oriented to person, place, and time.         Assessment:       1. Eustachian tube dysfunction, right        Plan:    1. Rx Xyzal/Flonase qHS, Kenalog 40 mg IM

## 2020-11-23 ENCOUNTER — TELEPHONE (OUTPATIENT)
Dept: INTERNAL MEDICINE | Facility: CLINIC | Age: 70
End: 2020-11-23

## 2020-11-23 ENCOUNTER — OFFICE VISIT (OUTPATIENT)
Dept: INTERNAL MEDICINE | Facility: CLINIC | Age: 70
End: 2020-11-23
Payer: MEDICARE

## 2020-11-23 VITALS
DIASTOLIC BLOOD PRESSURE: 82 MMHG | TEMPERATURE: 97 F | RESPIRATION RATE: 18 BRPM | HEART RATE: 95 BPM | SYSTOLIC BLOOD PRESSURE: 130 MMHG | WEIGHT: 183 LBS | HEIGHT: 66 IN | OXYGEN SATURATION: 95 % | BODY MASS INDEX: 29.41 KG/M2

## 2020-11-23 DIAGNOSIS — H69.91 EUSTACHIAN TUBE DYSFUNCTION, RIGHT: ICD-10-CM

## 2020-11-23 DIAGNOSIS — Z23 NEED FOR 23-POLYVALENT PNEUMOCOCCAL POLYSACCHARIDE VACCINE: ICD-10-CM

## 2020-11-23 DIAGNOSIS — H60.90 OTITIS EXTERNA, UNSPECIFIED CHRONICITY, UNSPECIFIED LATERALITY, UNSPECIFIED TYPE: Primary | ICD-10-CM

## 2020-11-23 PROCEDURE — 1159F PR MEDICATION LIST DOCUMENTED IN MEDICAL RECORD: ICD-10-PCS | Mod: HCNC,S$GLB,, | Performed by: INTERNAL MEDICINE

## 2020-11-23 PROCEDURE — 3288F PR FALLS RISK ASSESSMENT DOCUMENTED: ICD-10-PCS | Mod: HCNC,CPTII,S$GLB, | Performed by: INTERNAL MEDICINE

## 2020-11-23 PROCEDURE — 3288F FALL RISK ASSESSMENT DOCD: CPT | Mod: HCNC,CPTII,S$GLB, | Performed by: INTERNAL MEDICINE

## 2020-11-23 PROCEDURE — 1101F PT FALLS ASSESS-DOCD LE1/YR: CPT | Mod: HCNC,CPTII,S$GLB, | Performed by: INTERNAL MEDICINE

## 2020-11-23 PROCEDURE — 99999 PR PBB SHADOW E&M-EST. PATIENT-LVL IV: ICD-10-PCS | Mod: PBBFAC,HCNC,, | Performed by: INTERNAL MEDICINE

## 2020-11-23 PROCEDURE — 3008F BODY MASS INDEX DOCD: CPT | Mod: HCNC,CPTII,S$GLB, | Performed by: INTERNAL MEDICINE

## 2020-11-23 PROCEDURE — G0009 PNEUMOCOCCAL POLYSACCHARIDE VACCINE 23-VALENT =>2YO SQ IM: ICD-10-PCS | Mod: HCNC,S$GLB,, | Performed by: INTERNAL MEDICINE

## 2020-11-23 PROCEDURE — 1159F MED LIST DOCD IN RCRD: CPT | Mod: HCNC,S$GLB,, | Performed by: INTERNAL MEDICINE

## 2020-11-23 PROCEDURE — 1101F PR PT FALLS ASSESS DOC 0-1 FALLS W/OUT INJ PAST YR: ICD-10-PCS | Mod: HCNC,CPTII,S$GLB, | Performed by: INTERNAL MEDICINE

## 2020-11-23 PROCEDURE — 99214 OFFICE O/P EST MOD 30 MIN: CPT | Mod: HCNC,25,S$GLB, | Performed by: INTERNAL MEDICINE

## 2020-11-23 PROCEDURE — 90732 PPSV23 VACC 2 YRS+ SUBQ/IM: CPT | Mod: HCNC,S$GLB,, | Performed by: INTERNAL MEDICINE

## 2020-11-23 PROCEDURE — 99214 PR OFFICE/OUTPT VISIT, EST, LEVL IV, 30-39 MIN: ICD-10-PCS | Mod: HCNC,25,S$GLB, | Performed by: INTERNAL MEDICINE

## 2020-11-23 PROCEDURE — G0009 ADMIN PNEUMOCOCCAL VACCINE: HCPCS | Mod: HCNC,S$GLB,, | Performed by: INTERNAL MEDICINE

## 2020-11-23 PROCEDURE — 90732 PNEUMOCOCCAL POLYSACCHARIDE VACCINE 23-VALENT =>2YO SQ IM: ICD-10-PCS | Mod: HCNC,S$GLB,, | Performed by: INTERNAL MEDICINE

## 2020-11-23 PROCEDURE — 99999 PR PBB SHADOW E&M-EST. PATIENT-LVL IV: CPT | Mod: PBBFAC,HCNC,, | Performed by: INTERNAL MEDICINE

## 2020-11-23 PROCEDURE — 3008F PR BODY MASS INDEX (BMI) DOCUMENTED: ICD-10-PCS | Mod: HCNC,CPTII,S$GLB, | Performed by: INTERNAL MEDICINE

## 2020-11-23 RX ORDER — FLUTICASONE PROPIONATE 50 MCG
2 SPRAY, SUSPENSION (ML) NASAL DAILY
Qty: 16 G | Refills: 12 | Status: SHIPPED | OUTPATIENT
Start: 2020-11-23 | End: 2020-12-23

## 2020-11-23 RX ORDER — CIPROFLOXACIN AND DEXAMETHASONE 3; 1 MG/ML; MG/ML
4 SUSPENSION/ DROPS AURICULAR (OTIC) 2 TIMES DAILY
Qty: 7.5 ML | Refills: 0 | Status: SHIPPED | OUTPATIENT
Start: 2020-11-23 | End: 2020-11-23 | Stop reason: ALTCHOICE

## 2020-11-23 RX ORDER — NEOMYCIN SULFATE, POLYMYXIN B SULFATE AND HYDROCORTISONE 10; 3.5; 1 MG/ML; MG/ML; [USP'U]/ML
3 SUSPENSION/ DROPS AURICULAR (OTIC) 4 TIMES DAILY
Qty: 10 ML | Refills: 0 | Status: SHIPPED | OUTPATIENT
Start: 2020-11-23 | End: 2023-05-04 | Stop reason: SDUPTHER

## 2020-11-23 NOTE — TELEPHONE ENCOUNTER
----- Message from Kely Shaffer sent at 11/23/2020  3:22 PM CST -----  Contact: self [phone]  Pt is calling to let Dr Mcintosh know the second Rx she called in only cost $3.60

## 2020-11-23 NOTE — PROGRESS NOTES
"Subjective:       Patient ID: Lucita Saravia is a 70 y.o. female.    Chief Complaint: right ear pain    HPI  Right ear pain started one week to a few days ago. Using saline spray. OTC 24 hr oral antihistamine. She is not using nasal steroid spray. No fevers. No URI symptoms.     Review of Systems   Constitutional: Negative for chills and fever.   HENT: Positive for ear pain. Negative for ear discharge, postnasal drip and sinus pain.    Respiratory: Negative for shortness of breath.    Cardiovascular: Negative for chest pain.   Gastrointestinal: Negative for abdominal pain.   Allergic/Immunologic: Positive for environmental allergies.   Neurological: Negative for headaches.       Objective:        Vitals:    11/23/20 1250   BP: 130/82   BP Location: Left arm   Patient Position: Sitting   BP Method: Large (Manual)   Pulse: 95   Resp: 18   Temp: 97.2 °F (36.2 °C)   TempSrc: Other (see comments)   SpO2: 95%   Weight: 83 kg (182 lb 15.7 oz)   Height: 5' 6" (1.676 m)       Body mass index is 29.53 kg/m².    Physical Exam  Constitutional:       General: She is not in acute distress.     Appearance: She is well-developed. She is not diaphoretic.   HENT:      Head: Normocephalic and atraumatic.      Right Ear: No drainage. No middle ear effusion. No mastoid tenderness.      Left Ear: No drainage.  No middle ear effusion. No mastoid tenderness.      Ears:      Comments: Bilateral ear canals erythematous and slightly swollen. Left ear canal w/ small amount old dried blood.  Eyes:      General:         Right eye: No discharge.         Left eye: No discharge.      Conjunctiva/sclera: Conjunctivae normal.   Neck:      Musculoskeletal: Neck supple.   Cardiovascular:      Rate and Rhythm: Normal rate and regular rhythm.      Heart sounds: Normal heart sounds.   Pulmonary:      Effort: Pulmonary effort is normal.      Breath sounds: Normal breath sounds.   Abdominal:      General: There is no distension.   Skin:     General: Skin " is warm and dry.      Findings: No erythema.   Neurological:      General: No focal deficit present.      Mental Status: She is alert.   Psychiatric:         Behavior: Behavior normal.         Thought Content: Thought content normal.         Assessment:     1. Otitis externa, unspecified chronicity, unspecified laterality, unspecified type    2. Eustachian tube dysfunction, right    3. Need for 23-polyvalent pneumococcal polysaccharide vaccine           Plan:         1. Otitis externa, unspecified chronicity, unspecified laterality, unspecified type  - do not put any objects in ear canal  - ciprofloxacin-dexamethasone 0.3-0.1% (CIPRODEX) 0.3-0.1 % DrpS; Place 4 drops into both ears 2 (two) times daily.  Dispense: 7.5 mL; Refill: 0    2. Eustachian tube dysfunction, right  - cont antihistamine. Start NSS.   - fluticasone propionate (FLONASE) 50 mcg/actuation nasal spray; 2 sprays (100 mcg total) by Each Nostril route once daily.  Dispense: 16 g; Refill: 12    3. Need for 23-polyvalent pneumococcal polysaccharide vaccine  - (In Office Administered) Pneumococcal Polysaccharide Vaccine (23 Valent) (SQ/IM)           Patient note was created using MModal Dictation.  Any errors in syntax or even information may not have been identified and edited on initial review prior to signing this note.

## 2020-11-23 NOTE — TELEPHONE ENCOUNTER
----- Message from Lillie Galvez sent at 11/23/2020  2:03 PM CST -----  Contact: [phone]  Pt states that she is ar pharmacy and the medication prescribed is $174 and shed like something else called in

## 2020-11-23 NOTE — TELEPHONE ENCOUNTER
Spoke to pt and told her to try [facility] for the cost of the ear drops but she states she still isn't paying $82 for a Rx    She states that dr ross gave her a steroid shot in the past and it worked and want's to know if this is an option    Please advise

## 2020-11-23 NOTE — TELEPHONE ENCOUNTER
Spoke to pt and let her know that it is too soon for another steroid shot and that dr gaspar called in another ear drop. She asked how much it was going to cost. I told her that she would have to call the pharmacy to find out. We don't have that info   She expressed understanding

## 2020-11-23 NOTE — TELEPHONE ENCOUNTER
----- Message from Neena Dao sent at 11/23/2020  2:28 PM CST -----  Contact: [phone]  Patient states Dr. Coburn gave her an injection before and it worked.Patient would like to request the same injection instead of the expensive Rx. Please call and advise

## 2020-11-23 NOTE — PATIENT INSTRUCTIONS

## 2021-02-04 DIAGNOSIS — E03.4 HYPOTHYROIDISM DUE TO ACQUIRED ATROPHY OF THYROID: ICD-10-CM

## 2021-02-04 RX ORDER — LEVOTHYROXINE SODIUM 100 UG/1
100 TABLET ORAL EVERY MORNING
Qty: 90 TABLET | Refills: 0 | Status: SHIPPED | OUTPATIENT
Start: 2021-02-04 | End: 2021-06-10

## 2021-02-25 ENCOUNTER — OFFICE VISIT (OUTPATIENT)
Dept: INTERNAL MEDICINE | Facility: CLINIC | Age: 71
End: 2021-02-25
Payer: MEDICARE

## 2021-02-25 VITALS
DIASTOLIC BLOOD PRESSURE: 82 MMHG | BODY MASS INDEX: 28.09 KG/M2 | OXYGEN SATURATION: 96 % | HEART RATE: 74 BPM | WEIGHT: 174.81 LBS | TEMPERATURE: 97 F | SYSTOLIC BLOOD PRESSURE: 120 MMHG | HEIGHT: 66 IN

## 2021-02-25 DIAGNOSIS — H69.91 EUSTACHIAN TUBE DYSFUNCTION, RIGHT: Primary | ICD-10-CM

## 2021-02-25 PROCEDURE — 1159F PR MEDICATION LIST DOCUMENTED IN MEDICAL RECORD: ICD-10-PCS | Mod: S$GLB,,, | Performed by: INTERNAL MEDICINE

## 2021-02-25 PROCEDURE — 1126F AMNT PAIN NOTED NONE PRSNT: CPT | Mod: S$GLB,,, | Performed by: INTERNAL MEDICINE

## 2021-02-25 PROCEDURE — 1101F PT FALLS ASSESS-DOCD LE1/YR: CPT | Mod: CPTII,S$GLB,, | Performed by: INTERNAL MEDICINE

## 2021-02-25 PROCEDURE — 1101F PR PT FALLS ASSESS DOC 0-1 FALLS W/OUT INJ PAST YR: ICD-10-PCS | Mod: CPTII,S$GLB,, | Performed by: INTERNAL MEDICINE

## 2021-02-25 PROCEDURE — 1159F MED LIST DOCD IN RCRD: CPT | Mod: S$GLB,,, | Performed by: INTERNAL MEDICINE

## 2021-02-25 PROCEDURE — 99999 PR PBB SHADOW E&M-EST. PATIENT-LVL III: CPT | Mod: PBBFAC,,, | Performed by: INTERNAL MEDICINE

## 2021-02-25 PROCEDURE — 99213 PR OFFICE/OUTPT VISIT, EST, LEVL III, 20-29 MIN: ICD-10-PCS | Mod: 25,S$GLB,, | Performed by: INTERNAL MEDICINE

## 2021-02-25 PROCEDURE — 3008F PR BODY MASS INDEX (BMI) DOCUMENTED: ICD-10-PCS | Mod: CPTII,S$GLB,, | Performed by: INTERNAL MEDICINE

## 2021-02-25 PROCEDURE — 3008F BODY MASS INDEX DOCD: CPT | Mod: CPTII,S$GLB,, | Performed by: INTERNAL MEDICINE

## 2021-02-25 PROCEDURE — 99213 OFFICE O/P EST LOW 20 MIN: CPT | Mod: 25,S$GLB,, | Performed by: INTERNAL MEDICINE

## 2021-02-25 PROCEDURE — 1126F PR PAIN SEVERITY QUANTIFIED, NO PAIN PRESENT: ICD-10-PCS | Mod: S$GLB,,, | Performed by: INTERNAL MEDICINE

## 2021-02-25 PROCEDURE — 3288F PR FALLS RISK ASSESSMENT DOCUMENTED: ICD-10-PCS | Mod: CPTII,S$GLB,, | Performed by: INTERNAL MEDICINE

## 2021-02-25 PROCEDURE — 99999 PR PBB SHADOW E&M-EST. PATIENT-LVL III: ICD-10-PCS | Mod: PBBFAC,,, | Performed by: INTERNAL MEDICINE

## 2021-02-25 PROCEDURE — 96372 PR INJECTION,THERAP/PROPH/DIAG2ST, IM OR SUBCUT: ICD-10-PCS | Mod: S$GLB,,, | Performed by: INTERNAL MEDICINE

## 2021-02-25 PROCEDURE — 96372 THER/PROPH/DIAG INJ SC/IM: CPT | Mod: S$GLB,,, | Performed by: INTERNAL MEDICINE

## 2021-02-25 PROCEDURE — 3288F FALL RISK ASSESSMENT DOCD: CPT | Mod: CPTII,S$GLB,, | Performed by: INTERNAL MEDICINE

## 2021-02-25 RX ORDER — HYDROCODONE BITARTRATE AND ACETAMINOPHEN 7.5; 325 MG/1; MG/1
TABLET ORAL
COMMUNITY
Start: 2021-02-22 | End: 2021-04-12

## 2021-02-25 RX ORDER — TRIAMCINOLONE ACETONIDE 40 MG/ML
40 INJECTION, SUSPENSION INTRA-ARTICULAR; INTRAMUSCULAR
Status: COMPLETED | OUTPATIENT
Start: 2021-02-25 | End: 2021-02-25

## 2021-02-25 RX ADMIN — TRIAMCINOLONE ACETONIDE 40 MG: 40 INJECTION, SUSPENSION INTRA-ARTICULAR; INTRAMUSCULAR at 09:02

## 2021-03-03 ENCOUNTER — IMMUNIZATION (OUTPATIENT)
Dept: PRIMARY CARE CLINIC | Facility: CLINIC | Age: 71
End: 2021-03-03
Payer: MEDICARE

## 2021-03-03 DIAGNOSIS — Z23 NEED FOR VACCINATION: Primary | ICD-10-CM

## 2021-03-03 PROCEDURE — 91300 COVID-19, MRNA, LNP-S, PF, 30 MCG/0.3 ML DOSE VACCINE: CPT | Mod: HCNC,PBBFAC | Performed by: EMERGENCY MEDICINE

## 2021-03-16 ENCOUNTER — OFFICE VISIT (OUTPATIENT)
Dept: INTERNAL MEDICINE | Facility: CLINIC | Age: 71
End: 2021-03-16
Payer: MEDICARE

## 2021-03-16 VITALS
SYSTOLIC BLOOD PRESSURE: 118 MMHG | OXYGEN SATURATION: 100 % | HEIGHT: 66 IN | BODY MASS INDEX: 27.95 KG/M2 | HEART RATE: 80 BPM | TEMPERATURE: 98 F | WEIGHT: 173.94 LBS | DIASTOLIC BLOOD PRESSURE: 80 MMHG

## 2021-03-16 DIAGNOSIS — J30.2 SEASONAL ALLERGIES: Primary | ICD-10-CM

## 2021-03-16 PROCEDURE — 1159F PR MEDICATION LIST DOCUMENTED IN MEDICAL RECORD: ICD-10-PCS | Mod: S$GLB,,, | Performed by: INTERNAL MEDICINE

## 2021-03-16 PROCEDURE — 99213 OFFICE O/P EST LOW 20 MIN: CPT | Mod: S$GLB,,, | Performed by: INTERNAL MEDICINE

## 2021-03-16 PROCEDURE — 3008F PR BODY MASS INDEX (BMI) DOCUMENTED: ICD-10-PCS | Mod: CPTII,S$GLB,, | Performed by: INTERNAL MEDICINE

## 2021-03-16 PROCEDURE — 3288F FALL RISK ASSESSMENT DOCD: CPT | Mod: CPTII,S$GLB,, | Performed by: INTERNAL MEDICINE

## 2021-03-16 PROCEDURE — 99999 PR PBB SHADOW E&M-EST. PATIENT-LVL III: ICD-10-PCS | Mod: PBBFAC,,, | Performed by: INTERNAL MEDICINE

## 2021-03-16 PROCEDURE — 99999 PR PBB SHADOW E&M-EST. PATIENT-LVL III: CPT | Mod: PBBFAC,,, | Performed by: INTERNAL MEDICINE

## 2021-03-16 PROCEDURE — 1126F PR PAIN SEVERITY QUANTIFIED, NO PAIN PRESENT: ICD-10-PCS | Mod: S$GLB,,, | Performed by: INTERNAL MEDICINE

## 2021-03-16 PROCEDURE — 1101F PT FALLS ASSESS-DOCD LE1/YR: CPT | Mod: CPTII,S$GLB,, | Performed by: INTERNAL MEDICINE

## 2021-03-16 PROCEDURE — 1126F AMNT PAIN NOTED NONE PRSNT: CPT | Mod: S$GLB,,, | Performed by: INTERNAL MEDICINE

## 2021-03-16 PROCEDURE — 1159F MED LIST DOCD IN RCRD: CPT | Mod: S$GLB,,, | Performed by: INTERNAL MEDICINE

## 2021-03-16 PROCEDURE — 1101F PR PT FALLS ASSESS DOC 0-1 FALLS W/OUT INJ PAST YR: ICD-10-PCS | Mod: CPTII,S$GLB,, | Performed by: INTERNAL MEDICINE

## 2021-03-16 PROCEDURE — 99213 PR OFFICE/OUTPT VISIT, EST, LEVL III, 20-29 MIN: ICD-10-PCS | Mod: S$GLB,,, | Performed by: INTERNAL MEDICINE

## 2021-03-16 PROCEDURE — 3008F BODY MASS INDEX DOCD: CPT | Mod: CPTII,S$GLB,, | Performed by: INTERNAL MEDICINE

## 2021-03-16 PROCEDURE — 3288F PR FALLS RISK ASSESSMENT DOCUMENTED: ICD-10-PCS | Mod: CPTII,S$GLB,, | Performed by: INTERNAL MEDICINE

## 2021-03-17 ENCOUNTER — TELEPHONE (OUTPATIENT)
Dept: INTERNAL MEDICINE | Facility: CLINIC | Age: 71
End: 2021-03-17

## 2021-03-24 ENCOUNTER — IMMUNIZATION (OUTPATIENT)
Dept: PRIMARY CARE CLINIC | Facility: CLINIC | Age: 71
End: 2021-03-24
Payer: MEDICARE

## 2021-03-24 DIAGNOSIS — Z23 NEED FOR VACCINATION: Primary | ICD-10-CM

## 2021-03-24 PROCEDURE — 0002A COVID-19, MRNA, LNP-S, PF, 30 MCG/0.3 ML DOSE VACCINE: CPT | Mod: HCNC,PBBFAC | Performed by: EMERGENCY MEDICINE

## 2021-03-24 PROCEDURE — 91300 COVID-19, MRNA, LNP-S, PF, 30 MCG/0.3 ML DOSE VACCINE: CPT | Mod: HCNC,PBBFAC | Performed by: EMERGENCY MEDICINE

## 2021-04-07 ENCOUNTER — TELEPHONE (OUTPATIENT)
Dept: INTERNAL MEDICINE | Facility: CLINIC | Age: 71
End: 2021-04-07

## 2021-04-07 DIAGNOSIS — H69.91 EUSTACHIAN TUBE DYSFUNCTION, RIGHT: ICD-10-CM

## 2021-04-07 RX ORDER — LEVOCETIRIZINE DIHYDROCHLORIDE 5 MG/1
5 TABLET, FILM COATED ORAL NIGHTLY
Qty: 30 TABLET | Refills: 11 | Status: SHIPPED | OUTPATIENT
Start: 2021-04-07 | End: 2021-08-19 | Stop reason: SDUPTHER

## 2021-04-07 RX ORDER — FLUTICASONE PROPIONATE 50 MCG
2 SPRAY, SUSPENSION (ML) NASAL DAILY
Qty: 16 G | Refills: 12 | Status: SHIPPED | OUTPATIENT
Start: 2021-04-07 | End: 2021-05-07

## 2021-04-08 ENCOUNTER — OFFICE VISIT (OUTPATIENT)
Dept: INTERNAL MEDICINE | Facility: CLINIC | Age: 71
End: 2021-04-08
Payer: MEDICARE

## 2021-04-08 VITALS
WEIGHT: 173.31 LBS | HEIGHT: 66 IN | BODY MASS INDEX: 27.85 KG/M2 | TEMPERATURE: 98 F | RESPIRATION RATE: 16 BRPM | SYSTOLIC BLOOD PRESSURE: 138 MMHG | HEART RATE: 76 BPM | DIASTOLIC BLOOD PRESSURE: 86 MMHG | OXYGEN SATURATION: 95 %

## 2021-04-08 DIAGNOSIS — G89.29 CHRONIC EAR PAIN, BILATERAL: ICD-10-CM

## 2021-04-08 DIAGNOSIS — H92.03 CHRONIC EAR PAIN, BILATERAL: ICD-10-CM

## 2021-04-08 DIAGNOSIS — H60.501 ACUTE OTITIS EXTERNA OF RIGHT EAR, UNSPECIFIED TYPE: Primary | ICD-10-CM

## 2021-04-08 DIAGNOSIS — H66.92 LEFT OTITIS MEDIA, UNSPECIFIED OTITIS MEDIA TYPE: ICD-10-CM

## 2021-04-08 PROCEDURE — 96372 THER/PROPH/DIAG INJ SC/IM: CPT | Mod: S$GLB,,, | Performed by: INTERNAL MEDICINE

## 2021-04-08 PROCEDURE — 3008F BODY MASS INDEX DOCD: CPT | Mod: CPTII,S$GLB,, | Performed by: INTERNAL MEDICINE

## 2021-04-08 PROCEDURE — 99213 OFFICE O/P EST LOW 20 MIN: CPT | Mod: 25,S$GLB,, | Performed by: INTERNAL MEDICINE

## 2021-04-08 PROCEDURE — 3288F PR FALLS RISK ASSESSMENT DOCUMENTED: ICD-10-PCS | Mod: CPTII,S$GLB,, | Performed by: INTERNAL MEDICINE

## 2021-04-08 PROCEDURE — 3008F PR BODY MASS INDEX (BMI) DOCUMENTED: ICD-10-PCS | Mod: CPTII,S$GLB,, | Performed by: INTERNAL MEDICINE

## 2021-04-08 PROCEDURE — 1126F AMNT PAIN NOTED NONE PRSNT: CPT | Mod: S$GLB,,, | Performed by: INTERNAL MEDICINE

## 2021-04-08 PROCEDURE — 99213 PR OFFICE/OUTPT VISIT, EST, LEVL III, 20-29 MIN: ICD-10-PCS | Mod: 25,S$GLB,, | Performed by: INTERNAL MEDICINE

## 2021-04-08 PROCEDURE — 1159F PR MEDICATION LIST DOCUMENTED IN MEDICAL RECORD: ICD-10-PCS | Mod: S$GLB,,, | Performed by: INTERNAL MEDICINE

## 2021-04-08 PROCEDURE — 1101F PT FALLS ASSESS-DOCD LE1/YR: CPT | Mod: CPTII,S$GLB,, | Performed by: INTERNAL MEDICINE

## 2021-04-08 PROCEDURE — 96372 PR INJECTION,THERAP/PROPH/DIAG2ST, IM OR SUBCUT: ICD-10-PCS | Mod: S$GLB,,, | Performed by: INTERNAL MEDICINE

## 2021-04-08 PROCEDURE — 3288F FALL RISK ASSESSMENT DOCD: CPT | Mod: CPTII,S$GLB,, | Performed by: INTERNAL MEDICINE

## 2021-04-08 PROCEDURE — 1101F PR PT FALLS ASSESS DOC 0-1 FALLS W/OUT INJ PAST YR: ICD-10-PCS | Mod: CPTII,S$GLB,, | Performed by: INTERNAL MEDICINE

## 2021-04-08 PROCEDURE — 1159F MED LIST DOCD IN RCRD: CPT | Mod: S$GLB,,, | Performed by: INTERNAL MEDICINE

## 2021-04-08 PROCEDURE — 1126F PR PAIN SEVERITY QUANTIFIED, NO PAIN PRESENT: ICD-10-PCS | Mod: S$GLB,,, | Performed by: INTERNAL MEDICINE

## 2021-04-08 PROCEDURE — 99999 PR PBB SHADOW E&M-EST. PATIENT-LVL IV: CPT | Mod: PBBFAC,,, | Performed by: INTERNAL MEDICINE

## 2021-04-08 PROCEDURE — 99999 PR PBB SHADOW E&M-EST. PATIENT-LVL IV: ICD-10-PCS | Mod: PBBFAC,,, | Performed by: INTERNAL MEDICINE

## 2021-04-08 RX ORDER — TRIAMCINOLONE ACETONIDE 40 MG/ML
40 INJECTION, SUSPENSION INTRA-ARTICULAR; INTRAMUSCULAR
Status: COMPLETED | OUTPATIENT
Start: 2021-04-08 | End: 2021-04-08

## 2021-04-08 RX ORDER — AMOXICILLIN AND CLAVULANATE POTASSIUM 875; 125 MG/1; MG/1
1 TABLET, FILM COATED ORAL 2 TIMES DAILY
Qty: 14 TABLET | Refills: 0 | Status: SHIPPED | OUTPATIENT
Start: 2021-04-08 | End: 2021-04-15

## 2021-04-08 RX ORDER — ACETIC ACID 20.65 MG/ML
4 SOLUTION AURICULAR (OTIC) 3 TIMES DAILY
Qty: 6 ML | Refills: 0 | Status: SHIPPED | OUTPATIENT
Start: 2021-04-08 | End: 2021-04-18

## 2021-04-08 RX ADMIN — TRIAMCINOLONE ACETONIDE 40 MG: 40 INJECTION, SUSPENSION INTRA-ARTICULAR; INTRAMUSCULAR at 04:04

## 2021-05-28 ENCOUNTER — OFFICE VISIT (OUTPATIENT)
Dept: INTERNAL MEDICINE | Facility: CLINIC | Age: 71
End: 2021-05-28
Payer: MEDICARE

## 2021-05-28 VITALS
SYSTOLIC BLOOD PRESSURE: 122 MMHG | TEMPERATURE: 98 F | OXYGEN SATURATION: 98 % | WEIGHT: 175.94 LBS | BODY MASS INDEX: 28.27 KG/M2 | HEART RATE: 76 BPM | DIASTOLIC BLOOD PRESSURE: 90 MMHG | HEIGHT: 66 IN | RESPIRATION RATE: 16 BRPM

## 2021-05-28 DIAGNOSIS — H66.001 NON-RECURRENT ACUTE SUPPURATIVE OTITIS MEDIA OF RIGHT EAR WITHOUT SPONTANEOUS RUPTURE OF TYMPANIC MEMBRANE: Primary | ICD-10-CM

## 2021-05-28 PROCEDURE — 1126F AMNT PAIN NOTED NONE PRSNT: CPT | Mod: S$GLB,,, | Performed by: NURSE PRACTITIONER

## 2021-05-28 PROCEDURE — 1126F PR PAIN SEVERITY QUANTIFIED, NO PAIN PRESENT: ICD-10-PCS | Mod: S$GLB,,, | Performed by: NURSE PRACTITIONER

## 2021-05-28 PROCEDURE — 3288F PR FALLS RISK ASSESSMENT DOCUMENTED: ICD-10-PCS | Mod: CPTII,S$GLB,, | Performed by: NURSE PRACTITIONER

## 2021-05-28 PROCEDURE — 1101F PT FALLS ASSESS-DOCD LE1/YR: CPT | Mod: CPTII,S$GLB,, | Performed by: NURSE PRACTITIONER

## 2021-05-28 PROCEDURE — 3008F PR BODY MASS INDEX (BMI) DOCUMENTED: ICD-10-PCS | Mod: CPTII,S$GLB,, | Performed by: NURSE PRACTITIONER

## 2021-05-28 PROCEDURE — 1101F PR PT FALLS ASSESS DOC 0-1 FALLS W/OUT INJ PAST YR: ICD-10-PCS | Mod: CPTII,S$GLB,, | Performed by: NURSE PRACTITIONER

## 2021-05-28 PROCEDURE — 99999 PR PBB SHADOW E&M-EST. PATIENT-LVL III: ICD-10-PCS | Mod: PBBFAC,,, | Performed by: NURSE PRACTITIONER

## 2021-05-28 PROCEDURE — 3008F BODY MASS INDEX DOCD: CPT | Mod: CPTII,S$GLB,, | Performed by: NURSE PRACTITIONER

## 2021-05-28 PROCEDURE — 99214 OFFICE O/P EST MOD 30 MIN: CPT | Mod: S$GLB,,, | Performed by: NURSE PRACTITIONER

## 2021-05-28 PROCEDURE — 99214 PR OFFICE/OUTPT VISIT, EST, LEVL IV, 30-39 MIN: ICD-10-PCS | Mod: S$GLB,,, | Performed by: NURSE PRACTITIONER

## 2021-05-28 PROCEDURE — 3288F FALL RISK ASSESSMENT DOCD: CPT | Mod: CPTII,S$GLB,, | Performed by: NURSE PRACTITIONER

## 2021-05-28 PROCEDURE — 99999 PR PBB SHADOW E&M-EST. PATIENT-LVL III: CPT | Mod: PBBFAC,,, | Performed by: NURSE PRACTITIONER

## 2021-05-28 RX ORDER — AMOXICILLIN AND CLAVULANATE POTASSIUM 875; 125 MG/1; MG/1
1 TABLET, FILM COATED ORAL EVERY 12 HOURS
Qty: 20 TABLET | Refills: 0 | Status: SHIPPED | OUTPATIENT
Start: 2021-05-28 | End: 2021-06-07

## 2021-06-10 DIAGNOSIS — E03.4 HYPOTHYROIDISM DUE TO ACQUIRED ATROPHY OF THYROID: ICD-10-CM

## 2021-06-10 RX ORDER — LEVOTHYROXINE SODIUM 100 UG/1
100 TABLET ORAL EVERY MORNING
Qty: 90 TABLET | Refills: 0 | Status: SHIPPED | OUTPATIENT
Start: 2021-06-10 | End: 2022-02-23 | Stop reason: SDUPTHER

## 2021-07-13 ENCOUNTER — OFFICE VISIT (OUTPATIENT)
Dept: INTERNAL MEDICINE | Facility: CLINIC | Age: 71
End: 2021-07-13
Payer: MEDICARE

## 2021-07-13 VITALS
SYSTOLIC BLOOD PRESSURE: 110 MMHG | OXYGEN SATURATION: 96 % | DIASTOLIC BLOOD PRESSURE: 80 MMHG | HEART RATE: 68 BPM | WEIGHT: 172.81 LBS | TEMPERATURE: 98 F | HEIGHT: 66 IN | BODY MASS INDEX: 27.77 KG/M2

## 2021-07-13 DIAGNOSIS — H69.91 EUSTACHIAN TUBE DYSFUNCTION, RIGHT: Primary | ICD-10-CM

## 2021-07-13 PROCEDURE — 96372 THER/PROPH/DIAG INJ SC/IM: CPT | Mod: S$GLB,,, | Performed by: INTERNAL MEDICINE

## 2021-07-13 PROCEDURE — 99999 PR PBB SHADOW E&M-EST. PATIENT-LVL III: CPT | Mod: PBBFAC,,, | Performed by: INTERNAL MEDICINE

## 2021-07-13 PROCEDURE — 3288F FALL RISK ASSESSMENT DOCD: CPT | Mod: CPTII,S$GLB,, | Performed by: INTERNAL MEDICINE

## 2021-07-13 PROCEDURE — 3008F PR BODY MASS INDEX (BMI) DOCUMENTED: ICD-10-PCS | Mod: CPTII,S$GLB,, | Performed by: INTERNAL MEDICINE

## 2021-07-13 PROCEDURE — 1159F PR MEDICATION LIST DOCUMENTED IN MEDICAL RECORD: ICD-10-PCS | Mod: S$GLB,,, | Performed by: INTERNAL MEDICINE

## 2021-07-13 PROCEDURE — 96372 PR INJECTION,THERAP/PROPH/DIAG2ST, IM OR SUBCUT: ICD-10-PCS | Mod: S$GLB,,, | Performed by: INTERNAL MEDICINE

## 2021-07-13 PROCEDURE — 1126F AMNT PAIN NOTED NONE PRSNT: CPT | Mod: S$GLB,,, | Performed by: INTERNAL MEDICINE

## 2021-07-13 PROCEDURE — 99214 PR OFFICE/OUTPT VISIT, EST, LEVL IV, 30-39 MIN: ICD-10-PCS | Mod: 25,S$GLB,, | Performed by: INTERNAL MEDICINE

## 2021-07-13 PROCEDURE — 1126F PR PAIN SEVERITY QUANTIFIED, NO PAIN PRESENT: ICD-10-PCS | Mod: S$GLB,,, | Performed by: INTERNAL MEDICINE

## 2021-07-13 PROCEDURE — 1159F MED LIST DOCD IN RCRD: CPT | Mod: S$GLB,,, | Performed by: INTERNAL MEDICINE

## 2021-07-13 PROCEDURE — 99999 PR PBB SHADOW E&M-EST. PATIENT-LVL III: ICD-10-PCS | Mod: PBBFAC,,, | Performed by: INTERNAL MEDICINE

## 2021-07-13 PROCEDURE — 3288F PR FALLS RISK ASSESSMENT DOCUMENTED: ICD-10-PCS | Mod: CPTII,S$GLB,, | Performed by: INTERNAL MEDICINE

## 2021-07-13 PROCEDURE — 1101F PR PT FALLS ASSESS DOC 0-1 FALLS W/OUT INJ PAST YR: ICD-10-PCS | Mod: CPTII,S$GLB,, | Performed by: INTERNAL MEDICINE

## 2021-07-13 PROCEDURE — 1101F PT FALLS ASSESS-DOCD LE1/YR: CPT | Mod: CPTII,S$GLB,, | Performed by: INTERNAL MEDICINE

## 2021-07-13 PROCEDURE — 99214 OFFICE O/P EST MOD 30 MIN: CPT | Mod: 25,S$GLB,, | Performed by: INTERNAL MEDICINE

## 2021-07-13 PROCEDURE — 3008F BODY MASS INDEX DOCD: CPT | Mod: CPTII,S$GLB,, | Performed by: INTERNAL MEDICINE

## 2021-07-13 RX ORDER — TRIAMCINOLONE ACETONIDE 40 MG/ML
40 INJECTION, SUSPENSION INTRA-ARTICULAR; INTRAMUSCULAR
Status: COMPLETED | OUTPATIENT
Start: 2021-07-13 | End: 2021-07-13

## 2021-07-13 RX ADMIN — TRIAMCINOLONE ACETONIDE 40 MG: 40 INJECTION, SUSPENSION INTRA-ARTICULAR; INTRAMUSCULAR at 02:07

## 2021-07-14 ENCOUNTER — TELEPHONE (OUTPATIENT)
Dept: INTERNAL MEDICINE | Facility: CLINIC | Age: 71
End: 2021-07-14

## 2021-07-14 RX ORDER — AMOXICILLIN AND CLAVULANATE POTASSIUM 875; 125 MG/1; MG/1
1 TABLET, FILM COATED ORAL 2 TIMES DAILY
Qty: 20 TABLET | Refills: 0 | Status: SHIPPED | OUTPATIENT
Start: 2021-07-14 | End: 2021-07-24

## 2021-07-16 ENCOUNTER — PES CALL (OUTPATIENT)
Dept: ADMINISTRATIVE | Facility: CLINIC | Age: 71
End: 2021-07-16

## 2021-07-23 ENCOUNTER — TELEPHONE (OUTPATIENT)
Dept: INTERNAL MEDICINE | Facility: CLINIC | Age: 71
End: 2021-07-23

## 2021-07-24 RX ORDER — SULFAMETHOXAZOLE AND TRIMETHOPRIM 800; 160 MG/1; MG/1
1 TABLET ORAL 2 TIMES DAILY
Qty: 14 TABLET | Refills: 0 | Status: SHIPPED | OUTPATIENT
Start: 2021-07-24 | End: 2021-07-31

## 2021-08-05 ENCOUNTER — TELEPHONE (OUTPATIENT)
Dept: INTERNAL MEDICINE | Facility: CLINIC | Age: 71
End: 2021-08-05

## 2021-08-12 ENCOUNTER — TELEPHONE (OUTPATIENT)
Dept: INTERNAL MEDICINE | Facility: CLINIC | Age: 71
End: 2021-08-12

## 2021-08-12 DIAGNOSIS — H66.90 OTITIS MEDIA, UNSPECIFIED LATERALITY, UNSPECIFIED OTITIS MEDIA TYPE: Primary | ICD-10-CM

## 2021-08-12 DIAGNOSIS — H66.007 RECURRENT ACUTE SUPPURATIVE OTITIS MEDIA WITHOUT SPONTANEOUS RUPTURE OF TYMPANIC MEMBRANE, UNSPECIFIED LATERALITY: ICD-10-CM

## 2021-08-12 DIAGNOSIS — H69.90 DYSFUNCTION OF EUSTACHIAN TUBE, UNSPECIFIED LATERALITY: ICD-10-CM

## 2021-08-12 RX ORDER — AMOXICILLIN AND CLAVULANATE POTASSIUM 875; 125 MG/1; MG/1
1 TABLET, FILM COATED ORAL EVERY 12 HOURS
Qty: 20 TABLET | Refills: 0 | Status: SHIPPED | OUTPATIENT
Start: 2021-08-12 | End: 2021-08-22

## 2021-08-16 ENCOUNTER — TELEPHONE (OUTPATIENT)
Dept: INTERNAL MEDICINE | Facility: CLINIC | Age: 71
End: 2021-08-16

## 2021-08-16 PROBLEM — H66.90 OTITIS MEDIA: Status: ACTIVE | Noted: 2021-08-16

## 2021-08-16 PROBLEM — H66.007 RECURRENT ACUTE SUPPURATIVE OTITIS MEDIA WITHOUT SPONTANEOUS RUPTURE OF TYMPANIC MEMBRANE: Status: ACTIVE | Noted: 2021-08-16

## 2021-08-16 PROBLEM — H69.90 DYSFUNCTION OF EUSTACHIAN TUBE: Status: ACTIVE | Noted: 2021-08-16

## 2021-08-18 ENCOUNTER — TELEPHONE (OUTPATIENT)
Dept: INTERNAL MEDICINE | Facility: CLINIC | Age: 71
End: 2021-08-18

## 2021-08-19 ENCOUNTER — OFFICE VISIT (OUTPATIENT)
Dept: INTERNAL MEDICINE | Facility: CLINIC | Age: 71
End: 2021-08-19
Payer: MEDICARE

## 2021-08-19 VITALS
DIASTOLIC BLOOD PRESSURE: 80 MMHG | WEIGHT: 168.63 LBS | BODY MASS INDEX: 27.1 KG/M2 | OXYGEN SATURATION: 98 % | TEMPERATURE: 98 F | SYSTOLIC BLOOD PRESSURE: 100 MMHG | HEART RATE: 71 BPM | HEIGHT: 66 IN

## 2021-08-19 DIAGNOSIS — H60.91 RECURRENT OTITIS EXTERNA OF RIGHT EAR: ICD-10-CM

## 2021-08-19 DIAGNOSIS — H69.91 EUSTACHIAN TUBE DYSFUNCTION, RIGHT: Primary | ICD-10-CM

## 2021-08-19 PROCEDURE — 3074F SYST BP LT 130 MM HG: CPT | Mod: CPTII,S$GLB,, | Performed by: INTERNAL MEDICINE

## 2021-08-19 PROCEDURE — 1159F MED LIST DOCD IN RCRD: CPT | Mod: CPTII,S$GLB,, | Performed by: INTERNAL MEDICINE

## 2021-08-19 PROCEDURE — 1126F AMNT PAIN NOTED NONE PRSNT: CPT | Mod: CPTII,S$GLB,, | Performed by: INTERNAL MEDICINE

## 2021-08-19 PROCEDURE — 3008F PR BODY MASS INDEX (BMI) DOCUMENTED: ICD-10-PCS | Mod: CPTII,S$GLB,, | Performed by: INTERNAL MEDICINE

## 2021-08-19 PROCEDURE — 1160F PR REVIEW ALL MEDS BY PRESCRIBER/CLIN PHARMACIST DOCUMENTED: ICD-10-PCS | Mod: CPTII,S$GLB,, | Performed by: INTERNAL MEDICINE

## 2021-08-19 PROCEDURE — 3079F DIAST BP 80-89 MM HG: CPT | Mod: CPTII,S$GLB,, | Performed by: INTERNAL MEDICINE

## 2021-08-19 PROCEDURE — 3074F PR MOST RECENT SYSTOLIC BLOOD PRESSURE < 130 MM HG: ICD-10-PCS | Mod: CPTII,S$GLB,, | Performed by: INTERNAL MEDICINE

## 2021-08-19 PROCEDURE — 3008F BODY MASS INDEX DOCD: CPT | Mod: CPTII,S$GLB,, | Performed by: INTERNAL MEDICINE

## 2021-08-19 PROCEDURE — 1159F PR MEDICATION LIST DOCUMENTED IN MEDICAL RECORD: ICD-10-PCS | Mod: CPTII,S$GLB,, | Performed by: INTERNAL MEDICINE

## 2021-08-19 PROCEDURE — 99999 PR PBB SHADOW E&M-EST. PATIENT-LVL IV: ICD-10-PCS | Mod: PBBFAC,,, | Performed by: INTERNAL MEDICINE

## 2021-08-19 PROCEDURE — 3288F FALL RISK ASSESSMENT DOCD: CPT | Mod: CPTII,S$GLB,, | Performed by: INTERNAL MEDICINE

## 2021-08-19 PROCEDURE — 1101F PT FALLS ASSESS-DOCD LE1/YR: CPT | Mod: CPTII,S$GLB,, | Performed by: INTERNAL MEDICINE

## 2021-08-19 PROCEDURE — 3288F PR FALLS RISK ASSESSMENT DOCUMENTED: ICD-10-PCS | Mod: CPTII,S$GLB,, | Performed by: INTERNAL MEDICINE

## 2021-08-19 PROCEDURE — 1160F RVW MEDS BY RX/DR IN RCRD: CPT | Mod: CPTII,S$GLB,, | Performed by: INTERNAL MEDICINE

## 2021-08-19 PROCEDURE — 3079F PR MOST RECENT DIASTOLIC BLOOD PRESSURE 80-89 MM HG: ICD-10-PCS | Mod: CPTII,S$GLB,, | Performed by: INTERNAL MEDICINE

## 2021-08-19 PROCEDURE — 99999 PR PBB SHADOW E&M-EST. PATIENT-LVL IV: CPT | Mod: PBBFAC,,, | Performed by: INTERNAL MEDICINE

## 2021-08-19 PROCEDURE — 1101F PR PT FALLS ASSESS DOC 0-1 FALLS W/OUT INJ PAST YR: ICD-10-PCS | Mod: CPTII,S$GLB,, | Performed by: INTERNAL MEDICINE

## 2021-08-19 PROCEDURE — 99214 OFFICE O/P EST MOD 30 MIN: CPT | Mod: S$GLB,,, | Performed by: INTERNAL MEDICINE

## 2021-08-19 PROCEDURE — 99214 PR OFFICE/OUTPT VISIT, EST, LEVL IV, 30-39 MIN: ICD-10-PCS | Mod: S$GLB,,, | Performed by: INTERNAL MEDICINE

## 2021-08-19 PROCEDURE — 1126F PR PAIN SEVERITY QUANTIFIED, NO PAIN PRESENT: ICD-10-PCS | Mod: CPTII,S$GLB,, | Performed by: INTERNAL MEDICINE

## 2021-08-19 RX ORDER — TRIAMCINOLONE ACETONIDE 40 MG/ML
INJECTION, SUSPENSION INTRA-ARTICULAR; INTRAMUSCULAR
COMMUNITY
Start: 2021-04-08 | End: 2022-03-10

## 2021-08-19 RX ORDER — CEPHALEXIN 500 MG/1
CAPSULE ORAL
COMMUNITY
Start: 2021-02-22 | End: 2021-08-25

## 2021-08-19 RX ORDER — DEXAMETHASONE 0.5 MG/1
TABLET ORAL
COMMUNITY
Start: 2021-03-18 | End: 2022-03-10

## 2021-08-19 RX ORDER — LEVOCETIRIZINE DIHYDROCHLORIDE 5 MG/1
5 TABLET, FILM COATED ORAL NIGHTLY
Qty: 90 TABLET | Refills: 3 | Status: SHIPPED | OUTPATIENT
Start: 2021-08-19 | End: 2021-11-12 | Stop reason: SDUPTHER

## 2021-08-23 ENCOUNTER — TELEPHONE (OUTPATIENT)
Dept: INTERNAL MEDICINE | Facility: CLINIC | Age: 71
End: 2021-08-23

## 2021-08-23 DIAGNOSIS — H66.90 OTITIS MEDIA, UNSPECIFIED LATERALITY, UNSPECIFIED OTITIS MEDIA TYPE: Primary | ICD-10-CM

## 2021-08-25 RX ORDER — AMOXICILLIN 875 MG/1
875 TABLET, FILM COATED ORAL 2 TIMES DAILY
Qty: 20 TABLET | Refills: 0 | Status: SHIPPED | OUTPATIENT
Start: 2021-08-25 | End: 2021-09-20

## 2021-09-18 ENCOUNTER — NURSE TRIAGE (OUTPATIENT)
Dept: ADMINISTRATIVE | Facility: CLINIC | Age: 71
End: 2021-09-18

## 2021-09-18 ENCOUNTER — TELEPHONE (OUTPATIENT)
Dept: INTERNAL MEDICINE | Facility: CLINIC | Age: 71
End: 2021-09-18

## 2021-09-18 DIAGNOSIS — H66.007 RECURRENT ACUTE SUPPURATIVE OTITIS MEDIA WITHOUT SPONTANEOUS RUPTURE OF TYMPANIC MEMBRANE, UNSPECIFIED LATERALITY: Primary | ICD-10-CM

## 2021-09-18 DIAGNOSIS — G89.29 CHRONIC EAR PAIN, UNSPECIFIED LATERALITY: ICD-10-CM

## 2021-09-18 DIAGNOSIS — H69.90 DYSFUNCTION OF EUSTACHIAN TUBE, UNSPECIFIED LATERALITY: ICD-10-CM

## 2021-09-18 DIAGNOSIS — H92.09 CHRONIC EAR PAIN, UNSPECIFIED LATERALITY: ICD-10-CM

## 2021-09-20 RX ORDER — AMOXICILLIN 875 MG/1
875 TABLET, FILM COATED ORAL 2 TIMES DAILY
Qty: 20 TABLET | Refills: 0 | Status: SHIPPED | OUTPATIENT
Start: 2021-09-20 | End: 2021-11-18 | Stop reason: SDUPTHER

## 2021-10-16 ENCOUNTER — NURSE TRIAGE (OUTPATIENT)
Dept: ADMINISTRATIVE | Facility: CLINIC | Age: 71
End: 2021-10-16

## 2021-10-16 ENCOUNTER — OFFICE VISIT (OUTPATIENT)
Dept: URGENT CARE | Facility: CLINIC | Age: 71
End: 2021-10-16
Payer: MEDICARE

## 2021-10-16 VITALS
BODY MASS INDEX: 27 KG/M2 | TEMPERATURE: 99 F | DIASTOLIC BLOOD PRESSURE: 79 MMHG | RESPIRATION RATE: 15 BRPM | SYSTOLIC BLOOD PRESSURE: 133 MMHG | HEIGHT: 66 IN | HEART RATE: 68 BPM | OXYGEN SATURATION: 96 % | WEIGHT: 168 LBS

## 2021-10-16 DIAGNOSIS — H69.93 DYSFUNCTION OF EUSTACHIAN TUBE, BILATERAL: Primary | ICD-10-CM

## 2021-10-16 PROCEDURE — 3008F PR BODY MASS INDEX (BMI) DOCUMENTED: ICD-10-PCS | Mod: CPTII,S$GLB,, | Performed by: NURSE PRACTITIONER

## 2021-10-16 PROCEDURE — 3008F BODY MASS INDEX DOCD: CPT | Mod: CPTII,S$GLB,, | Performed by: NURSE PRACTITIONER

## 2021-10-16 PROCEDURE — 1159F PR MEDICATION LIST DOCUMENTED IN MEDICAL RECORD: ICD-10-PCS | Mod: CPTII,S$GLB,, | Performed by: NURSE PRACTITIONER

## 2021-10-16 PROCEDURE — 99213 OFFICE O/P EST LOW 20 MIN: CPT | Mod: S$GLB,,, | Performed by: NURSE PRACTITIONER

## 2021-10-16 PROCEDURE — 3078F PR MOST RECENT DIASTOLIC BLOOD PRESSURE < 80 MM HG: ICD-10-PCS | Mod: CPTII,S$GLB,, | Performed by: NURSE PRACTITIONER

## 2021-10-16 PROCEDURE — 3078F DIAST BP <80 MM HG: CPT | Mod: CPTII,S$GLB,, | Performed by: NURSE PRACTITIONER

## 2021-10-16 PROCEDURE — 1159F MED LIST DOCD IN RCRD: CPT | Mod: CPTII,S$GLB,, | Performed by: NURSE PRACTITIONER

## 2021-10-16 PROCEDURE — 99213 PR OFFICE/OUTPT VISIT, EST, LEVL III, 20-29 MIN: ICD-10-PCS | Mod: S$GLB,,, | Performed by: NURSE PRACTITIONER

## 2021-10-16 PROCEDURE — 3075F SYST BP GE 130 - 139MM HG: CPT | Mod: CPTII,S$GLB,, | Performed by: NURSE PRACTITIONER

## 2021-10-16 PROCEDURE — 3075F PR MOST RECENT SYSTOLIC BLOOD PRESS GE 130-139MM HG: ICD-10-PCS | Mod: CPTII,S$GLB,, | Performed by: NURSE PRACTITIONER

## 2021-10-23 ENCOUNTER — OFFICE VISIT (OUTPATIENT)
Dept: URGENT CARE | Facility: CLINIC | Age: 71
End: 2021-10-23
Payer: MEDICARE

## 2021-10-23 VITALS
HEART RATE: 68 BPM | HEIGHT: 66 IN | RESPIRATION RATE: 19 BRPM | TEMPERATURE: 98 F | OXYGEN SATURATION: 98 % | WEIGHT: 168 LBS | DIASTOLIC BLOOD PRESSURE: 86 MMHG | BODY MASS INDEX: 27 KG/M2 | SYSTOLIC BLOOD PRESSURE: 148 MMHG

## 2021-10-23 DIAGNOSIS — H60.313 ACUTE DIFFUSE OTITIS EXTERNA OF BOTH EARS: Primary | ICD-10-CM

## 2021-10-23 PROCEDURE — 1159F PR MEDICATION LIST DOCUMENTED IN MEDICAL RECORD: ICD-10-PCS | Mod: CPTII,S$GLB,, | Performed by: INTERNAL MEDICINE

## 2021-10-23 PROCEDURE — 3079F DIAST BP 80-89 MM HG: CPT | Mod: CPTII,S$GLB,, | Performed by: INTERNAL MEDICINE

## 2021-10-23 PROCEDURE — 3079F PR MOST RECENT DIASTOLIC BLOOD PRESSURE 80-89 MM HG: ICD-10-PCS | Mod: CPTII,S$GLB,, | Performed by: INTERNAL MEDICINE

## 2021-10-23 PROCEDURE — 1160F RVW MEDS BY RX/DR IN RCRD: CPT | Mod: CPTII,S$GLB,, | Performed by: INTERNAL MEDICINE

## 2021-10-23 PROCEDURE — 3008F PR BODY MASS INDEX (BMI) DOCUMENTED: ICD-10-PCS | Mod: CPTII,S$GLB,, | Performed by: INTERNAL MEDICINE

## 2021-10-23 PROCEDURE — 1159F MED LIST DOCD IN RCRD: CPT | Mod: CPTII,S$GLB,, | Performed by: INTERNAL MEDICINE

## 2021-10-23 PROCEDURE — 3077F PR MOST RECENT SYSTOLIC BLOOD PRESSURE >= 140 MM HG: ICD-10-PCS | Mod: CPTII,S$GLB,, | Performed by: INTERNAL MEDICINE

## 2021-10-23 PROCEDURE — 99214 OFFICE O/P EST MOD 30 MIN: CPT | Mod: S$GLB,,, | Performed by: INTERNAL MEDICINE

## 2021-10-23 PROCEDURE — 99214 PR OFFICE/OUTPT VISIT, EST, LEVL IV, 30-39 MIN: ICD-10-PCS | Mod: S$GLB,,, | Performed by: INTERNAL MEDICINE

## 2021-10-23 PROCEDURE — 1160F PR REVIEW ALL MEDS BY PRESCRIBER/CLIN PHARMACIST DOCUMENTED: ICD-10-PCS | Mod: CPTII,S$GLB,, | Performed by: INTERNAL MEDICINE

## 2021-10-23 PROCEDURE — 3008F BODY MASS INDEX DOCD: CPT | Mod: CPTII,S$GLB,, | Performed by: INTERNAL MEDICINE

## 2021-10-23 PROCEDURE — 3077F SYST BP >= 140 MM HG: CPT | Mod: CPTII,S$GLB,, | Performed by: INTERNAL MEDICINE

## 2021-10-23 RX ORDER — NEOMYCIN SULFATE, POLYMYXIN B SULFATE, HYDROCORTISONE 3.5; 10000; 1 MG/ML; [USP'U]/ML; MG/ML
4 SOLUTION/ DROPS AURICULAR (OTIC) EVERY 6 HOURS
Qty: 10 ML | Refills: 0 | Status: SHIPPED | OUTPATIENT
Start: 2021-10-23 | End: 2021-10-30

## 2021-10-29 ENCOUNTER — TELEPHONE (OUTPATIENT)
Dept: INTERNAL MEDICINE | Facility: CLINIC | Age: 71
End: 2021-10-29
Payer: MEDICARE

## 2021-11-12 ENCOUNTER — OFFICE VISIT (OUTPATIENT)
Dept: INTERNAL MEDICINE | Facility: CLINIC | Age: 71
End: 2021-11-12
Payer: MEDICARE

## 2021-11-12 VITALS
DIASTOLIC BLOOD PRESSURE: 70 MMHG | WEIGHT: 173.5 LBS | BODY MASS INDEX: 27.88 KG/M2 | RESPIRATION RATE: 10 BRPM | SYSTOLIC BLOOD PRESSURE: 138 MMHG | HEIGHT: 66 IN | HEART RATE: 70 BPM

## 2021-11-12 DIAGNOSIS — H69.91 EUSTACHIAN TUBE DYSFUNCTION, RIGHT: ICD-10-CM

## 2021-11-12 DIAGNOSIS — H92.01 RIGHT EAR PAIN: Primary | ICD-10-CM

## 2021-11-12 PROCEDURE — 99999 PR PBB SHADOW E&M-EST. PATIENT-LVL III: ICD-10-PCS | Mod: PBBFAC,HCNC,, | Performed by: INTERNAL MEDICINE

## 2021-11-12 PROCEDURE — 1159F PR MEDICATION LIST DOCUMENTED IN MEDICAL RECORD: ICD-10-PCS | Mod: HCNC,CPTII,S$GLB, | Performed by: INTERNAL MEDICINE

## 2021-11-12 PROCEDURE — 3078F DIAST BP <80 MM HG: CPT | Mod: HCNC,CPTII,S$GLB, | Performed by: INTERNAL MEDICINE

## 2021-11-12 PROCEDURE — 99999 PR PBB SHADOW E&M-EST. PATIENT-LVL III: CPT | Mod: PBBFAC,HCNC,, | Performed by: INTERNAL MEDICINE

## 2021-11-12 PROCEDURE — 1159F MED LIST DOCD IN RCRD: CPT | Mod: HCNC,CPTII,S$GLB, | Performed by: INTERNAL MEDICINE

## 2021-11-12 PROCEDURE — 1160F RVW MEDS BY RX/DR IN RCRD: CPT | Mod: HCNC,CPTII,S$GLB, | Performed by: INTERNAL MEDICINE

## 2021-11-12 PROCEDURE — 3078F PR MOST RECENT DIASTOLIC BLOOD PRESSURE < 80 MM HG: ICD-10-PCS | Mod: HCNC,CPTII,S$GLB, | Performed by: INTERNAL MEDICINE

## 2021-11-12 PROCEDURE — 3008F PR BODY MASS INDEX (BMI) DOCUMENTED: ICD-10-PCS | Mod: HCNC,CPTII,S$GLB, | Performed by: INTERNAL MEDICINE

## 2021-11-12 PROCEDURE — 1160F PR REVIEW ALL MEDS BY PRESCRIBER/CLIN PHARMACIST DOCUMENTED: ICD-10-PCS | Mod: HCNC,CPTII,S$GLB, | Performed by: INTERNAL MEDICINE

## 2021-11-12 PROCEDURE — 1126F AMNT PAIN NOTED NONE PRSNT: CPT | Mod: HCNC,CPTII,S$GLB, | Performed by: INTERNAL MEDICINE

## 2021-11-12 PROCEDURE — 3075F SYST BP GE 130 - 139MM HG: CPT | Mod: HCNC,CPTII,S$GLB, | Performed by: INTERNAL MEDICINE

## 2021-11-12 PROCEDURE — 3008F BODY MASS INDEX DOCD: CPT | Mod: HCNC,CPTII,S$GLB, | Performed by: INTERNAL MEDICINE

## 2021-11-12 PROCEDURE — 99213 PR OFFICE/OUTPT VISIT, EST, LEVL III, 20-29 MIN: ICD-10-PCS | Mod: HCNC,S$GLB,, | Performed by: INTERNAL MEDICINE

## 2021-11-12 PROCEDURE — 1126F PR PAIN SEVERITY QUANTIFIED, NO PAIN PRESENT: ICD-10-PCS | Mod: HCNC,CPTII,S$GLB, | Performed by: INTERNAL MEDICINE

## 2021-11-12 PROCEDURE — 3075F PR MOST RECENT SYSTOLIC BLOOD PRESS GE 130-139MM HG: ICD-10-PCS | Mod: HCNC,CPTII,S$GLB, | Performed by: INTERNAL MEDICINE

## 2021-11-12 PROCEDURE — 99213 OFFICE O/P EST LOW 20 MIN: CPT | Mod: HCNC,S$GLB,, | Performed by: INTERNAL MEDICINE

## 2021-11-12 RX ORDER — LEVOCETIRIZINE DIHYDROCHLORIDE 5 MG/1
5 TABLET, FILM COATED ORAL NIGHTLY
Qty: 90 TABLET | Refills: 3 | Status: SHIPPED | OUTPATIENT
Start: 2021-11-12 | End: 2022-03-10

## 2021-11-17 ENCOUNTER — OFFICE VISIT (OUTPATIENT)
Dept: INTERNAL MEDICINE | Facility: CLINIC | Age: 71
End: 2021-11-17
Payer: MEDICARE

## 2021-11-17 VITALS
BODY MASS INDEX: 27.6 KG/M2 | HEIGHT: 66 IN | TEMPERATURE: 98 F | RESPIRATION RATE: 18 BRPM | HEART RATE: 65 BPM | WEIGHT: 171.75 LBS | DIASTOLIC BLOOD PRESSURE: 72 MMHG | OXYGEN SATURATION: 98 % | SYSTOLIC BLOOD PRESSURE: 112 MMHG

## 2021-11-17 DIAGNOSIS — H69.91 EUSTACHIAN TUBE DYSFUNCTION, RIGHT: Primary | ICD-10-CM

## 2021-11-17 PROCEDURE — 1126F PR PAIN SEVERITY QUANTIFIED, NO PAIN PRESENT: ICD-10-PCS | Mod: HCNC,CPTII,S$GLB, | Performed by: INTERNAL MEDICINE

## 2021-11-17 PROCEDURE — 3008F PR BODY MASS INDEX (BMI) DOCUMENTED: ICD-10-PCS | Mod: HCNC,CPTII,S$GLB, | Performed by: INTERNAL MEDICINE

## 2021-11-17 PROCEDURE — 3008F BODY MASS INDEX DOCD: CPT | Mod: HCNC,CPTII,S$GLB, | Performed by: INTERNAL MEDICINE

## 2021-11-17 PROCEDURE — 1160F PR REVIEW ALL MEDS BY PRESCRIBER/CLIN PHARMACIST DOCUMENTED: ICD-10-PCS | Mod: HCNC,CPTII,S$GLB, | Performed by: INTERNAL MEDICINE

## 2021-11-17 PROCEDURE — 99214 PR OFFICE/OUTPT VISIT, EST, LEVL IV, 30-39 MIN: ICD-10-PCS | Mod: HCNC,S$GLB,, | Performed by: INTERNAL MEDICINE

## 2021-11-17 PROCEDURE — 1159F MED LIST DOCD IN RCRD: CPT | Mod: HCNC,CPTII,S$GLB, | Performed by: INTERNAL MEDICINE

## 2021-11-17 PROCEDURE — 99999 PR PBB SHADOW E&M-EST. PATIENT-LVL III: CPT | Mod: PBBFAC,HCNC,, | Performed by: INTERNAL MEDICINE

## 2021-11-17 PROCEDURE — 3074F PR MOST RECENT SYSTOLIC BLOOD PRESSURE < 130 MM HG: ICD-10-PCS | Mod: HCNC,CPTII,S$GLB, | Performed by: INTERNAL MEDICINE

## 2021-11-17 PROCEDURE — 1159F PR MEDICATION LIST DOCUMENTED IN MEDICAL RECORD: ICD-10-PCS | Mod: HCNC,CPTII,S$GLB, | Performed by: INTERNAL MEDICINE

## 2021-11-17 PROCEDURE — 1160F RVW MEDS BY RX/DR IN RCRD: CPT | Mod: HCNC,CPTII,S$GLB, | Performed by: INTERNAL MEDICINE

## 2021-11-17 PROCEDURE — 1101F PR PT FALLS ASSESS DOC 0-1 FALLS W/OUT INJ PAST YR: ICD-10-PCS | Mod: HCNC,CPTII,S$GLB, | Performed by: INTERNAL MEDICINE

## 2021-11-17 PROCEDURE — 1126F AMNT PAIN NOTED NONE PRSNT: CPT | Mod: HCNC,CPTII,S$GLB, | Performed by: INTERNAL MEDICINE

## 2021-11-17 PROCEDURE — 3074F SYST BP LT 130 MM HG: CPT | Mod: HCNC,CPTII,S$GLB, | Performed by: INTERNAL MEDICINE

## 2021-11-17 PROCEDURE — 99999 PR PBB SHADOW E&M-EST. PATIENT-LVL III: ICD-10-PCS | Mod: PBBFAC,HCNC,, | Performed by: INTERNAL MEDICINE

## 2021-11-17 PROCEDURE — 99214 OFFICE O/P EST MOD 30 MIN: CPT | Mod: HCNC,S$GLB,, | Performed by: INTERNAL MEDICINE

## 2021-11-17 PROCEDURE — 1101F PT FALLS ASSESS-DOCD LE1/YR: CPT | Mod: HCNC,CPTII,S$GLB, | Performed by: INTERNAL MEDICINE

## 2021-11-17 PROCEDURE — 3078F DIAST BP <80 MM HG: CPT | Mod: HCNC,CPTII,S$GLB, | Performed by: INTERNAL MEDICINE

## 2021-11-17 PROCEDURE — 3288F PR FALLS RISK ASSESSMENT DOCUMENTED: ICD-10-PCS | Mod: HCNC,CPTII,S$GLB, | Performed by: INTERNAL MEDICINE

## 2021-11-17 PROCEDURE — 3078F PR MOST RECENT DIASTOLIC BLOOD PRESSURE < 80 MM HG: ICD-10-PCS | Mod: HCNC,CPTII,S$GLB, | Performed by: INTERNAL MEDICINE

## 2021-11-17 PROCEDURE — 3288F FALL RISK ASSESSMENT DOCD: CPT | Mod: HCNC,CPTII,S$GLB, | Performed by: INTERNAL MEDICINE

## 2021-11-17 RX ORDER — NEOMYCIN SULFATE, POLYMYXIN B SULFATE AND HYDROCORTISONE 10; 3.5; 1 MG/ML; MG/ML; [USP'U]/ML
3 SUSPENSION/ DROPS AURICULAR (OTIC) 4 TIMES DAILY
Qty: 10 ML | Refills: 1 | Status: SHIPPED | OUTPATIENT
Start: 2021-11-17 | End: 2022-03-10

## 2021-11-18 ENCOUNTER — TELEPHONE (OUTPATIENT)
Dept: INTERNAL MEDICINE | Facility: CLINIC | Age: 71
End: 2021-11-18
Payer: MEDICARE

## 2021-11-18 DIAGNOSIS — H92.09 OTALGIA, UNSPECIFIED LATERALITY: Primary | ICD-10-CM

## 2021-11-18 RX ORDER — AMOXICILLIN 875 MG/1
875 TABLET, FILM COATED ORAL 2 TIMES DAILY
Qty: 20 TABLET | Refills: 0 | Status: SHIPPED | OUTPATIENT
Start: 2021-11-18 | End: 2022-03-10

## 2021-12-02 ENCOUNTER — TELEPHONE (OUTPATIENT)
Dept: INTERNAL MEDICINE | Facility: CLINIC | Age: 71
End: 2021-12-02
Payer: MEDICARE

## 2021-12-02 ENCOUNTER — OFFICE VISIT (OUTPATIENT)
Dept: URGENT CARE | Facility: CLINIC | Age: 71
End: 2021-12-02
Payer: MEDICARE

## 2021-12-02 VITALS
RESPIRATION RATE: 16 BRPM | DIASTOLIC BLOOD PRESSURE: 92 MMHG | WEIGHT: 165 LBS | HEART RATE: 77 BPM | TEMPERATURE: 99 F | SYSTOLIC BLOOD PRESSURE: 146 MMHG | OXYGEN SATURATION: 96 % | HEIGHT: 66 IN | BODY MASS INDEX: 26.52 KG/M2

## 2021-12-02 DIAGNOSIS — E66.9 OBESITY (BMI 30-39.9): ICD-10-CM

## 2021-12-02 DIAGNOSIS — E55.9 VITAMIN D DEFICIENCY: ICD-10-CM

## 2021-12-02 DIAGNOSIS — R73.03 PREDIABETES: ICD-10-CM

## 2021-12-02 DIAGNOSIS — E03.9 HYPOTHYROIDISM, UNSPECIFIED TYPE: ICD-10-CM

## 2021-12-02 DIAGNOSIS — Z00.00 ANNUAL PHYSICAL EXAM: Primary | ICD-10-CM

## 2021-12-02 DIAGNOSIS — H66.001 ACUTE SUPPURATIVE OTITIS MEDIA OF RIGHT EAR WITHOUT SPONTANEOUS RUPTURE OF TYMPANIC MEMBRANE, RECURRENCE NOT SPECIFIED: Primary | ICD-10-CM

## 2021-12-02 PROCEDURE — 99214 OFFICE O/P EST MOD 30 MIN: CPT | Mod: 25,S$GLB,, | Performed by: INTERNAL MEDICINE

## 2021-12-02 PROCEDURE — 96372 PR INJECTION,THERAP/PROPH/DIAG2ST, IM OR SUBCUT: ICD-10-PCS | Mod: S$GLB,,, | Performed by: INTERNAL MEDICINE

## 2021-12-02 PROCEDURE — 99214 PR OFFICE/OUTPT VISIT, EST, LEVL IV, 30-39 MIN: ICD-10-PCS | Mod: 25,S$GLB,, | Performed by: INTERNAL MEDICINE

## 2021-12-02 PROCEDURE — 96372 THER/PROPH/DIAG INJ SC/IM: CPT | Mod: S$GLB,,, | Performed by: INTERNAL MEDICINE

## 2021-12-02 RX ORDER — AMOXICILLIN AND CLAVULANATE POTASSIUM 875; 125 MG/1; MG/1
1 TABLET, FILM COATED ORAL EVERY 12 HOURS
Qty: 20 TABLET | Refills: 0 | Status: SHIPPED | OUTPATIENT
Start: 2021-12-02 | End: 2021-12-12

## 2021-12-02 RX ORDER — DEXAMETHASONE SODIUM PHOSPHATE 100 MG/10ML
10 INJECTION INTRAMUSCULAR; INTRAVENOUS
Status: COMPLETED | OUTPATIENT
Start: 2021-12-02 | End: 2021-12-02

## 2021-12-02 RX ADMIN — DEXAMETHASONE SODIUM PHOSPHATE 10 MG: 100 INJECTION INTRAMUSCULAR; INTRAVENOUS at 04:12

## 2022-01-27 ENCOUNTER — TELEPHONE (OUTPATIENT)
Dept: INTERNAL MEDICINE | Facility: CLINIC | Age: 72
End: 2022-01-27
Payer: MEDICARE

## 2022-01-27 DIAGNOSIS — E03.4 HYPOTHYROIDISM DUE TO ACQUIRED ATROPHY OF THYROID: ICD-10-CM

## 2022-01-27 NOTE — TELEPHONE ENCOUNTER
----- Message from Dorinda Childs sent at 1/27/2022 11:23 AM CST -----  Contact: Lucita pretty [phone]  Requesting an RX refill or new RX.  Is this a refill or new RX: refill  RX name and strength:   SYNTHROID 100 mcg tablet  Is this a 30 day or 90 day RX: 90 day   Patient advised that in the future they can use their [facility] account to request a refill?:  Patient advised that RX refills can take up to 72 hours?: yes  Pharmacy name and phone #  [facility] ([place]) - [address] - [address]   Phone: [phone]  Comments:

## 2022-02-07 ENCOUNTER — TELEPHONE (OUTPATIENT)
Dept: INTERNAL MEDICINE | Facility: CLINIC | Age: 72
End: 2022-02-07
Payer: MEDICARE

## 2022-02-07 NOTE — TELEPHONE ENCOUNTER
----- Message from Pankaj Bryant sent at 2/7/2022  8:21 AM CST -----  Contact: Alexus [phone]  Pt daughter is calling to speak with you about this patients upcoming appointment.Please advise
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
## 2022-02-09 ENCOUNTER — LAB VISIT (OUTPATIENT)
Dept: LAB | Facility: HOSPITAL | Age: 72
End: 2022-02-09
Attending: INTERNAL MEDICINE
Payer: MEDICARE

## 2022-02-09 ENCOUNTER — OFFICE VISIT (OUTPATIENT)
Dept: INTERNAL MEDICINE | Facility: CLINIC | Age: 72
End: 2022-02-09
Payer: MEDICARE

## 2022-02-09 VITALS
WEIGHT: 169.31 LBS | DIASTOLIC BLOOD PRESSURE: 76 MMHG | OXYGEN SATURATION: 97 % | BODY MASS INDEX: 27.21 KG/M2 | SYSTOLIC BLOOD PRESSURE: 120 MMHG | HEART RATE: 68 BPM | HEIGHT: 66 IN | TEMPERATURE: 98 F

## 2022-02-09 DIAGNOSIS — R41.3 OTHER AMNESIA: ICD-10-CM

## 2022-02-09 DIAGNOSIS — G31.84 MILD COGNITIVE IMPAIRMENT WITH MEMORY LOSS: ICD-10-CM

## 2022-02-09 DIAGNOSIS — Z82.0 FAMILY HISTORY OF ALZHEIMER'S DISEASE: ICD-10-CM

## 2022-02-09 DIAGNOSIS — G31.84 MILD COGNITIVE IMPAIRMENT WITH MEMORY LOSS: Primary | ICD-10-CM

## 2022-02-09 LAB
ALBUMIN SERPL BCP-MCNC: 4.3 G/DL (ref 3.5–5.2)
ALP SERPL-CCNC: 92 U/L (ref 55–135)
ALT SERPL W/O P-5'-P-CCNC: 14 U/L (ref 10–44)
ANION GAP SERPL CALC-SCNC: 10 MMOL/L (ref 8–16)
AST SERPL-CCNC: 16 U/L (ref 10–40)
BASOPHILS # BLD AUTO: 0.05 K/UL (ref 0–0.2)
BASOPHILS NFR BLD: 0.4 % (ref 0–1.9)
BILIRUB SERPL-MCNC: 0.5 MG/DL (ref 0.1–1)
BUN SERPL-MCNC: 14 MG/DL (ref 8–23)
CALCIUM SERPL-MCNC: 10.8 MG/DL (ref 8.7–10.5)
CHLORIDE SERPL-SCNC: 107 MMOL/L (ref 95–110)
CO2 SERPL-SCNC: 22 MMOL/L (ref 23–29)
CREAT SERPL-MCNC: 0.8 MG/DL (ref 0.5–1.4)
DIFFERENTIAL METHOD: ABNORMAL
EOSINOPHIL # BLD AUTO: 0.1 K/UL (ref 0–0.5)
EOSINOPHIL NFR BLD: 0.6 % (ref 0–8)
ERYTHROCYTE [DISTWIDTH] IN BLOOD BY AUTOMATED COUNT: 13 % (ref 11.5–14.5)
EST. GFR  (AFRICAN AMERICAN): >60 ML/MIN/1.73 M^2
EST. GFR  (NON AFRICAN AMERICAN): >60 ML/MIN/1.73 M^2
FOLATE SERPL-MCNC: 13.3 NG/ML (ref 4–24)
GLUCOSE SERPL-MCNC: 100 MG/DL (ref 70–110)
HCT VFR BLD AUTO: 47 % (ref 37–48.5)
HGB BLD-MCNC: 15.1 G/DL (ref 12–16)
IMM GRANULOCYTES # BLD AUTO: 0.04 K/UL (ref 0–0.04)
IMM GRANULOCYTES NFR BLD AUTO: 0.4 % (ref 0–0.5)
LYMPHOCYTES # BLD AUTO: 1.4 K/UL (ref 1–4.8)
LYMPHOCYTES NFR BLD: 12.7 % (ref 18–48)
MCH RBC QN AUTO: 28 PG (ref 27–31)
MCHC RBC AUTO-ENTMCNC: 32.1 G/DL (ref 32–36)
MCV RBC AUTO: 87 FL (ref 82–98)
MONOCYTES # BLD AUTO: 0.7 K/UL (ref 0.3–1)
MONOCYTES NFR BLD: 6.5 % (ref 4–15)
NEUTROPHILS # BLD AUTO: 9 K/UL (ref 1.8–7.7)
NEUTROPHILS NFR BLD: 79.4 % (ref 38–73)
NRBC BLD-RTO: 0 /100 WBC
PLATELET # BLD AUTO: 304 K/UL (ref 150–450)
PMV BLD AUTO: 9.8 FL (ref 9.2–12.9)
POTASSIUM SERPL-SCNC: 4.4 MMOL/L (ref 3.5–5.1)
PROT SERPL-MCNC: 7.2 G/DL (ref 6–8.4)
RBC # BLD AUTO: 5.39 M/UL (ref 4–5.4)
SODIUM SERPL-SCNC: 139 MMOL/L (ref 136–145)
TSH SERPL DL<=0.005 MIU/L-ACNC: 1.96 UIU/ML (ref 0.4–4)
VIT B12 SERPL-MCNC: 333 PG/ML (ref 210–950)
WBC # BLD AUTO: 11.3 K/UL (ref 3.9–12.7)

## 2022-02-09 PROCEDURE — 84443 ASSAY THYROID STIM HORMONE: CPT | Mod: HCNC | Performed by: INTERNAL MEDICINE

## 2022-02-09 PROCEDURE — 99214 OFFICE O/P EST MOD 30 MIN: CPT | Mod: HCNC,S$GLB,, | Performed by: INTERNAL MEDICINE

## 2022-02-09 PROCEDURE — 1126F PR PAIN SEVERITY QUANTIFIED, NO PAIN PRESENT: ICD-10-PCS | Mod: HCNC,CPTII,S$GLB, | Performed by: INTERNAL MEDICINE

## 2022-02-09 PROCEDURE — 36415 COLL VENOUS BLD VENIPUNCTURE: CPT | Mod: HCNC,PO | Performed by: INTERNAL MEDICINE

## 2022-02-09 PROCEDURE — 1160F PR REVIEW ALL MEDS BY PRESCRIBER/CLIN PHARMACIST DOCUMENTED: ICD-10-PCS | Mod: HCNC,CPTII,S$GLB, | Performed by: INTERNAL MEDICINE

## 2022-02-09 PROCEDURE — 86592 SYPHILIS TEST NON-TREP QUAL: CPT | Mod: HCNC | Performed by: INTERNAL MEDICINE

## 2022-02-09 PROCEDURE — 82300 ASSAY OF CADMIUM: CPT | Mod: HCNC | Performed by: INTERNAL MEDICINE

## 2022-02-09 PROCEDURE — 3288F FALL RISK ASSESSMENT DOCD: CPT | Mod: HCNC,CPTII,S$GLB, | Performed by: INTERNAL MEDICINE

## 2022-02-09 PROCEDURE — 1101F PT FALLS ASSESS-DOCD LE1/YR: CPT | Mod: HCNC,CPTII,S$GLB, | Performed by: INTERNAL MEDICINE

## 2022-02-09 PROCEDURE — 82746 ASSAY OF FOLIC ACID SERUM: CPT | Mod: HCNC | Performed by: INTERNAL MEDICINE

## 2022-02-09 PROCEDURE — 1101F PR PT FALLS ASSESS DOC 0-1 FALLS W/OUT INJ PAST YR: ICD-10-PCS | Mod: HCNC,CPTII,S$GLB, | Performed by: INTERNAL MEDICINE

## 2022-02-09 PROCEDURE — 99999 PR PBB SHADOW E&M-EST. PATIENT-LVL V: CPT | Mod: PBBFAC,HCNC,, | Performed by: INTERNAL MEDICINE

## 2022-02-09 PROCEDURE — 3074F PR MOST RECENT SYSTOLIC BLOOD PRESSURE < 130 MM HG: ICD-10-PCS | Mod: HCNC,CPTII,S$GLB, | Performed by: INTERNAL MEDICINE

## 2022-02-09 PROCEDURE — 3008F PR BODY MASS INDEX (BMI) DOCUMENTED: ICD-10-PCS | Mod: HCNC,CPTII,S$GLB, | Performed by: INTERNAL MEDICINE

## 2022-02-09 PROCEDURE — 1159F MED LIST DOCD IN RCRD: CPT | Mod: HCNC,CPTII,S$GLB, | Performed by: INTERNAL MEDICINE

## 2022-02-09 PROCEDURE — 3288F PR FALLS RISK ASSESSMENT DOCUMENTED: ICD-10-PCS | Mod: HCNC,CPTII,S$GLB, | Performed by: INTERNAL MEDICINE

## 2022-02-09 PROCEDURE — 1159F PR MEDICATION LIST DOCUMENTED IN MEDICAL RECORD: ICD-10-PCS | Mod: HCNC,CPTII,S$GLB, | Performed by: INTERNAL MEDICINE

## 2022-02-09 PROCEDURE — 82607 VITAMIN B-12: CPT | Mod: HCNC | Performed by: INTERNAL MEDICINE

## 2022-02-09 PROCEDURE — 80053 COMPREHEN METABOLIC PANEL: CPT | Mod: HCNC | Performed by: INTERNAL MEDICINE

## 2022-02-09 PROCEDURE — 86038 ANTINUCLEAR ANTIBODIES: CPT | Mod: HCNC | Performed by: INTERNAL MEDICINE

## 2022-02-09 PROCEDURE — 99214 PR OFFICE/OUTPT VISIT, EST, LEVL IV, 30-39 MIN: ICD-10-PCS | Mod: HCNC,S$GLB,, | Performed by: INTERNAL MEDICINE

## 2022-02-09 PROCEDURE — 3008F BODY MASS INDEX DOCD: CPT | Mod: HCNC,CPTII,S$GLB, | Performed by: INTERNAL MEDICINE

## 2022-02-09 PROCEDURE — 99999 PR PBB SHADOW E&M-EST. PATIENT-LVL V: ICD-10-PCS | Mod: PBBFAC,HCNC,, | Performed by: INTERNAL MEDICINE

## 2022-02-09 PROCEDURE — 3078F DIAST BP <80 MM HG: CPT | Mod: HCNC,CPTII,S$GLB, | Performed by: INTERNAL MEDICINE

## 2022-02-09 PROCEDURE — 1160F RVW MEDS BY RX/DR IN RCRD: CPT | Mod: HCNC,CPTII,S$GLB, | Performed by: INTERNAL MEDICINE

## 2022-02-09 PROCEDURE — 3074F SYST BP LT 130 MM HG: CPT | Mod: HCNC,CPTII,S$GLB, | Performed by: INTERNAL MEDICINE

## 2022-02-09 PROCEDURE — 1126F AMNT PAIN NOTED NONE PRSNT: CPT | Mod: HCNC,CPTII,S$GLB, | Performed by: INTERNAL MEDICINE

## 2022-02-09 PROCEDURE — 87389 HIV-1 AG W/HIV-1&-2 AB AG IA: CPT | Mod: HCNC | Performed by: INTERNAL MEDICINE

## 2022-02-09 PROCEDURE — 84425 ASSAY OF VITAMIN B-1: CPT | Mod: HCNC | Performed by: INTERNAL MEDICINE

## 2022-02-09 PROCEDURE — 3078F PR MOST RECENT DIASTOLIC BLOOD PRESSURE < 80 MM HG: ICD-10-PCS | Mod: HCNC,CPTII,S$GLB, | Performed by: INTERNAL MEDICINE

## 2022-02-09 PROCEDURE — 85025 COMPLETE CBC W/AUTO DIFF WBC: CPT | Mod: HCNC | Performed by: INTERNAL MEDICINE

## 2022-02-09 PROCEDURE — 86255 FLUORESCENT ANTIBODY SCREEN: CPT | Mod: 59,HCNC | Performed by: INTERNAL MEDICINE

## 2022-02-09 NOTE — PROGRESS NOTES
One refill provided. Patient will be due for an office visit.  Tushar Díaz RN     Subjective:       Patient ID: Lucita Saravia is a 72 y.o. female.    Chief Complaint: Anxiety, Discuss Stroke  (Pt Daughter says Family history of Strokes .), Short Term Memory Loss (Says becoming more Frequent forgetting things.), Ear Fullness (Both ears ), Arm Bruises (Both of pt arms bruise marks or dark.), and Referral    HPI   Pt here with her daughter for evaluation of worsening short term memory loss which has has been slowly progressing over the last year or so. She is able to perform her ADL. But per daughter the short term memory loss waxes and wanes. She has good days and them some bad days. Pt has put her keys in the microwave as an example or been unable to find her phone fore days. No known hx of CVA/TIA. Pt's mother and sister had Alzheimer's disease. Both have passed.   Review of Systems   Constitutional: Negative for activity change, appetite change, chills, diaphoresis, fatigue, fever and unexpected weight change.   HENT: Negative for postnasal drip, rhinorrhea, sinus pressure/congestion, sneezing, sore throat, trouble swallowing and voice change.    Respiratory: Negative for cough, shortness of breath and wheezing.    Cardiovascular: Negative for chest pain, palpitations and leg swelling.   Gastrointestinal: Negative for abdominal pain, blood in stool, constipation, diarrhea, nausea and vomiting.   Genitourinary: Negative for dysuria.   Musculoskeletal: Negative for arthralgias and myalgias.   Integumentary:  Negative for rash and wound.   Allergic/Immunologic: Negative for environmental allergies and food allergies.   Hematological: Negative for adenopathy. Does not bruise/bleed easily.   Psychiatric/Behavioral: Positive for confusion and decreased concentration.         Objective:      Physical Exam  Constitutional:       General: She is not in acute distress.     Appearance: She is well-developed and well-nourished. She is not diaphoretic.   HENT:      Head: Normocephalic and atraumatic.       Right Ear: External ear normal.      Left Ear: External ear normal.      Nose: Nose normal.      Mouth/Throat:      Mouth: Oropharynx is clear and moist.      Pharynx: No oropharyngeal exudate.   Eyes:      General: No scleral icterus.        Right eye: No discharge.         Left eye: No discharge.      Extraocular Movements: EOM normal.      Conjunctiva/sclera: Conjunctivae normal.      Pupils: Pupils are equal, round, and reactive to light.   Neck:      Vascular: No JVD.   Cardiovascular:      Rate and Rhythm: Normal rate and regular rhythm.      Pulses: Intact distal pulses.      Heart sounds: Normal heart sounds.   Pulmonary:      Effort: Pulmonary effort is normal. No respiratory distress.      Breath sounds: Normal breath sounds. No wheezing or rales.   Musculoskeletal:         General: No edema.      Cervical back: Neck supple.   Lymphadenopathy:      Cervical: No cervical adenopathy.   Skin:     General: Skin is warm and dry.      Coloration: Skin is not pale.      Findings: No rash.   Neurological:      General: No focal deficit present.      Mental Status: She is alert.      Cranial Nerves: No cranial nerve deficit.      Comments: AAOx2           Assessment:       Problem List Items Addressed This Visit        Neuro    Mild cognitive impairment with memory loss - Primary    Relevant Orders    Ambulatory referral/consult to Neurology    Ambulatory referral/consult to Neuropsychology    Vitamin B1    Vitamin B12    Folate    MRI Brain Without Contrast    BRIANDA    Paraneoplastic Autoantibody Evaulation, Serum    Heavy Metals Screen, Blood (Quantitative)    HIV 1/2 Ag/Ab (4th Gen)    TSH    RPR    CBC Auto Differential    Comprehensive Metabolic Panel      Other Visit Diagnoses     Other amnesia        Relevant Orders    Ambulatory referral/consult to Neuropsychology    Vitamin B1    Vitamin B12    Folate    MRI Brain Without Contrast    BRIANDA    Paraneoplastic Autoantibody Evaulation, Serum    Heavy Metals  Screen, Blood (Quantitative)    HIV 1/2 Ag/Ab (4th Gen)    TSH    RPR    CBC Auto Differential    Comprehensive Metabolic Panel    Family history of Alzheimer's disease              Plan:    MCI/anmesia/Fx of alzheimer's dz- referral to Neurology   -Check labs and MRI([facility]ia)

## 2022-02-10 ENCOUNTER — TELEPHONE (OUTPATIENT)
Dept: INTERNAL MEDICINE | Facility: CLINIC | Age: 72
End: 2022-02-10
Payer: MEDICARE

## 2022-02-10 LAB
ANA SER QL IF: NORMAL
ARSENIC BLD-MCNC: <1 NG/ML
CADMIUM BLD-MCNC: 0.2 NG/ML
CITY: NORMAL
COUNTY: NORMAL
GUARDIAN FIRST NAME: NORMAL
GUARDIAN LAST NAME: NORMAL
HIV 1+2 AB+HIV1 P24 AG SERPL QL IA: NEGATIVE
HOME PHONE: NORMAL
LEAD BLD-MCNC: 1.3 MCG/DL
MERCURY BLD-MCNC: <1 NG/ML
RACE: NORMAL
RPR SER QL: NORMAL
STATE: NORMAL
STREET ADDRESS: NORMAL
VENOUS/CAPILLARY: NORMAL
ZIP: NORMAL

## 2022-02-10 NOTE — TELEPHONE ENCOUNTER
----- Message from Marilyn Cha sent at 2/10/2022 12:49 PM CST -----  Contact: Pt [phone]  Patient would like to get medical advice.  Symptoms (please be specific):  Her ears feel warm and stuffed up   How long have you had these symptoms:   Would you like a call back, or a response through your [facility] portal?:   Phone  Pharmacy name and phone # [facility] ([place]) - [address]   Phone:  [phone] Fax:  [phone]    Comments:  It's worse today than yesterday when she saw you

## 2022-02-10 NOTE — TELEPHONE ENCOUNTER
I want her on OTC Xyzal/Flonase at night and can take Claritin in the morning   Can also take Aleve/Ibuprofen for pain  Will hold off on antibiotics for now unless you have fevers > 100.4

## 2022-02-10 NOTE — TELEPHONE ENCOUNTER
Pt stated her ears feel stuffy and she feels like she has a fever, no temp taken. She stated she mentioned it at apt yesterday but it is worse today

## 2022-02-15 ENCOUNTER — TELEPHONE (OUTPATIENT)
Dept: NEUROLOGY | Facility: CLINIC | Age: 72
End: 2022-02-15
Payer: MEDICARE

## 2022-02-16 LAB
AMPHIPHYSIN AB TITR SER: NEGATIVE TITER
CV2 IGG TITR SER: NEGATIVE TITER
GLIAL NUC TYPE 1 AB TITR SER: NEGATIVE TITER
HU1 AB TITR SER: NEGATIVE TITER
HU2 AB TITR SER IF: NEGATIVE TITER
HU3 AB TITR SER: NEGATIVE TITER
IMMUNOLOGIST REVIEW: NORMAL
NACHR AB SER-SCNC: 0 NMOL/L
PAVAL REFLEX TEST ADDED: NORMAL
PCA-1 AB TITR SER: NEGATIVE TITER
PCA-TR AB TITR SER: NEGATIVE TITER
PURKINJE CELL CYTOPLASMIC AB TYPE2: NEGATIVE TITER
VGCC-P/Q BIND AB SER-SCNC: 0 NMOL/L
VGKC AB SER-SCNC: 0 NMOL/L
VIT B1 BLD-MCNC: 87 UG/L (ref 38–122)

## 2022-02-17 ENCOUNTER — TELEPHONE (OUTPATIENT)
Dept: NEUROLOGY | Facility: CLINIC | Age: 72
End: 2022-02-17
Payer: MEDICARE

## 2022-02-22 ENCOUNTER — TELEPHONE (OUTPATIENT)
Dept: INTERNAL MEDICINE | Facility: CLINIC | Age: 72
End: 2022-02-22
Payer: MEDICARE

## 2022-02-22 DIAGNOSIS — E83.52 HYPERCALCEMIA: Primary | ICD-10-CM

## 2022-02-23 DIAGNOSIS — E03.4 HYPOTHYROIDISM DUE TO ACQUIRED ATROPHY OF THYROID: ICD-10-CM

## 2022-02-23 RX ORDER — LEVOTHYROXINE SODIUM 100 UG/1
100 TABLET ORAL EVERY MORNING
Qty: 90 TABLET | Refills: 0 | Status: SHIPPED | OUTPATIENT
Start: 2022-02-23 | End: 2022-05-12

## 2022-02-23 NOTE — TELEPHONE ENCOUNTER
No new care gaps identified.  Powered by [employer] by [facility] [employer]. Reference number: [id number].   2/23/2022 3:07:15 PM CST

## 2022-02-23 NOTE — TELEPHONE ENCOUNTER
----- Message from Addis Nolasco sent at 2/23/2022  2:35 PM CST -----  Contact: Pt Daughter/Alexus  [phone]  Calling to get test results.  Name of test (lab, x-ray): [facility]   Date of test: 2/9  Where was the test performed[facility]   Would you like a call back, or a response through your [facility] portal?:   call back   Comments: Pt daughter wants to know if the pt's SYNTHROID 100 mcg tablet is the correct dose after blood work and if so can you call in a refill.     Please call and advise

## 2022-02-25 ENCOUNTER — TELEPHONE (OUTPATIENT)
Dept: INTERNAL MEDICINE | Facility: CLINIC | Age: 72
End: 2022-02-25
Payer: MEDICARE

## 2022-02-25 NOTE — TELEPHONE ENCOUNTER
"Patient called stating she is in her "right mind" and does not want her daughter, Alexus, to discuss information without patient's knowledge. She sated she does not feel the need to have MRI that was ordered 2/9/22 and stated she believes her daughter made up the memory loss.  "

## 2022-02-25 NOTE — TELEPHONE ENCOUNTER
----- Message from Addis Nolasco sent at 2/25/2022  2:59 PM CST -----  Contact: Pt [phone]  Pt called and stated that she would like to speak to you but did not leave a message as to what it is about.     Please call and advise
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
## 2022-03-02 ENCOUNTER — HOSPITAL ENCOUNTER (OUTPATIENT)
Dept: RADIOLOGY | Facility: HOSPITAL | Age: 72
Discharge: HOME | End: 2022-03-02
Attending: INTERNAL MEDICINE
Payer: MEDICARE

## 2022-03-02 ENCOUNTER — OFFICE VISIT (OUTPATIENT)
Dept: NEUROLOGY | Facility: CLINIC | Age: 72
End: 2022-03-02
Payer: MEDICARE

## 2022-03-02 VITALS
BODY MASS INDEX: 27.46 KG/M2 | WEIGHT: 170.88 LBS | DIASTOLIC BLOOD PRESSURE: 83 MMHG | HEART RATE: 64 BPM | HEIGHT: 66 IN | SYSTOLIC BLOOD PRESSURE: 142 MMHG

## 2022-03-02 DIAGNOSIS — R41.3 OTHER AMNESIA: ICD-10-CM

## 2022-03-02 DIAGNOSIS — F02.80 ALZHEIMER'S DISEASE: ICD-10-CM

## 2022-03-02 DIAGNOSIS — G30.9 ALZHEIMER'S DISEASE: ICD-10-CM

## 2022-03-02 DIAGNOSIS — G31.84 MILD COGNITIVE IMPAIRMENT WITH MEMORY LOSS: ICD-10-CM

## 2022-03-02 PROCEDURE — 96116 NUBHVL XM PHYS/QHP 1ST HR: CPT | Mod: S$GLB,,, | Performed by: PSYCHIATRY & NEUROLOGY

## 2022-03-02 PROCEDURE — 96139 PSYCL/NRPSYC TST TECH EA: CPT | Mod: S$GLB,,, | Performed by: PSYCHIATRY & NEUROLOGY

## 2022-03-02 PROCEDURE — 96116 NUBHVL XM PHYS/QHP 1ST HR: CPT | Mod: PBBFAC | Performed by: PSYCHIATRY & NEUROLOGY

## 2022-03-02 PROCEDURE — 99213 OFFICE O/P EST LOW 20 MIN: CPT | Mod: PBBFAC,25

## 2022-03-02 PROCEDURE — 70551 MRI BRAIN STEM W/O DYE: CPT | Mod: 26,HCNC,, | Performed by: RADIOLOGY

## 2022-03-02 PROCEDURE — 70551 MRI BRAIN WITHOUT CONTRAST: ICD-10-PCS | Mod: 26,HCNC,, | Performed by: RADIOLOGY

## 2022-03-02 PROCEDURE — 99204 OFFICE O/P NEW MOD 45 MIN: CPT | Mod: S$GLB,,, | Performed by: NURSE PRACTITIONER

## 2022-03-02 PROCEDURE — 96116 PR NEUROBEHAVIORAL STATUS EXAM BY PSYCH/PHYS: ICD-10-PCS | Mod: S$GLB,,, | Performed by: PSYCHIATRY & NEUROLOGY

## 2022-03-02 PROCEDURE — 96139 PR PSYCH/NEUROPSYCH TEST ADMIN/SCORING, BY TECH, 2+ TESTS, EA ADDTL 30 MIN: ICD-10-PCS | Mod: S$GLB,,, | Performed by: PSYCHIATRY & NEUROLOGY

## 2022-03-02 PROCEDURE — 99204 PR OFFICE/OUTPT VISIT, NEW, LEVL IV, 45-59 MIN: ICD-10-PCS | Mod: S$GLB,,, | Performed by: PSYCHIATRY & NEUROLOGY

## 2022-03-02 PROCEDURE — 96132 NRPSYC TST EVAL PHYS/QHP 1ST: CPT | Mod: S$GLB,,, | Performed by: PSYCHIATRY & NEUROLOGY

## 2022-03-02 PROCEDURE — 96138 PR PSYCH/NEUROPSYCH TEST ADMIN/SCORING, BY TECH, 2+ TESTS, 1ST 30 MIN: ICD-10-PCS | Mod: S$GLB,,, | Performed by: PSYCHIATRY & NEUROLOGY

## 2022-03-02 PROCEDURE — 99204 OFFICE O/P NEW MOD 45 MIN: CPT | Mod: S$GLB,,, | Performed by: PSYCHIATRY & NEUROLOGY

## 2022-03-02 PROCEDURE — 99999 PR PBB SHADOW E&M-EST. PATIENT-LVL III: ICD-10-PCS | Mod: PBBFAC,,,

## 2022-03-02 PROCEDURE — 96138 PSYCL/NRPSYC TECH 1ST: CPT | Mod: S$GLB,,, | Performed by: PSYCHIATRY & NEUROLOGY

## 2022-03-02 PROCEDURE — 96132 PR NEUROPSYCHOLOGIC TEST EVAL SVCS, 1ST HR: ICD-10-PCS | Mod: S$GLB,,, | Performed by: PSYCHIATRY & NEUROLOGY

## 2022-03-02 PROCEDURE — 70551 MRI BRAIN STEM W/O DYE: CPT | Mod: TC,HCNC

## 2022-03-02 PROCEDURE — 99999 PR PBB SHADOW E&M-EST. PATIENT-LVL III: CPT | Mod: PBBFAC,,,

## 2022-03-02 PROCEDURE — 99204 PR OFFICE/OUTPT VISIT, NEW, LEVL IV, 45-59 MIN: ICD-10-PCS | Mod: S$GLB,,, | Performed by: NURSE PRACTITIONER
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
## 2022-03-04 ENCOUNTER — TELEPHONE (OUTPATIENT)
Dept: INTERNAL MEDICINE | Facility: CLINIC | Age: 72
End: 2022-03-04
Payer: MEDICARE

## 2022-03-04 DIAGNOSIS — E21.3 HYPERPARATHYROIDISM: Primary | ICD-10-CM

## 2022-03-08 PROBLEM — G30.9 ALZHEIMER'S DISEASE: Status: ACTIVE | Noted: 2022-02-09

## 2022-03-08 PROBLEM — F02.80 ALZHEIMER'S DISEASE: Status: ACTIVE | Noted: 2022-02-09

## 2022-03-08 NOTE — ASSESSMENT & PLAN NOTE
Level of Care: Ms. Saravia appears to require a higher level of care. Specifically, she will require oversight in medications, appointments, and finances. It is encouraging that she will likely transition to her daughter's house in the near future.     Future Care Planning: Establish advanced care directives and living will. Will meet with social work during feedback session.     Neuropsychiatric Symptoms: Monitor irritability as this may be a point of intervention in the future.     Driving: Do not resume driving.     Follow-up: Will return to clinic for results on 3/16. Follow-up in 6 months.

## 2022-03-16 ENCOUNTER — OFFICE VISIT (OUTPATIENT)
Dept: NEUROLOGY | Facility: CLINIC | Age: 72
End: 2022-03-16
Payer: MEDICARE

## 2022-03-16 ENCOUNTER — OUTPATIENT CASE MANAGEMENT (OUTPATIENT)
Dept: NEUROLOGY | Facility: CLINIC | Age: 72
End: 2022-03-16
Payer: MEDICARE

## 2022-03-16 VITALS
BODY MASS INDEX: 27.58 KG/M2 | HEIGHT: 66 IN | HEART RATE: 64 BPM | DIASTOLIC BLOOD PRESSURE: 85 MMHG | SYSTOLIC BLOOD PRESSURE: 149 MMHG

## 2022-03-16 DIAGNOSIS — F02.80 LATE ONSET ALZHEIMER'S DEMENTIA WITHOUT BEHAVIORAL DISTURBANCE: Primary | ICD-10-CM

## 2022-03-16 DIAGNOSIS — G30.1 LATE ONSET ALZHEIMER'S DEMENTIA WITHOUT BEHAVIORAL DISTURBANCE: Primary | ICD-10-CM

## 2022-03-16 PROCEDURE — 3008F BODY MASS INDEX DOCD: CPT | Mod: HCNC,CPTII,S$GLB, | Performed by: PSYCHIATRY & NEUROLOGY

## 2022-03-16 PROCEDURE — 99499 RISK ADDL DX/OHS AUDIT: ICD-10-PCS | Mod: S$GLB,,, | Performed by: NURSE PRACTITIONER

## 2022-03-16 PROCEDURE — 1159F MED LIST DOCD IN RCRD: CPT | Mod: HCNC,CPTII,S$GLB, | Performed by: PSYCHIATRY & NEUROLOGY

## 2022-03-16 PROCEDURE — 3288F PR FALLS RISK ASSESSMENT DOCUMENTED: ICD-10-PCS | Mod: HCNC,CPTII,S$GLB, | Performed by: PSYCHIATRY & NEUROLOGY

## 2022-03-16 PROCEDURE — 99499 UNLISTED E&M SERVICE: CPT | Mod: HCNC,S$GLB,, | Performed by: PSYCHIATRY & NEUROLOGY

## 2022-03-16 PROCEDURE — 99499 NO LOS: ICD-10-PCS | Mod: HCNC,S$GLB,, | Performed by: PSYCHIATRY & NEUROLOGY

## 2022-03-16 PROCEDURE — 1101F PR PT FALLS ASSESS DOC 0-1 FALLS W/OUT INJ PAST YR: ICD-10-PCS | Mod: HCNC,CPTII,S$GLB, | Performed by: PSYCHIATRY & NEUROLOGY

## 2022-03-16 PROCEDURE — 1126F AMNT PAIN NOTED NONE PRSNT: CPT | Mod: HCNC,CPTII,S$GLB, | Performed by: PSYCHIATRY & NEUROLOGY

## 2022-03-16 PROCEDURE — 99215 PR OFFICE/OUTPT VISIT, EST, LEVL V, 40-54 MIN: ICD-10-PCS | Mod: HCNC,S$GLB,, | Performed by: NURSE PRACTITIONER

## 2022-03-16 PROCEDURE — 1159F PR MEDICATION LIST DOCUMENTED IN MEDICAL RECORD: ICD-10-PCS | Mod: HCNC,CPTII,S$GLB, | Performed by: PSYCHIATRY & NEUROLOGY

## 2022-03-16 PROCEDURE — 99999 PR PBB SHADOW E&M-EST. PATIENT-LVL III: CPT | Mod: PBBFAC,HCNC,,

## 2022-03-16 PROCEDURE — 1160F PR REVIEW ALL MEDS BY PRESCRIBER/CLIN PHARMACIST DOCUMENTED: ICD-10-PCS | Mod: HCNC,CPTII,S$GLB, | Performed by: PSYCHIATRY & NEUROLOGY

## 2022-03-16 PROCEDURE — 3079F DIAST BP 80-89 MM HG: CPT | Mod: HCNC,CPTII,S$GLB, | Performed by: PSYCHIATRY & NEUROLOGY

## 2022-03-16 PROCEDURE — 99499 UNLISTED E&M SERVICE: CPT | Mod: S$GLB,,, | Performed by: NURSE PRACTITIONER

## 2022-03-16 PROCEDURE — 3079F PR MOST RECENT DIASTOLIC BLOOD PRESSURE 80-89 MM HG: ICD-10-PCS | Mod: HCNC,CPTII,S$GLB, | Performed by: PSYCHIATRY & NEUROLOGY

## 2022-03-16 PROCEDURE — 3077F PR MOST RECENT SYSTOLIC BLOOD PRESSURE >= 140 MM HG: ICD-10-PCS | Mod: HCNC,CPTII,S$GLB, | Performed by: PSYCHIATRY & NEUROLOGY

## 2022-03-16 PROCEDURE — 3008F PR BODY MASS INDEX (BMI) DOCUMENTED: ICD-10-PCS | Mod: HCNC,CPTII,S$GLB, | Performed by: PSYCHIATRY & NEUROLOGY

## 2022-03-16 PROCEDURE — 1160F RVW MEDS BY RX/DR IN RCRD: CPT | Mod: HCNC,CPTII,S$GLB, | Performed by: PSYCHIATRY & NEUROLOGY

## 2022-03-16 PROCEDURE — 99999 PR PBB SHADOW E&M-EST. PATIENT-LVL III: ICD-10-PCS | Mod: PBBFAC,HCNC,,

## 2022-03-16 PROCEDURE — 1126F PR PAIN SEVERITY QUANTIFIED, NO PAIN PRESENT: ICD-10-PCS | Mod: HCNC,CPTII,S$GLB, | Performed by: PSYCHIATRY & NEUROLOGY

## 2022-03-16 PROCEDURE — 1101F PT FALLS ASSESS-DOCD LE1/YR: CPT | Mod: HCNC,CPTII,S$GLB, | Performed by: PSYCHIATRY & NEUROLOGY

## 2022-03-16 PROCEDURE — 99215 OFFICE O/P EST HI 40 MIN: CPT | Mod: HCNC,S$GLB,, | Performed by: NURSE PRACTITIONER

## 2022-03-16 PROCEDURE — 3077F SYST BP >= 140 MM HG: CPT | Mod: HCNC,CPTII,S$GLB, | Performed by: PSYCHIATRY & NEUROLOGY

## 2022-03-16 PROCEDURE — 3288F FALL RISK ASSESSMENT DOCD: CPT | Mod: HCNC,CPTII,S$GLB, | Performed by: PSYCHIATRY & NEUROLOGY

## 2022-03-16 RX ORDER — DONEPEZIL HYDROCHLORIDE 5 MG/1
5 TABLET, FILM COATED ORAL DAILY
Qty: 30 TABLET | Refills: 3 | Status: SHIPPED | OUTPATIENT
Start: 2022-03-16 | End: 2022-04-11 | Stop reason: SDUPTHER

## 2022-03-16 NOTE — PROGRESS NOTES
NEUROPSYCHOLOGICAL EVALUATION - CONFIDENTIAL  FEEDBACK NOTE    On 3/16/2022, I provided Ms. Lucita Saravia and her daughter the neuropsychological evaluation results. Please see the full report for a comprehensive overview of the findings.     Ms. Saravia perseverated on the impact of her senior care throughout the appointment. She commented on senior care upwards of 10 times during the visit.     Zia Chavira Psy.D., ABPP  Board Certified in Clinical Neuropsychology  [facility] - Department of Neurology

## 2022-03-16 NOTE — PROGRESS NOTES
Name: Lucita Saravia  MRN: [id number]   CSN: [id number]      Date: 3-16-22      Referring physician:  No referring provider defined for this encounter.    Subjective:      Chief Complaint: results     History of Present Illness (HPI):    Lucita Saravia is a 72 y.o. right-handed female with pre-diabetes and hypothyroidism who presents today for a follow-up evaluation of Dementia and is accompanied by daughter, Alexus.       Ms. Saravia repeatedly says she feels people should not have to retire until age 81. She feels [facility] has been bad for her since she does not have to remember, cook or create recipes like she once did.       Review of Systems   All other systems reviewed and are negative.      Past Medical History: The patient  has a past medical history of Female infertility NEC, History of degenerative disc disease, and Hypothyroidism.    Social History: The patient  reports that she has never smoked. She has never used smokeless tobacco. She reports current alcohol use. She reports that she does not use drugs.    Family History: Their family history includes Alzheimer's disease in her mother and sister; COPD in her father; Cancer in her paternal grandmother and sister; Kidney nephrosis in her daughter; Stroke in her mother.    Allergies: Patient has no known allergies.     Meds:   Current Outpatient Medications on File Prior to Visit   Medication Sig Dispense Refill    b complex vitamins capsule Take 1 capsule by mouth once daily.      ERGOCALCIFEROL, VITAMIN D2, (VITAMIN D ORAL) Take 400 mg by mouth.      fish oil-omega-3 fatty acids 300-1,000 mg capsule Take 2 g by mouth once daily.      multivitamin capsule Take 1 capsule by mouth once daily.      SYNTHROID 100 mcg tablet Take 1 tablet (100 mcg total) by mouth every morning. 90 tablet 0    VIT C/VIT E/LUTEIN/MIN/OMEGA-3 (OCUVITE ORAL) Take by mouth.       No current facility-administered medications on file prior to visit.       Objective:     Physical  "Exam:    Vitals:    03/16/22 1508   BP: (!) 149/85   BP Location: Right arm   Patient Position: Sitting   Pulse: 64   Height: 5' 6" (1.676 m)     Body mass index is 27.58 kg/m².    Constitutional  Well-developed, well-nourished, appears stated age  Well groomed and appropriately dressed     Awake, alert, pleasant and talkative.   Repetitive.  Does not seem to have full insight of diagnosis, continuing to repeatedly discuss the problem with correction.    RR equal and unlabored.   Face symmetric.  Hearing intact to conversation.   Gait normal and steady.      Laboratory Results:  Lab Visit on 03/02/2022   Component Date Value Ref Range Status    PTH, Intact 03/02/2022 120.1 (A) 9.0 - 77.0 pg/mL Final    Ionized Calcium 03/02/2022 1.37  1.06 - 1.42 mmol/L Final   Lab Visit on 02/09/2022   Component Date Value Ref Range Status    Thiamine 02/09/2022 87  38 - 122 ug/L Final    Vitamin B-12 02/09/2022 333  210 - 950 pg/mL Final    Folate 02/09/2022 13.3  4.0 - 24.0 ng/mL Final    BRIANDA Screen 02/09/2022 Negative <1:80  Negative <1:80 Final    NMO Interpretive Comments 02/09/2022 SEE BELOW   Final    PAVAL KIMMIE-1, Serum 02/09/2022 Negative  <1:240 titer Final    PAVAL reflex test added 02/09/2022 None.   Final    PAVAL KIMMIE-2, Serum 02/09/2022 Negative  <1:240 titer Final    PAVAL KIMMIE-3, Serum 02/09/2022 Negative  <1:240 titer Final    PAVAL AGNA-1, Serum 02/09/2022 Negative  <1:240 titer Final    PAVAL, PCA-1, Serum 02/09/2022 Negative  <1:240 titer Final    Purkinje Cell Cytoplasmic Ab, Type* 02/09/2022 Negative  <1:240 titer Final    PAVAL, PCA-Tr, Serum 02/09/2022 Negative  <1:240 titer Final    PAVAL,  Amphiphysin Ab, Serum 02/09/2022 Negative  <1:240 titer Final    CRMP-5 IgG 02/09/2022 Negative  <1:240 titer Final    P/Q Type Calcium Channel Ab 02/09/2022 0.00  <=0.02 nmol/L Final    AChR Ganglionic Neuronal Ab 02/09/2022 0.00  <=0.02 nmol/L Final    Neuronal (V-G) K+ Channel Ab, Serum 02/09/2022 " 0.00  <=0.02 nmol/L Final    Arsenic 02/09/2022 <1  <13 ng/mL Final    Lead 02/09/2022 1.3  <5.0 mcg/dL Final    Cadmium 02/09/2022 0.2  <5.0 ng/mL Final    Mercury 02/09/2022 <1  <10 ng/mL Final    Venous/Capillary 02/09/2022 Venous   Final    Street Address 02/09/2022 Test Not Performed   Final    City 02/09/2022 Test Not Performed   Final    State 02/09/2022 Test Not Performed   Final    Zip 02/09/2022 Test Not Performed   Final    [facility] 02/09/2022 Test Not Performed   Final    Guardian First Name 02/09/2022 Test Not Performed   Final    Guardian Last Name 02/09/2022 Test Not Performed   Final    Home Phone 02/09/2022 Test Not Performed   Final    Race 02/09/2022 Test Not Performed   Final    HIV 1/2 Ag/Ab 02/09/2022 Negative  Negative Final    TSH 02/09/2022 1.958  0.400 - 4.000 uIU/mL Final    RPR 02/09/2022 Non-reactive  Non-reactive Final    WBC 02/09/2022 11.30  3.90 - 12.70 K/uL Final    RBC 02/09/2022 5.39  4.00 - 5.40 M/uL Final    Hemoglobin 02/09/2022 15.1  12.0 - 16.0 g/dL Final    Hematocrit 02/09/2022 47.0  37.0 - 48.5 % Final    MCV 02/09/2022 87  82 - 98 fL Final    MCH 02/09/2022 28.0  27.0 - 31.0 pg Final    MCHC 02/09/2022 32.1  32.0 - 36.0 g/dL Final    RDW 02/09/2022 13.0  11.5 - 14.5 % Final    Platelets 02/09/2022 304  150 - 450 K/uL Final    MPV 02/09/2022 9.8  9.2 - 12.9 fL Final    Immature Granulocytes 02/09/2022 0.4  0.0 - 0.5 % Final    Gran # (ANC) 02/09/2022 9.0 (A) 1.8 - 7.7 K/uL Final    Immature Grans (Abs) 02/09/2022 0.04  0.00 - 0.04 K/uL Final    Lymph # 02/09/2022 1.4  1.0 - 4.8 K/uL Final    Mono # 02/09/2022 0.7  0.3 - 1.0 K/uL Final    Eos # 02/09/2022 0.1  0.0 - 0.5 K/uL Final    Baso # 02/09/2022 0.05  0.00 - 0.20 K/uL Final    nRBC 02/09/2022 0  0 /100 WBC Final    Gran % 02/09/2022 79.4 (A) 38.0 - 73.0 % Final    Lymph % 02/09/2022 12.7 (A) 18.0 - 48.0 % Final    Mono % 02/09/2022 6.5  4.0 - 15.0 % Final    Eosinophil % 02/09/2022  0.6  0.0 - 8.0 % Final    Basophil % 02/09/2022 0.4  0.0 - 1.9 % Final    Differential Method 02/09/2022 Automated   Final    Sodium 02/09/2022 139  136 - 145 mmol/L Final    Potassium 02/09/2022 4.4  3.5 - 5.1 mmol/L Final    Chloride 02/09/2022 107  95 - 110 mmol/L Final    CO2 02/09/2022 22 (A) 23 - 29 mmol/L Final    Glucose 02/09/2022 100  70 - 110 mg/dL Final    BUN 02/09/2022 14  8 - 23 mg/dL Final    Creatinine 02/09/2022 0.8  0.5 - 1.4 mg/dL Final    Calcium 02/09/2022 10.8 (A) 8.7 - 10.5 mg/dL Final    Total Protein 02/09/2022 7.2  6.0 - 8.4 g/dL Final    Albumin 02/09/2022 4.3  3.5 - 5.2 g/dL Final    Total Bilirubin 02/09/2022 0.5  0.1 - 1.0 mg/dL Final    Alkaline Phosphatase 02/09/2022 92  55 - 135 U/L Final    AST 02/09/2022 16  10 - 40 U/L Final    ALT 02/09/2022 14  10 - 44 U/L Final    Anion Gap 02/09/2022 10  8 - 16 mmol/L Final    eGFR if African American 02/09/2022 >60.0  >60 mL/min/1.73 m^2 Final    eGFR if non African American 02/09/2022 >60.0  >60 mL/min/1.73 m^2 Final           Imaging:             Results for orders placed or performed during the hospital encounter of 03/02/22   MRI Brain Without Contrast    Narrative    EXAMINATION:  MRI BRAIN WITHOUT CONTRAST    CLINICAL HISTORY:  Memory loss;Mental status change, unknown cause; Mild cognitive impairment, so stated    TECHNIQUE:  Multiplanar multisequence MR imaging of the brain was performed without contrast.    COMPARISON:  None:    FINDINGS:  There is no evidence of hydrocephalus with widening of the ventricles and sulci presumably reflecting an element of diffuse volume loss.  There is volume loss of the hippocampus noted bilaterally.  No intracranial hemorrhage or acute infarct is identified.    A few scattered white matter lesions are nonspecific. No evidence of advanced chronic small vessel ischemic change. No evidence of prior cortical/territorial infarct.    The visualized sinuses are clear.  Minimal left  mastoid mucosal thickening.      Impression    No acute intracranial process.  No advanced ischemic changes.    Volume loss which also involves the hippocampus bilaterally.      Electronically signed by: Baldomero Miller  Date:    03/02/2022  Time:    13:49       Independent review of images:               Assessment and Plan     Late onset Alzheimer's dementia without behavioral disturbance  -     donepeziL (ARICEPT) 5 MG tablet; Take 1 tablet (5 mg total) by mouth once daily.  Dispense: 30 tablet; Refill: 3        Medical Decision Making:    Dr. Chavira and I met with Ms. Saravia and her daughter, Alexus.   Dr. Chavira reviewed results of cognitive evaluation and diagnosis.  Reviewed MRI brain report as well as brain images with them.  Discussed prognosis of Alzheimer's dementia.   Discussed treatment options, including goals of treatment and possible SE. Opt to trial donepezil. Discussed titration schedule.   If doing well on 5 mg, can increase to 10 mg daily after 4 weeks. Daughter will let me know.   Rx sent to pharmacy.  Call for problems/questions.      Follow up 6 mos.     I spent 42 minutes face-to-face with the patient and daughter with >50% of the time spent with counseling and education regarding:  - results of data, diagnosis, and recommendations stated above  - the prognosis of dementia  - risks and benefits of memory medications  - importance of diet and exercise    Ana Rosario, MOJGAN, NP-C  Division of Movement and Memory Disorders  [facility]  [phone]

## 2022-03-16 NOTE — PATIENT INSTRUCTIONS
Donepezil (Aricept) 5 mg is a medication used to treat memory. I have sent a Rx for Majoria if you decide to take.    If you decide to start this medication, take one tablet daily.   After 4 weeks, we can increase to 10 mg daily if you would like.     Let me know if you have any problems or questions.     Follow up 6 months.

## 2022-03-16 NOTE — PROGRESS NOTES
Social Work - Dementia Care Management:    Met briefly with pt and caregiver, estrella Vaughan, to introduce self and plan for a phone consult with estrella. Will call her 3/21/22, 3:00 p.m.

## 2022-03-21 ENCOUNTER — OUTPATIENT CASE MANAGEMENT (OUTPATIENT)
Dept: NEUROLOGY | Facility: CLINIC | Age: 72
End: 2022-03-21
Payer: MEDICARE

## 2022-03-21 NOTE — PROGRESS NOTES
Social Work - Dementia Care Management    Phoned caregiver, daughter Alexus, to follow up on clinic introduction of 3/16/22 and determine further needs.  Dtr stated she is still kind of digesting, not sure yet what might be needed and what to ask.  Currently pt lives alone and retains some independence; dtr is in process of having plans drawn up for an addition to her home in which pt will live.  Dtr visits pt daily, check medications.  Dtr aware of need to address Power of  documents.  Dtr concerned about neighbors perceptions and responses to pt's behaviors, and is drafting a letter to alert them.  Dtr with general questions about resources, in-home care, and day programs. Dtr interested in Dementia Education Series.    Have signed dtr up for Education Series.  Will email additional resources.

## 2022-04-04 ENCOUNTER — OUTPATIENT CASE MANAGEMENT (OUTPATIENT)
Dept: NEUROLOGY | Facility: CLINIC | Age: 72
End: 2022-04-04
Payer: MEDICARE

## 2022-04-04 NOTE — PROGRESS NOTES
Social Work - Dementia Care Management:     Haylee for Dementia Caregiver Support Group   Flier for Dementia Education Series   Flier for in-person Dementia Caregiver Support Group   Haylee for Finding Meaning and Hope video discussion series   Two general Tip Sheets   Technology for Caregivers Guide   Home Safety Checklist   Sitter list   [place] Planning for Incapacity Guide   Listing of legal services   Brochure and link for [facility] Adult Day Program   [employer] website   FCA website   Teepa Snow video info   Teepa Snow podcast site   Book recommendations and offers of complimentary copies    Dtr replied via email requesting books be mailed to her. Advised I would send via [employer]PS upon my arrival on campus on 4/6/22.    ADDENDUM:  Books mailed to dtr 4/6/22

## 2022-04-11 DIAGNOSIS — G30.1 LATE ONSET ALZHEIMER'S DEMENTIA WITHOUT BEHAVIORAL DISTURBANCE: ICD-10-CM

## 2022-04-11 DIAGNOSIS — F02.80 LATE ONSET ALZHEIMER'S DEMENTIA WITHOUT BEHAVIORAL DISTURBANCE: ICD-10-CM

## 2022-04-11 RX ORDER — DONEPEZIL HYDROCHLORIDE 5 MG/1
10 TABLET, FILM COATED ORAL DAILY
Qty: 30 TABLET | Refills: 5 | Status: SHIPPED | OUTPATIENT
Start: 2022-04-11 | End: 2022-04-13 | Stop reason: SDUPTHER

## 2022-04-11 NOTE — TELEPHONE ENCOUNTER
----- Message from Heather Alfred sent at 4/11/2022  7:54 AM CDT -----  Regarding: Refill Request/Dosage Inquiry  Pt s Daughter called about a refill for donepeziL (ARICEPT) 5 MG tablet and if the dosage could be up to 10mg?    [facility] - [address]      Requesting Call back number:[phone] Alexus

## 2022-04-12 ENCOUNTER — TELEPHONE (OUTPATIENT)
Dept: NEUROLOGY | Facility: CLINIC | Age: 72
End: 2022-04-12
Payer: MEDICARE

## 2022-04-12 NOTE — TELEPHONE ENCOUNTER
----- Message from Lilian Saavedra sent at 4/12/2022  5:02 PM CDT -----  Regarding: Refills  Contact: [phone]  RX Refill Request    Who Called: Alexus (Daughter)    Refill or New Rx: Refills    RX Name and Strength:donepeziL (ARICEPT) 5 MG tablet    How is the patient currently taking it? (ex. 1XDay):Sig - Route: Take 2 tablets (10 mg total) by mouth once daily. - Oral    Preferred Pharmacy with phone number:[facility] [address] - [address]    Local or Mail Order: Local    Ordering Provider:Ana Rosario NP    Would the patient rather a call back or a response via [facility]?  Call Back    Best Call Back Number:[phone]

## 2022-04-13 DIAGNOSIS — G30.1 LATE ONSET ALZHEIMER'S DEMENTIA WITHOUT BEHAVIORAL DISTURBANCE: ICD-10-CM

## 2022-04-13 DIAGNOSIS — F02.80 LATE ONSET ALZHEIMER'S DEMENTIA WITHOUT BEHAVIORAL DISTURBANCE: ICD-10-CM

## 2022-04-13 RX ORDER — DONEPEZIL HYDROCHLORIDE 10 MG/1
10 TABLET, FILM COATED ORAL DAILY
Qty: 30 TABLET | Refills: 5 | Status: SHIPPED | OUTPATIENT
Start: 2022-04-13 | End: 2022-10-03

## 2022-04-13 NOTE — TELEPHONE ENCOUNTER
"Spoke with Ms. Alexus, she states "Donepezil was sent for 5 mg, 2 times a day, 30 pills, it's not a 30 day supply, she normally gets a month supply". Ms. Alexus informed a message will be forward to Ana for review.   "

## 2022-05-12 DIAGNOSIS — E03.4 HYPOTHYROIDISM DUE TO ACQUIRED ATROPHY OF THYROID: ICD-10-CM

## 2022-05-12 RX ORDER — LEVOTHYROXINE SODIUM 100 UG/1
100 TABLET ORAL EVERY MORNING
Qty: 90 TABLET | Refills: 0 | Status: SHIPPED | OUTPATIENT
Start: 2022-05-12 | End: 2022-08-26 | Stop reason: SDUPTHER

## 2022-05-12 NOTE — TELEPHONE ENCOUNTER
Refill Routing Note   Medication(s) are not appropriate for processing by [facility] for the following reason(s):      - Patient has not been seen in over 15 months by PCP    [facility] action(s):  Defer          Medication reconciliation completed: No     Appointments  past 12m or future 3m with PCP    Date Provider   Last Visit   Visit date not found Tiny Davidson DO   Next Visit   Visit date not found Tiny Davidson DO   ED visits in past 90 days: 0        Note composed:11:56 AM 05/12/2022

## 2022-05-12 NOTE — TELEPHONE ENCOUNTER
No new care gaps identified.  [facility] Embedded Care Gaps. Reference number: [id number]. 5/12/2022   9:56:40 AM STONET

## 2022-08-26 DIAGNOSIS — E03.4 HYPOTHYROIDISM DUE TO ACQUIRED ATROPHY OF THYROID: ICD-10-CM

## 2022-08-26 NOTE — TELEPHONE ENCOUNTER
No new care gaps identified.  [facility] Embedded Care Gaps. Reference number: [id number]. 8/26/2022   3:42:35 PM CDT

## 2022-08-26 NOTE — TELEPHONE ENCOUNTER
Faxed request from [facility].    She last saw dr ross 2/2022 w/ labs.  Hasn't seen pcp dr gaspar since 11/2020.    Has another appt in December with dr ross.

## 2022-08-26 NOTE — TELEPHONE ENCOUNTER
Refill Routing Note   Medication(s) are not appropriate for processing by [facility] for the following reason(s):      - Patient has not been seen in over 15 months by PCP    [facility] action(s):  Route          Medication reconciliation completed: No     Appointments  past 12m or future 3m with PCP    Date Provider   Last Visit   2/9/2022 Zia Coburn, DO   Next Visit   12/14/2022 Zia Coburn, DO   ED visits in past 90 days: 0        Note composed:4:42 PM 08/26/2022

## 2022-08-27 RX ORDER — LEVOTHYROXINE SODIUM 100 UG/1
100 TABLET ORAL EVERY MORNING
Qty: 90 TABLET | Refills: 0 | Status: SHIPPED | OUTPATIENT
Start: 2022-08-27 | End: 2022-11-28

## 2022-08-31 DIAGNOSIS — Z78.0 MENOPAUSE: ICD-10-CM

## 2022-09-01 ENCOUNTER — PATIENT OUTREACH (OUTPATIENT)
Dept: ADMINISTRATIVE | Facility: HOSPITAL | Age: 72
End: 2022-09-01
Payer: MEDICARE

## 2022-10-10 ENCOUNTER — HOSPITAL ENCOUNTER (OUTPATIENT)
Dept: RADIOLOGY | Facility: CLINIC | Age: 72
Discharge: HOME | End: 2022-10-10
Attending: INTERNAL MEDICINE
Payer: MEDICARE

## 2022-10-10 DIAGNOSIS — Z78.0 MENOPAUSE: ICD-10-CM

## 2022-10-10 PROCEDURE — 77080 DEXA BONE DENSITY SPINE HIP: ICD-10-PCS | Mod: 26,,, | Performed by: INTERNAL MEDICINE

## 2022-10-10 PROCEDURE — 77080 DXA BONE DENSITY AXIAL: CPT | Mod: 26,,, | Performed by: INTERNAL MEDICINE

## 2022-10-10 PROCEDURE — 77080 DXA BONE DENSITY AXIAL: CPT | Mod: TC

## 2022-10-19 ENCOUNTER — TELEPHONE (OUTPATIENT)
Dept: INTERNAL MEDICINE | Facility: CLINIC | Age: 72
End: 2022-10-19
Payer: MEDICARE

## 2022-10-19 NOTE — TELEPHONE ENCOUNTER
----- Message from Christiana Mcintosh MD sent at 10/17/2022  3:42 PM CDT -----  Results released to patient portal.

## 2022-11-09 ENCOUNTER — OFFICE VISIT (OUTPATIENT)
Dept: NEUROLOGY | Facility: CLINIC | Age: 72
End: 2022-11-09
Payer: MEDICARE

## 2022-11-09 VITALS
HEART RATE: 56 BPM | BODY MASS INDEX: 25.94 KG/M2 | SYSTOLIC BLOOD PRESSURE: 143 MMHG | WEIGHT: 161.38 LBS | HEIGHT: 66 IN | DIASTOLIC BLOOD PRESSURE: 85 MMHG

## 2022-11-09 DIAGNOSIS — G30.9 ALZHEIMER'S DISEASE: ICD-10-CM

## 2022-11-09 DIAGNOSIS — F02.80 ALZHEIMER'S DISEASE: ICD-10-CM

## 2022-11-09 PROCEDURE — 99999 PR PBB SHADOW E&M-EST. PATIENT-LVL III: ICD-10-PCS | Mod: PBBFAC,,,

## 2022-11-09 PROCEDURE — 99215 PR OFFICE/OUTPT VISIT, EST, LEVL V, 40-54 MIN: ICD-10-PCS | Mod: S$GLB,,, | Performed by: NURSE PRACTITIONER

## 2022-11-09 PROCEDURE — 1160F RVW MEDS BY RX/DR IN RCRD: CPT | Mod: CPTII,S$GLB,, | Performed by: PSYCHIATRY & NEUROLOGY

## 2022-11-09 PROCEDURE — 99215 OFFICE O/P EST HI 40 MIN: CPT | Mod: S$GLB,,, | Performed by: NURSE PRACTITIONER

## 2022-11-09 PROCEDURE — 1159F PR MEDICATION LIST DOCUMENTED IN MEDICAL RECORD: ICD-10-PCS | Mod: CPTII,S$GLB,, | Performed by: PSYCHIATRY & NEUROLOGY

## 2022-11-09 PROCEDURE — 3079F PR MOST RECENT DIASTOLIC BLOOD PRESSURE 80-89 MM HG: ICD-10-PCS | Mod: CPTII,S$GLB,, | Performed by: PSYCHIATRY & NEUROLOGY

## 2022-11-09 PROCEDURE — 99499 NO LOS: ICD-10-PCS | Mod: S$GLB,,, | Performed by: PSYCHIATRY & NEUROLOGY

## 2022-11-09 PROCEDURE — 99999 PR PBB SHADOW E&M-EST. PATIENT-LVL III: CPT | Mod: PBBFAC,,,

## 2022-11-09 PROCEDURE — 3079F DIAST BP 80-89 MM HG: CPT | Mod: CPTII,S$GLB,, | Performed by: PSYCHIATRY & NEUROLOGY

## 2022-11-09 PROCEDURE — 3288F PR FALLS RISK ASSESSMENT DOCUMENTED: ICD-10-PCS | Mod: CPTII,S$GLB,, | Performed by: PSYCHIATRY & NEUROLOGY

## 2022-11-09 PROCEDURE — 1160F PR REVIEW ALL MEDS BY PRESCRIBER/CLIN PHARMACIST DOCUMENTED: ICD-10-PCS | Mod: CPTII,S$GLB,, | Performed by: PSYCHIATRY & NEUROLOGY

## 2022-11-09 PROCEDURE — 3008F PR BODY MASS INDEX (BMI) DOCUMENTED: ICD-10-PCS | Mod: CPTII,S$GLB,, | Performed by: PSYCHIATRY & NEUROLOGY

## 2022-11-09 PROCEDURE — 1101F PT FALLS ASSESS-DOCD LE1/YR: CPT | Mod: CPTII,S$GLB,, | Performed by: PSYCHIATRY & NEUROLOGY

## 2022-11-09 PROCEDURE — 96116 PR NEUROBEHAVIORAL STATUS EXAM BY PSYCH/PHYS: ICD-10-PCS | Mod: S$GLB,,, | Performed by: PSYCHIATRY & NEUROLOGY

## 2022-11-09 PROCEDURE — 99499 UNLISTED E&M SERVICE: CPT | Mod: S$GLB,,, | Performed by: PSYCHIATRY & NEUROLOGY

## 2022-11-09 PROCEDURE — 99499 UNLISTED E&M SERVICE: CPT | Mod: S$GLB,,, | Performed by: NURSE PRACTITIONER

## 2022-11-09 PROCEDURE — 99499 RISK ADDL DX/OHS AUDIT: ICD-10-PCS | Mod: S$GLB,,, | Performed by: NURSE PRACTITIONER

## 2022-11-09 PROCEDURE — 3077F SYST BP >= 140 MM HG: CPT | Mod: CPTII,S$GLB,, | Performed by: PSYCHIATRY & NEUROLOGY

## 2022-11-09 PROCEDURE — 1159F MED LIST DOCD IN RCRD: CPT | Mod: CPTII,S$GLB,, | Performed by: PSYCHIATRY & NEUROLOGY

## 2022-11-09 PROCEDURE — 1126F AMNT PAIN NOTED NONE PRSNT: CPT | Mod: CPTII,S$GLB,, | Performed by: PSYCHIATRY & NEUROLOGY

## 2022-11-09 PROCEDURE — 96116 NUBHVL XM PHYS/QHP 1ST HR: CPT | Mod: S$GLB,,, | Performed by: PSYCHIATRY & NEUROLOGY

## 2022-11-09 PROCEDURE — 1126F PR PAIN SEVERITY QUANTIFIED, NO PAIN PRESENT: ICD-10-PCS | Mod: CPTII,S$GLB,, | Performed by: PSYCHIATRY & NEUROLOGY

## 2022-11-09 PROCEDURE — 3008F BODY MASS INDEX DOCD: CPT | Mod: CPTII,S$GLB,, | Performed by: PSYCHIATRY & NEUROLOGY

## 2022-11-09 PROCEDURE — 3077F PR MOST RECENT SYSTOLIC BLOOD PRESSURE >= 140 MM HG: ICD-10-PCS | Mod: CPTII,S$GLB,, | Performed by: PSYCHIATRY & NEUROLOGY

## 2022-11-09 PROCEDURE — 1101F PR PT FALLS ASSESS DOC 0-1 FALLS W/OUT INJ PAST YR: ICD-10-PCS | Mod: CPTII,S$GLB,, | Performed by: PSYCHIATRY & NEUROLOGY

## 2022-11-09 PROCEDURE — 3288F FALL RISK ASSESSMENT DOCD: CPT | Mod: CPTII,S$GLB,, | Performed by: PSYCHIATRY & NEUROLOGY

## 2022-11-09 NOTE — PROGRESS NOTES
Name: Lucita Saravia  MRN: [id number]   CSN: [id number]      Date: 11-9-22      Referring physician:  Zia Chavira, PsyD  [address] [place] [address]    Subjective:      Chief Complaint: Follow up memory     History of Present Illness (HPI):    Lucita Saravia is a 72 y.o. right-handed female who presents today for a follow-up evaluation of Dementia, probable Alzheimer's and is accompanied by daughter, Alexus. Last seen in office 3-16-22. At that time, donepezil started.    Ms. Saravia was interviewed alone:  Doing great.  Nice house, great neighbors. Love my house.   Daughter has big back yard. Reminds me of the yard I had growing up.     Right now I have dirty hair.     Retired. Worked in  and at bank for years.   Now using my recipes at [employer].   Still cook. Didn't grow up in house that ate junk. Still don't eat junk.   Listen to this, my mother's name was Adriana and my father's last name was Viv.     What am I here for today? Getting results?     Think my biggest thing is my eyes. Was always near sighted. Had Lasix. Best thing I ever did. dont understand why anyone would wwounat to ear glasses aall the time. I evaristo thme to read.       Daughter, Alexus, interviewed alone:  Things ok  Have cameras in her house.  Alexus sets up her meds and she takes.  They have dinner about 5 nights a week. No issues with appetite.   No food preferences.  Still cooks some. Does fine with this.   No swallowing or choking issues.  S sleep seems to be ok from what she can tell.   Mood okay.   No issues with balance.   Never co pain, dizziness or headaches.     Review of Systems   Constitutional:  Negative for appetite change and unexpected weight change.   HENT:  Negative for trouble swallowing.    Respiratory:  Negative for cough and choking.    Gastrointestinal:  Negative for constipation and diarrhea.   Genitourinary:  Negative for difficulty urinating.   Musculoskeletal:  Negative for back pain, gait problem,  "myalgias and neck pain.   Neurological:  Negative for dizziness, tremors, light-headedness and headaches.   Psychiatric/Behavioral:  Negative for dysphoric mood and sleep disturbance. The patient is not nervous/anxious.      Past Medical History: The patient  has a past medical history of Female infertility NEC, History of degenerative disc disease, and Hypothyroidism.    Social History: The patient  reports that she has never smoked. She has never used smokeless tobacco. She reports current alcohol use. She reports that she does not use drugs.    Family History: Their family history includes Alzheimer's disease in her mother and sister; COPD in her father; Cancer in her paternal grandmother and sister; Kidney nephrosis in her daughter; Stroke in her mother.    Allergies: Patient has no known allergies.     Meds:   Current Outpatient Medications on File Prior to Visit   Medication Sig Dispense Refill    b complex vitamins capsule Take 1 capsule by mouth once daily.      donepeziL (ARICEPT) 10 MG tablet TAKE 1 TABLET (10 MG TOTAL) BY MOUTH ONCE DAILY. 30 tablet 5    ERGOCALCIFEROL, VITAMIN D2, (VITAMIN D ORAL) Take 400 mg by mouth.      fish oil-omega-3 fatty acids 300-1,000 mg capsule Take 2 g by mouth once daily.      multivitamin capsule Take 1 capsule by mouth once daily.      VIT C/VIT E/LUTEIN/MIN/OMEGA-3 (OCUVITE ORAL) Take by mouth.       No current facility-administered medications on file prior to visit.       Objective:     Physical Exam:    Vitals:    11/09/22 1340   BP: (!) 143/85   BP Location: Right arm   Patient Position: Sitting   Pulse: (!) 56   Weight: 73.2 kg (161 lb 6 oz)   Height: 5' 6" (1.676 m)     Body mass index is 26.05 kg/m².    Constitutional  Well-developed, well-nourished, appears stated age   Cardiovascular  no LE edema bilaterally     ..  Neurological    * Mental status     - Orientation Oriented to: person and situation     - Memory     Impaired     - Attention/concentration  Easily " distracted     - Language  tangential     - Fund of knowledge  Aware of current events     ..  * Cranial nerves       - CN II  PERRL, visual fields full to confrontation     - CN III, IV, VI  Extraocular movements full, normal pursuits and saccades     - CN V  Sensation V1 - V3 intact     - CN VII  Face strong and symmetric bilaterally     - CN VIII  Hearing intact to conversation     - CN IX, X  Palate raises midline and symmetric     - CN XI  SCM and trapezius 5/5 bilaterally     - CN XII  Tongue midline   * Motor  Muscle bulk normal, strength 5/5 throughout   * Coordination  No dysmetria with finger-to-nose    * Gait  See below.     ..  * Specialized movement exam  No hypophonic speech.    No facial masking.   No cogwheel rigidity.     No bradykinesia.   No tremor with rest, posture, kinesis, or intention.    No other dystonia, chorea, athetosis, myoclonus, or tics.   No motor impersistence.   Normal-based gait.   No shortened stride length.   No abnormal arm swing.                    Laboratory Results:  No visits with results within 3 Month(s) from this visit.   Latest known visit with results is:   Lab Visit on 03/02/2022   Component Date Value Ref Range Status    PTH, Intact 03/02/2022 120.1 (H)  9.0 - 77.0 pg/mL Final    Ionized Calcium 03/02/2022 1.37  1.06 - 1.42 mmol/L Final           Imaging:     Independent review of images:            Assessment and Plan       Results for orders placed or performed during the hospital encounter of 03/02/22   MRI Brain Without Contrast    Narrative    EXAMINATION:  MRI BRAIN WITHOUT CONTRAST    CLINICAL HISTORY:  Memory loss;Mental status change, unknown cause; Mild cognitive impairment, so stated    TECHNIQUE:  Multiplanar multisequence MR imaging of the brain was performed without contrast.    COMPARISON:  None:    FINDINGS:  There is no evidence of hydrocephalus with widening of the ventricles and sulci presumably reflecting an element of diffuse volume loss.  There  is volume loss of the hippocampus noted bilaterally.  No intracranial hemorrhage or acute infarct is identified.    A few scattered white matter lesions are nonspecific. No evidence of advanced chronic small vessel ischemic change. No evidence of prior cortical/territorial infarct.    The visualized sinuses are clear.  Minimal left mastoid mucosal thickening.      Impression    No acute intracranial process.  No advanced ischemic changes.    Volume loss which also involves the hippocampus bilaterally.      Electronically signed by: Baldomero Miller  Date:    03/02/2022  Time:    13:49           Assessment and Plan     Alzheimer's disease      Medical Decision Making:    Continue donepezil without change.   Discussed diagnosis and progression with daughter, Alexus. She seems to be managing well from a caregiver perspective at this point. No changes to current living situation anticipated at this point. Had considered a home addition or moving to accommodate Ms. Manjit but due to prices/interest rates, may consider moving in with Ms. Manjit.   If issues arise, particularly appetite, swallowing, mood, sleep or balance arise, let me know.   Follow up 6 mos, unless needed sooner.       I spent 63 minutes face-to-face with the patient with >50% of the time spent with counseling and education regarding:  - results of data, diagnosis, and recommendations stated above  - the prognosis of dementia  - importance of diet and exercise    Ana Rosario, MOJGAN, NP-C  Division of Movement and Memory Disorders  [facility]  [phone]
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
## 2022-11-10 ENCOUNTER — OUTPATIENT CASE MANAGEMENT (OUTPATIENT)
Dept: NEUROLOGY | Facility: CLINIC | Age: 72
End: 2022-11-10
Payer: MEDICARE

## 2022-11-10 NOTE — PROGRESS NOTES
Social Work - Dementia Care Management:    11/9/22:  Met with dtr briefly during clinic.  She updated on issues previously addressed - the plan to build living quarters on to her house for pt has been put on hold, due to multiple consructions delay issues. She is now exploring options such as purchasing a home that would have living quarters for pt.  Dtr has cameras set up. Has read books/resources, feels things are going well right now, comfortable with her management.  No additional needs identified at this time. Encouraged dtr to contact me as needed.

## 2023-02-07 DIAGNOSIS — Z00.00 ENCOUNTER FOR MEDICARE ANNUAL WELLNESS EXAM: ICD-10-CM

## 2023-02-09 DIAGNOSIS — Z00.00 ENCOUNTER FOR MEDICARE ANNUAL WELLNESS EXAM: ICD-10-CM

## 2023-02-22 ENCOUNTER — PATIENT OUTREACH (OUTPATIENT)
Dept: ADMINISTRATIVE | Facility: HOSPITAL | Age: 73
End: 2023-02-22
Payer: MEDICARE

## 2023-02-22 NOTE — PROGRESS NOTES
Health Maintenance Due   Topic Date Due    TETANUS VACCINE  Never done    Mammogram  Never done    Shingles Vaccine (1 of 2) Never done    Hemoglobin A1c (Prediabetes)  06/13/2020    COVID-19 Vaccine (3 - Booster for Pfizer series) 05/19/2021    Colorectal Cancer Screening  08/29/2021    Influenza Vaccine (1) Never done     Chart reviewed.   Immunizations: Reconciled  Orders placed: N/A  Upcoming appts to satisfy STEPHANIE topics: N/A

## 2023-04-21 ENCOUNTER — PATIENT MESSAGE (OUTPATIENT)
Dept: ADMINISTRATIVE | Facility: HOSPITAL | Age: 73
End: 2023-04-21
Payer: MEDICARE

## 2023-04-26 ENCOUNTER — PATIENT MESSAGE (OUTPATIENT)
Dept: INTERNAL MEDICINE | Facility: CLINIC | Age: 73
End: 2023-04-26
Payer: MEDICARE

## 2023-04-26 DIAGNOSIS — R73.03 PREDIABETES: ICD-10-CM

## 2023-04-26 DIAGNOSIS — Z00.00 ANNUAL PHYSICAL EXAM: Primary | ICD-10-CM

## 2023-04-26 DIAGNOSIS — E03.4 HYPOTHYROIDISM DUE TO ACQUIRED ATROPHY OF THYROID: ICD-10-CM

## 2023-05-04 DIAGNOSIS — H60.90 OTITIS EXTERNA, UNSPECIFIED CHRONICITY, UNSPECIFIED LATERALITY, UNSPECIFIED TYPE: ICD-10-CM

## 2023-05-04 RX ORDER — NEOMYCIN SULFATE, POLYMYXIN B SULFATE AND HYDROCORTISONE 10; 3.5; 1 MG/ML; MG/ML; [USP'U]/ML
3 SUSPENSION/ DROPS AURICULAR (OTIC) 4 TIMES DAILY
Qty: 10 ML | Refills: 0 | Status: SHIPPED | OUTPATIENT
Start: 2023-05-04 | End: 2023-05-05

## 2023-05-04 NOTE — TELEPHONE ENCOUNTER
----- Message from Mita Schaferdevon sent at 5/4/2023  1:55 PM CDT -----  Contact: patient @ [phone]  Good Afternoon  1MEDICALADVICE     Patient is calling for Medical Advice regarding:both ears stuff-up    How long has patient had these symptoms:today    Pharmacy name and phone#:  [facility] ([place]) - [place] - [address]  [address] [place] [address]  Phone: [phone] Fax: [phone]            Would like response via [employer]:  call     Comments:Patient would like the neomycin-polymyxin-hydrocortisone (CORTISPORIN) otic solution

## 2023-05-05 DIAGNOSIS — H60.90 OTITIS EXTERNA, UNSPECIFIED CHRONICITY, UNSPECIFIED LATERALITY, UNSPECIFIED TYPE: ICD-10-CM

## 2023-05-05 RX ORDER — NEOMYCIN SULFATE, POLYMYXIN B SULFATE AND HYDROCORTISONE 10; 3.5; 1 MG/ML; MG/ML; [USP'U]/ML
SUSPENSION/ DROPS AURICULAR (OTIC)
Qty: 10 ML | Refills: 0 | Status: SHIPPED | OUTPATIENT
Start: 2023-05-05 | End: 2023-08-29

## 2023-05-16 ENCOUNTER — PATIENT MESSAGE (OUTPATIENT)
Dept: INTERNAL MEDICINE | Facility: CLINIC | Age: 73
End: 2023-05-16
Payer: MEDICARE

## 2023-08-08 ENCOUNTER — PES CALL (OUTPATIENT)
Dept: ADMINISTRATIVE | Facility: CLINIC | Age: 73
End: 2023-08-08
Payer: MEDICARE

## 2023-08-15 NOTE — PROGRESS NOTES
[facility] Note    Chief Complaint      Chief Complaint   Patient presents with    Medicare AW       History of Present Illness      Lucita Saravia is a 73 y.o. female who presents today for   Chief Complaint   Patient presents with    Medicare AWV         Patient presents to clinic for her annual wellness visit required by Medicare.  She reports feeling well today.  There are no complaints at this time.  Patient is present with her daughter at this visit.       Review of Systems   All 12 systems otherwise negative.     Family History:  family history includes Alzheimer's disease in her mother and sister; COPD in her father; Cancer in her paternal grandmother and sister; Kidney nephrosis in her daughter; Stroke in her mother.   Family history was reviewed with patient.     Medications:  Outpatient Encounter Medications as of 8/29/2023   Medication Sig Dispense Refill    b complex vitamins capsule Take 1 capsule by mouth once daily.      donepeziL (ARICEPT) 10 MG tablet TAKE 1 TABLET (10 MG TOTAL) BY MOUTH ONCE DAILY. 30 tablet 5    ERGOCALCIFEROL, VITAMIN D2, (VITAMIN D ORAL) Take 400 mg by mouth.      fish oil-omega-3 fatty acids 300-1,000 mg capsule Take 2 g by mouth once daily.      multivitamin capsule Take 1 capsule by mouth once daily.      SYNTHROID 100 mcg tablet TAKE 1 TABLET (100 MCG TOTAL) BY MOUTH EVERY MORNING. 90 tablet 3    VIT C/VIT E/LUTEIN/MIN/OMEGA-3 (OCUVITE ORAL) Take by mouth.      neomycin-polymyxin-hydrocortisone (CORTISPORIN) 3.5-10,000-1 mg/mL-unit/mL-% otic suspension PLACE 3 DROPS INTO THE RIGHT EAR 4 (FOUR) TIMES DAILY. (Patient not taking: Reported on 8/29/2023) 10 mL 0     No facility-administered encounter medications on file as of 8/29/2023.     Review for Opioid Screening: Pt does not have Rx for Opioids    Review for Substance Use Disorders: Patient does not have a controlled substance abuse disorder.        Allergies:  Review of patient's allergies  "indicates:  No Known Allergies    Health Maintenance:  Health Maintenance   Topic Date Due    TETANUS VACCINE  08/29/2023 (Originally 1/31/1968)    Shingles Vaccine (1 of 2) 08/29/2024 (Originally 1/31/2001)    Mammogram  08/29/2024 (Originally 1/31/1968)    Lipid Panel  06/13/2024    DEXA Scan  10/10/2024    Hepatitis C Screening  Completed    Colorectal Cancer Screening  Discontinued     Health Maintenance Topics with due status: Not Due       Topic Last Completion Date    Lipid Panel 06/13/2019    DEXA Scan 10/10/2022    Influenza Vaccine Not Due       Physical Exam      Vital Signs  Temp: 98.3 °F (36.8 °C)  Pulse: (!) 58  SpO2: 96 %  BP: 124/80  BP Location: Right arm  Patient Position: Sitting  Height and Weight  Height: 5' 6" (167.6 cm)  Weight: 76.2 kg (167 lb 15.9 oz)  BSA (Calculated - sq m): 1.88 sq meters  BMI (Calculated): 27.1  Weight in (lb) to have BMI = 25: 154.6]    Physical Exam  Vitals reviewed.   Constitutional:       Appearance: Normal appearance. She is normal weight.   HENT:      Head: Normocephalic and atraumatic.      Nose: Nose normal.      Mouth/Throat:      Mouth: Mucous membranes are moist.      Pharynx: Oropharynx is clear.   Eyes:      Extraocular Movements: Extraocular movements intact.      Conjunctiva/sclera: Conjunctivae normal.      Pupils: Pupils are equal, round, and reactive to light.   Cardiovascular:      Rate and Rhythm: Normal rate and regular rhythm.      Pulses: Normal pulses.      Heart sounds: Normal heart sounds.   Pulmonary:      Effort: Pulmonary effort is normal.      Breath sounds: Normal breath sounds.   Musculoskeletal:         General: Normal range of motion.      Cervical back: Normal range of motion and neck supple.   Skin:     General: Skin is warm and dry.      Capillary Refill: Capillary refill takes less than 2 seconds.   Neurological:      General: No focal deficit present.      Mental Status: She is alert and oriented to person, place, and time. " Mental status is at baseline.   Psychiatric:         Mood and Affect: Mood normal.         Behavior: Behavior normal.         Thought Content: Thought content normal.         Judgment: Judgment normal.          Assessment/Plan     Lucita Saravia is a 73 y.o.female with:    Encounter for preventive health examination    Encounter for Medicare annual wellness exam  -     Ambulatory Referral/Consult to Enhanced Annual Wellness Visit (eAWV)        As above, continue current medications and maintain follow up with specialists.  Return to clinic as needed.    Greater than 50% of visit was spent face to face with patient.  All questions were answered to patient's satisfaction.           Cele Reese, DONTRELL-C  [facility]    I offered to discuss advanced care planning, including how to pick a person who would make decisions for you if you were unable to make them for yourself, called a health care power of , and what kind of decisions you might make such as use of life sustaining treatments such as ventilators and tube feeding when faced with a life limiting illness recorded on a living will that they will need to know. (How you want to be cared for as you near the end of your natural life)     X Patient is interested in learning more about how to make advanced directives.  I provided them paperwork and offered to discuss this with them.

## 2023-08-25 ENCOUNTER — PATIENT MESSAGE (OUTPATIENT)
Dept: PRIMARY CARE CLINIC | Facility: CLINIC | Age: 73
End: 2023-08-25
Payer: MEDICARE

## 2023-08-29 ENCOUNTER — OFFICE VISIT (OUTPATIENT)
Dept: PRIMARY CARE CLINIC | Facility: CLINIC | Age: 73
End: 2023-08-29
Payer: MEDICARE

## 2023-08-29 VITALS
TEMPERATURE: 98 F | BODY MASS INDEX: 27 KG/M2 | HEIGHT: 66 IN | OXYGEN SATURATION: 96 % | DIASTOLIC BLOOD PRESSURE: 80 MMHG | HEART RATE: 58 BPM | SYSTOLIC BLOOD PRESSURE: 124 MMHG | WEIGHT: 168 LBS

## 2023-08-29 DIAGNOSIS — Z00.00 ENCOUNTER FOR MEDICARE ANNUAL WELLNESS EXAM: ICD-10-CM

## 2023-08-29 DIAGNOSIS — Z00.00 ENCOUNTER FOR PREVENTIVE HEALTH EXAMINATION: Primary | ICD-10-CM

## 2023-08-29 PROCEDURE — G0439 PPPS, SUBSEQ VISIT: HCPCS | Mod: HCNC,S$GLB,, | Performed by: NURSE PRACTITIONER

## 2023-08-29 PROCEDURE — 3008F PR BODY MASS INDEX (BMI) DOCUMENTED: ICD-10-PCS | Mod: HCNC,CPTII,S$GLB, | Performed by: NURSE PRACTITIONER

## 2023-08-29 PROCEDURE — 3079F DIAST BP 80-89 MM HG: CPT | Mod: HCNC,CPTII,S$GLB, | Performed by: NURSE PRACTITIONER

## 2023-08-29 PROCEDURE — 3074F SYST BP LT 130 MM HG: CPT | Mod: HCNC,CPTII,S$GLB, | Performed by: NURSE PRACTITIONER

## 2023-08-29 PROCEDURE — 1101F PT FALLS ASSESS-DOCD LE1/YR: CPT | Mod: HCNC,CPTII,S$GLB, | Performed by: NURSE PRACTITIONER

## 2023-08-29 PROCEDURE — 1160F RVW MEDS BY RX/DR IN RCRD: CPT | Mod: HCNC,CPTII,S$GLB, | Performed by: NURSE PRACTITIONER

## 2023-08-29 PROCEDURE — 1101F PR PT FALLS ASSESS DOC 0-1 FALLS W/OUT INJ PAST YR: ICD-10-PCS | Mod: HCNC,CPTII,S$GLB, | Performed by: NURSE PRACTITIONER

## 2023-08-29 PROCEDURE — 99999 PR PBB SHADOW E&M-EST. PATIENT-LVL IV: CPT | Mod: PBBFAC,HCNC,, | Performed by: NURSE PRACTITIONER

## 2023-08-29 PROCEDURE — 99999 PR PBB SHADOW E&M-EST. PATIENT-LVL IV: ICD-10-PCS | Mod: PBBFAC,HCNC,, | Performed by: NURSE PRACTITIONER

## 2023-08-29 PROCEDURE — 1159F MED LIST DOCD IN RCRD: CPT | Mod: HCNC,CPTII,S$GLB, | Performed by: NURSE PRACTITIONER

## 2023-08-29 PROCEDURE — 1170F FXNL STATUS ASSESSED: CPT | Mod: HCNC,CPTII,S$GLB, | Performed by: NURSE PRACTITIONER

## 2023-08-29 PROCEDURE — 1160F PR REVIEW ALL MEDS BY PRESCRIBER/CLIN PHARMACIST DOCUMENTED: ICD-10-PCS | Mod: HCNC,CPTII,S$GLB, | Performed by: NURSE PRACTITIONER

## 2023-08-29 PROCEDURE — 3008F BODY MASS INDEX DOCD: CPT | Mod: HCNC,CPTII,S$GLB, | Performed by: NURSE PRACTITIONER

## 2023-08-29 PROCEDURE — 3079F PR MOST RECENT DIASTOLIC BLOOD PRESSURE 80-89 MM HG: ICD-10-PCS | Mod: HCNC,CPTII,S$GLB, | Performed by: NURSE PRACTITIONER

## 2023-08-29 PROCEDURE — 3288F FALL RISK ASSESSMENT DOCD: CPT | Mod: HCNC,CPTII,S$GLB, | Performed by: NURSE PRACTITIONER

## 2023-08-29 PROCEDURE — 1159F PR MEDICATION LIST DOCUMENTED IN MEDICAL RECORD: ICD-10-PCS | Mod: HCNC,CPTII,S$GLB, | Performed by: NURSE PRACTITIONER

## 2023-08-29 PROCEDURE — 1170F PR FUNCTIONAL STATUS ASSESSED: ICD-10-PCS | Mod: HCNC,CPTII,S$GLB, | Performed by: NURSE PRACTITIONER

## 2023-08-29 PROCEDURE — G0439 PR MEDICARE ANNUAL WELLNESS SUBSEQUENT VISIT: ICD-10-PCS | Mod: HCNC,S$GLB,, | Performed by: NURSE PRACTITIONER

## 2023-08-29 PROCEDURE — 3288F PR FALLS RISK ASSESSMENT DOCUMENTED: ICD-10-PCS | Mod: HCNC,CPTII,S$GLB, | Performed by: NURSE PRACTITIONER

## 2023-08-29 PROCEDURE — 3074F PR MOST RECENT SYSTOLIC BLOOD PRESSURE < 130 MM HG: ICD-10-PCS | Mod: HCNC,CPTII,S$GLB, | Performed by: NURSE PRACTITIONER

## 2023-08-29 NOTE — PATIENT INSTRUCTIONS
Counseling and Referral of Other Preventative  (Italic type indicates deductible and co-insurance are waived)    Patient Name: Lucita Saravia  Today's Date: 8/29/2023    Health Maintenance       Date Due Completion Date    Hemoglobin A1c (Prediabetes) 06/13/2020 6/13/2019    TETANUS VACCINE 08/29/2023 (Originally 1/31/1968) ---    Shingles Vaccine (1 of 2) 08/29/2024 (Originally 1/31/2001) ---    COVID-19 Vaccine (3 - Pfizer series) 08/29/2024 (Originally 5/19/2021) 3/24/2021    Mammogram 08/29/2024 (Originally 1/31/1968) ---    Influenza Vaccine (1) 09/01/2023 ---    Lipid Panel 06/13/2024 6/13/2019    DEXA Scan 10/10/2024 10/10/2022        No orders of the defined types were placed in this encounter.    The following information is provided to all patients.  This information is to help you find resources for any of the problems found today that may be affecting your health:                Living healthy guide: www.[facility].[place]      Understanding Diabetes: www.diabetes.org      Eating healthy: www.cdc.gov/healthyweight      CDC home safety checklist: www.cdc.gov/steadi/patient.html      Agency on Aging: www.goea.[place].gov      Alcoholics anonymous (AA): www.aa.org      Physical Activity: www.ozzie.nih.gov/ao2tggk      Tobacco use: www.quitwithusla.org     Counseling and Referral of Other Preventative  (Italic type indicates deductible and co-insurance are waived)    Patient Name: Lucita Saravia  Today's Date: 8/29/2023    Health Maintenance       Date Due Completion Date    Hemoglobin A1c (Prediabetes) 06/13/2020 6/13/2019    TETANUS VACCINE 08/29/2023 (Originally 1/31/1968) ---    Shingles Vaccine (1 of 2) 08/29/2024 (Originally 1/31/2001) ---    COVID-19 Vaccine (3 - Pfizer series) 08/29/2024 (Originally 5/19/2021) 3/24/2021    Mammogram 08/29/2024 (Originally 1/31/1968) ---    Influenza Vaccine (1) 09/01/2023 ---    Lipid Panel 06/13/2024 6/13/2019    DEXA Scan 10/10/2024 10/10/2022        No orders of the  defined types were placed in this encounter.    The following information is provided to all patients.  This information is to help you find resources for any of the problems found today that may be affecting your health:                Living healthy guide: www.[facility].[place].[facility]      Understanding Diabetes: www.diabetes.org      Eating healthy: www.cdc.gov/healthyweight      CDC home safety checklist: www.cdc.gov/steadi/patient.html      Agency on Aging: www.goea.[place].[facility]      Alcoholics anonymous (AA): www.aa.org      Physical Activity: www.ozzie.nih.gov/xl4wyts      Tobacco use: www.quitwithusla.org

## 2023-09-12 ENCOUNTER — TELEPHONE (OUTPATIENT)
Dept: INTERNAL MEDICINE | Facility: CLINIC | Age: 73
End: 2023-09-12
Payer: MEDICARE

## 2023-09-12 NOTE — TELEPHONE ENCOUNTER
----- Message from Nelynanette Esteban sent at 9/12/2023  4:27 PM CDT -----  Contact: [phone]  1MEDICALADVICE     Patient is calling for Medical Advice regarding:refill a prescription for her ears     How long has patient had these symptoms:1    Pharmacy name and phone#:  [facility] ([place]) - [place] - [address]  [address] [place] [address]  Phone: [phone] Fax: [phone]      Would like response via [employer]:  no     Comments:she states it was ear drops and she is asking if the dr can get these refilled for her please give return call

## 2023-10-04 DIAGNOSIS — F02.80 LATE ONSET ALZHEIMER'S DEMENTIA WITHOUT BEHAVIORAL DISTURBANCE: ICD-10-CM

## 2023-10-04 DIAGNOSIS — G30.1 LATE ONSET ALZHEIMER'S DEMENTIA WITHOUT BEHAVIORAL DISTURBANCE: ICD-10-CM

## 2023-10-04 RX ORDER — DONEPEZIL HYDROCHLORIDE 10 MG/1
10 TABLET, FILM COATED ORAL DAILY
Qty: 30 TABLET | Refills: 5 | Status: SHIPPED | OUTPATIENT
Start: 2023-10-04 | End: 2024-03-12

## 2023-10-18 ENCOUNTER — PATIENT MESSAGE (OUTPATIENT)
Dept: PRIMARY CARE CLINIC | Facility: CLINIC | Age: 73
End: 2023-10-18
Payer: MEDICARE

## 2023-12-06 ENCOUNTER — PATIENT OUTREACH (OUTPATIENT)
Dept: ADMINISTRATIVE | Facility: HOSPITAL | Age: 73
End: 2023-12-06
Payer: MEDICARE

## 2023-12-06 NOTE — PROGRESS NOTES
Chart review done.  updated. Immunizations reviewed & updated. Care Everywhere updated. Chart reviewed for [employer] Report.

## 2023-12-15 DIAGNOSIS — E03.4 HYPOTHYROIDISM DUE TO ACQUIRED ATROPHY OF THYROID: ICD-10-CM

## 2023-12-15 RX ORDER — LEVOTHYROXINE SODIUM 100 UG/1
100 TABLET ORAL EVERY MORNING
Qty: 90 TABLET | Refills: 3 | Status: CANCELLED | OUTPATIENT
Start: 2023-12-15

## 2023-12-18 ENCOUNTER — PATIENT MESSAGE (OUTPATIENT)
Dept: INTERNAL MEDICINE | Facility: CLINIC | Age: 73
End: 2023-12-18
Payer: MEDICARE

## 2023-12-18 ENCOUNTER — TELEPHONE (OUTPATIENT)
Dept: INTERNAL MEDICINE | Facility: CLINIC | Age: 73
End: 2023-12-18
Payer: MEDICARE

## 2023-12-18 NOTE — TELEPHONE ENCOUNTER
This am, I told daughter on phone we would have to see her Mother this weekend to handle.    Last seen 2/9/22

## 2023-12-18 NOTE — TELEPHONE ENCOUNTER
Last appt in our dept was 2/9/22 with dr ross  Must see a physician at least once a year for rx oversight.    I called the [phone] number and that is daughters number.  I told her msg I just sent on portal.    We changed address [place] [phone]  Her phone.   (Her phone is already primary number. )        We booked this Saturday for annual/ refills.  She will be bringing her.   Alexus his her name.

## 2023-12-18 NOTE — TELEPHONE ENCOUNTER
----- Message from Alexus Cervantes sent at 12/15/2023  4:42 PM CST -----  Contact: [phone]  Pt was told she needs to be seen before her medications are filled and there are no openings until March. Can you fill her medications until then or work her in to be seen? She states her daughter is a teacher with her masters degree and she hates to bother her and see the doctor. She states she does not understand why her medications just cannot be filled because it is very difficult for her daughter and she does not want to have to put her through that. Please call pt to discuss.     Pt repeated about six times that she is healthy, it is ridiculous that she needs to see the doctor for refills, her daughter is a teacher with her master's degree and she should not have to bother her with this b/c she is too busy to have to spend her time taking her to a doctors appointment.    SYNTHROID 100 mcg tablet    donepeziL (ARICEPT) 10 MG tablet     [facility] ([place]) - [place] - [address]  [address] [place] [address]  Phone: [phone] Fax: [phone]    Please help this patient.    Thanks

## 2023-12-23 ENCOUNTER — OFFICE VISIT (OUTPATIENT)
Dept: INTERNAL MEDICINE | Facility: CLINIC | Age: 73
End: 2023-12-23
Payer: MEDICARE

## 2023-12-23 VITALS
BODY MASS INDEX: 27.62 KG/M2 | OXYGEN SATURATION: 96 % | WEIGHT: 171.88 LBS | DIASTOLIC BLOOD PRESSURE: 80 MMHG | SYSTOLIC BLOOD PRESSURE: 128 MMHG | TEMPERATURE: 97 F | HEIGHT: 66 IN | HEART RATE: 69 BPM

## 2023-12-23 DIAGNOSIS — E03.9 HYPOTHYROIDISM, UNSPECIFIED TYPE: ICD-10-CM

## 2023-12-23 DIAGNOSIS — G30.9 ALZHEIMER'S DISEASE: ICD-10-CM

## 2023-12-23 DIAGNOSIS — F02.80 ALZHEIMER'S DISEASE: ICD-10-CM

## 2023-12-23 DIAGNOSIS — E21.3 HYPERPARATHYROIDISM: ICD-10-CM

## 2023-12-23 DIAGNOSIS — E03.4 HYPOTHYROIDISM DUE TO ACQUIRED ATROPHY OF THYROID: ICD-10-CM

## 2023-12-23 DIAGNOSIS — Z00.00 ANNUAL PHYSICAL EXAM: Primary | ICD-10-CM

## 2023-12-23 DIAGNOSIS — M81.0 OSTEOPOROSIS, UNSPECIFIED OSTEOPOROSIS TYPE, UNSPECIFIED PATHOLOGICAL FRACTURE PRESENCE: ICD-10-CM

## 2023-12-23 PROCEDURE — 1101F PR PT FALLS ASSESS DOC 0-1 FALLS W/OUT INJ PAST YR: ICD-10-PCS | Mod: HCNC,CPTII,S$GLB, | Performed by: INTERNAL MEDICINE

## 2023-12-23 PROCEDURE — 3074F PR MOST RECENT SYSTOLIC BLOOD PRESSURE < 130 MM HG: ICD-10-PCS | Mod: HCNC,CPTII,S$GLB, | Performed by: INTERNAL MEDICINE

## 2023-12-23 PROCEDURE — 3079F DIAST BP 80-89 MM HG: CPT | Mod: HCNC,CPTII,S$GLB, | Performed by: INTERNAL MEDICINE

## 2023-12-23 PROCEDURE — 1159F MED LIST DOCD IN RCRD: CPT | Mod: HCNC,CPTII,S$GLB, | Performed by: INTERNAL MEDICINE

## 2023-12-23 PROCEDURE — 90677 PNEUMOCOCCAL CONJUGATE VACCINE 20-VALENT: ICD-10-PCS | Mod: HCNC,S$GLB,, | Performed by: INTERNAL MEDICINE

## 2023-12-23 PROCEDURE — 1126F AMNT PAIN NOTED NONE PRSNT: CPT | Mod: HCNC,CPTII,S$GLB, | Performed by: INTERNAL MEDICINE

## 2023-12-23 PROCEDURE — 99397 PER PM REEVAL EST PAT 65+ YR: CPT | Mod: HCNC,S$GLB,, | Performed by: INTERNAL MEDICINE

## 2023-12-23 PROCEDURE — 3288F PR FALLS RISK ASSESSMENT DOCUMENTED: ICD-10-PCS | Mod: HCNC,CPTII,S$GLB, | Performed by: INTERNAL MEDICINE

## 2023-12-23 PROCEDURE — 90677 PCV20 VACCINE IM: CPT | Mod: HCNC,S$GLB,, | Performed by: INTERNAL MEDICINE

## 2023-12-23 PROCEDURE — 1160F RVW MEDS BY RX/DR IN RCRD: CPT | Mod: HCNC,CPTII,S$GLB, | Performed by: INTERNAL MEDICINE

## 2023-12-23 PROCEDURE — 1157F PR ADVANCE CARE PLAN OR EQUIV PRESENT IN MEDICAL RECORD: ICD-10-PCS | Mod: HCNC,CPTII,S$GLB, | Performed by: INTERNAL MEDICINE

## 2023-12-23 PROCEDURE — 1101F PT FALLS ASSESS-DOCD LE1/YR: CPT | Mod: HCNC,CPTII,S$GLB, | Performed by: INTERNAL MEDICINE

## 2023-12-23 PROCEDURE — 3079F PR MOST RECENT DIASTOLIC BLOOD PRESSURE 80-89 MM HG: ICD-10-PCS | Mod: HCNC,CPTII,S$GLB, | Performed by: INTERNAL MEDICINE

## 2023-12-23 PROCEDURE — G0009 PNEUMOCOCCAL CONJUGATE VACCINE 20-VALENT: ICD-10-PCS | Mod: HCNC,S$GLB,, | Performed by: INTERNAL MEDICINE

## 2023-12-23 PROCEDURE — 99397 PR PREVENTIVE VISIT,EST,65 & OVER: ICD-10-PCS | Mod: HCNC,S$GLB,, | Performed by: INTERNAL MEDICINE

## 2023-12-23 PROCEDURE — 99999 PR PBB SHADOW E&M-EST. PATIENT-LVL III: ICD-10-PCS | Mod: PBBFAC,HCNC,, | Performed by: INTERNAL MEDICINE

## 2023-12-23 PROCEDURE — 3008F BODY MASS INDEX DOCD: CPT | Mod: HCNC,CPTII,S$GLB, | Performed by: INTERNAL MEDICINE

## 2023-12-23 PROCEDURE — 99999 PR PBB SHADOW E&M-EST. PATIENT-LVL III: CPT | Mod: PBBFAC,HCNC,, | Performed by: INTERNAL MEDICINE

## 2023-12-23 PROCEDURE — 1126F PR PAIN SEVERITY QUANTIFIED, NO PAIN PRESENT: ICD-10-PCS | Mod: HCNC,CPTII,S$GLB, | Performed by: INTERNAL MEDICINE

## 2023-12-23 PROCEDURE — G0009 ADMIN PNEUMOCOCCAL VACCINE: HCPCS | Mod: HCNC,S$GLB,, | Performed by: INTERNAL MEDICINE

## 2023-12-23 PROCEDURE — 1157F ADVNC CARE PLAN IN RCRD: CPT | Mod: HCNC,CPTII,S$GLB, | Performed by: INTERNAL MEDICINE

## 2023-12-23 PROCEDURE — 3288F FALL RISK ASSESSMENT DOCD: CPT | Mod: HCNC,CPTII,S$GLB, | Performed by: INTERNAL MEDICINE

## 2023-12-23 PROCEDURE — 3074F SYST BP LT 130 MM HG: CPT | Mod: HCNC,CPTII,S$GLB, | Performed by: INTERNAL MEDICINE

## 2023-12-23 PROCEDURE — 1159F PR MEDICATION LIST DOCUMENTED IN MEDICAL RECORD: ICD-10-PCS | Mod: HCNC,CPTII,S$GLB, | Performed by: INTERNAL MEDICINE

## 2023-12-23 PROCEDURE — 1160F PR REVIEW ALL MEDS BY PRESCRIBER/CLIN PHARMACIST DOCUMENTED: ICD-10-PCS | Mod: HCNC,CPTII,S$GLB, | Performed by: INTERNAL MEDICINE

## 2023-12-23 PROCEDURE — 3008F PR BODY MASS INDEX (BMI) DOCUMENTED: ICD-10-PCS | Mod: HCNC,CPTII,S$GLB, | Performed by: INTERNAL MEDICINE

## 2023-12-23 RX ORDER — LEVOTHYROXINE SODIUM 100 UG/1
100 TABLET ORAL EVERY MORNING
Qty: 90 TABLET | Refills: 3 | Status: SHIPPED | OUTPATIENT
Start: 2023-12-23

## 2023-12-23 RX ORDER — ALENDRONATE SODIUM 70 MG/1
TABLET ORAL
Qty: 12 TABLET | Refills: 3 | Status: SHIPPED | OUTPATIENT
Start: 2023-12-23

## 2023-12-23 NOTE — PROGRESS NOTES
Subjective     Patient ID: Lucita Saravia is a 73 y.o. female.    Chief Complaint: Annual Exam    HPI  73 y.o. Female here for annual exam.     Vaccines: Influenza (declined); Tetanus (2023); PNA (current); Shingrix (will consider)  Eye exam: declined  Mammogram: declined  Gyn exam: declined  Colonoscopy: declined  DEXA: 8/22    Exercise: walks  Diet: regular     Past Medical History:  No date: Alzheimer's disease, unspecified (CODE)  No date: Female infertility NEC  No date: History of degenerative disc disease      Comment:  neck  No date: Hyperparathyroidism, unspecified  No date: Hypothyroidism  Past Surgical History:  No date: APPENDECTOMY  No date: EYE SURGERY      Comment:  LASIK  No date: LASIK  No date: PELVIC LAPAROSCOPY      Comment:  fertillity  No date: TONSILLECTOMY, ADENOIDECTOMY  No date: TOTAL ABDOMINAL HYSTERECTOMY      Comment:  endometrisosis  Social History    Socioeconomic History      Marital status:     Occupational History      Occupation: disability for neck pain    Tobacco Use      Smoking status: Never      Smokeless tobacco: Never    Substance and Sexual Activity      Alcohol use: Yes        Comment: rare      Drug use: No      Sexual activity: Not Currently    Social History Narrative      Single, lives alone, retired -but works PT serving at [employer]    Social Determinants of Health  Financial Resource Strain: Low Risk  (8/29/2023)      Overall Financial Resource Strain (CARDIA)          Difficulty of Paying Living Expenses: Not hard at all  Food Insecurity: No Food Insecurity (8/29/2023)      Hunger Vital Sign          Worried About Running Out of Food in the Last Year: Never true          Ran Out of Food in the Last Year: Never true  Transportation Needs: No Transportation Needs (8/29/2023)      PRAPARE - Transportation          Lack of Transportation (Medical): No          Lack of Transportation (Non-Medical): No  Physical Activity: Insufficiently Active (8/29/2023)       Exercise Vital Sign          Days of Exercise per Week: 2 days          Minutes of Exercise per Session: 40 min  Stress: No Stress Concern Present (8/29/2023)      [employer] - Occupational Stress Questionnaire          Feeling of Stress : Not at all  Social Connections: Moderately Isolated (8/29/2023)      Social Connection and Isolation Panel [NHANES]          Frequency of Communication with Friends and Family: More than three times a week          Frequency of Social Gatherings with Friends and Family: More than three times a week          Attends Mandaeism Services: 1 to 4 times per year          Active Member of Clubs or Organizations: No          Attends Club or Organization Meetings: Never          Marital Status:   Housing Stability: Unknown (8/29/2023)      Housing Stability Vital Sign          Unable to Pay for Housing in the Last Year: No          Unstable Housing in the Last Year: No  Review of patient's allergies indicates:  No Known Allergies  Lucita Saravia had no medications administered during this visit.  Review of Systems   Constitutional:  Negative for activity change, appetite change, chills, diaphoresis, fatigue, fever and unexpected weight change.   HENT:  Negative for nasal congestion, mouth sores, postnasal drip, rhinorrhea, sinus pressure/congestion, sneezing, sore throat, trouble swallowing and voice change.    Eyes:  Negative for pain, discharge and visual disturbance.   Respiratory:  Negative for cough, shortness of breath and wheezing.    Cardiovascular:  Negative for chest pain, palpitations and leg swelling.   Gastrointestinal:  Negative for abdominal pain, blood in stool, constipation, diarrhea, nausea and vomiting.   Endocrine: Negative for cold intolerance and heat intolerance.   Genitourinary:  Negative for difficulty urinating, dysuria, frequency, hematuria and urgency.   Musculoskeletal:  Negative for arthralgias and myalgias.    Integumentary:  Negative for rash and wound.   Allergic/Immunologic: Negative for environmental allergies and food allergies.   Neurological:  Positive for memory loss. Negative for dizziness, tremors, seizures, syncope, weakness, light-headedness and headaches.   Hematological:  Negative for adenopathy. Does not bruise/bleed easily.   Psychiatric/Behavioral:  Negative for confusion, self-injury, sleep disturbance and suicidal ideas. The patient is not nervous/anxious.           Objective     Physical Exam  Vitals and nursing note reviewed.   Constitutional:       General: She is not in acute distress.     Appearance: Normal appearance. She is well-developed. She is not diaphoretic.   HENT:      Head: Normocephalic and atraumatic.      Right Ear: External ear normal.      Left Ear: External ear normal.      Nose: Nose normal.      Mouth/Throat:      Pharynx: No oropharyngeal exudate.   Eyes:      General: No scleral icterus.        Right eye: No discharge.         Left eye: No discharge.      Conjunctiva/sclera: Conjunctivae normal.      Pupils: Pupils are equal, round, and reactive to light.   Neck:      Thyroid: No thyromegaly.      Vascular: No JVD.   Cardiovascular:      Rate and Rhythm: Normal rate and regular rhythm.      Pulses: Normal pulses.      Heart sounds: Normal heart sounds. No murmur heard.  Pulmonary:      Effort: Pulmonary effort is normal. No respiratory distress.      Breath sounds: Normal breath sounds. No wheezing, rhonchi or rales.   Chest:      Chest wall: No tenderness.   Abdominal:      General: Bowel sounds are normal. There is no distension.      Palpations: Abdomen is soft.      Tenderness: There is no abdominal tenderness. There is no guarding or rebound.   Musculoskeletal:      Cervical back: Neck supple.      Right lower leg: No edema.      Left lower leg: No edema.   Lymphadenopathy:      Cervical: No cervical adenopathy.   Skin:     General: Skin is warm and dry.      Coloration:  Skin is not pale.      Findings: No rash.   Neurological:      General: No focal deficit present.      Mental Status: She is alert and oriented to person, place, and time.      Gait: Gait normal.   Psychiatric:         Behavior: Behavior normal.         Thought Content: Thought content normal.         Judgment: Judgment normal.            Assessment and Plan     1. Annual physical exam    2. Alzheimer's disease    3. Hyperparathyroidism  -     CALCIUM, IONIZED; Future; Expected date: 12/23/2023  -     Vitamin D; Future; Expected date: 12/23/2023  -     PTH, intact; Future; Expected date: 12/23/2023    4. Hypothyroidism, unspecified type    5. Hypothyroidism due to acquired atrophy of thyroid  -     SYNTHROID 100 mcg tablet; Take 1 tablet (100 mcg total) by mouth every morning.  Dispense: 90 tablet; Refill: 3    6. Osteoporosis, unspecified osteoporosis type, unspecified pathological fracture presence  -     alendronate (FOSAMAX) 70 MG tablet; Take 1 tablet once weekly in the morning with a full glass of water on an empty stomach. Do not consume food or lie down for at least 30 minutes afterwards.  Dispense: 12 tablet; Refill: 3        Blood work ordered     Alzheimer's dz- stable on Aricept    Hypothyroidism- stable on Synthroid(was out the last 3 days)    Hyperparathyroidism- stable, did not f/u with Endo as recommended     Osteoporosis- starting Fosamax    F/u in 1 yr

## 2023-12-26 ENCOUNTER — LAB VISIT (OUTPATIENT)
Dept: LAB | Facility: HOSPITAL | Age: 73
End: 2023-12-26
Payer: MEDICARE

## 2023-12-26 DIAGNOSIS — E21.3 HYPERPARATHYROIDISM: ICD-10-CM

## 2023-12-26 LAB
25(OH)D3+25(OH)D2 SERPL-MCNC: 41 NG/ML (ref 30–96)
CA-I BLDV-SCNC: 1.35 MMOL/L (ref 1.06–1.42)
PTH-INTACT SERPL-MCNC: 87.6 PG/ML (ref 9–77)

## 2023-12-26 PROCEDURE — 82330 ASSAY OF CALCIUM: CPT | Mod: HCNC | Performed by: INTERNAL MEDICINE

## 2023-12-26 PROCEDURE — 82306 VITAMIN D 25 HYDROXY: CPT | Mod: HCNC | Performed by: INTERNAL MEDICINE

## 2023-12-26 PROCEDURE — 36415 COLL VENOUS BLD VENIPUNCTURE: CPT | Mod: HCNC,PO | Performed by: INTERNAL MEDICINE

## 2023-12-26 PROCEDURE — 83970 ASSAY OF PARATHORMONE: CPT | Mod: HCNC | Performed by: INTERNAL MEDICINE

## 2023-12-28 DIAGNOSIS — E03.4 HYPOTHYROIDISM DUE TO ACQUIRED ATROPHY OF THYROID: ICD-10-CM

## 2023-12-28 DIAGNOSIS — M81.0 OSTEOPOROSIS, UNSPECIFIED OSTEOPOROSIS TYPE, UNSPECIFIED PATHOLOGICAL FRACTURE PRESENCE: ICD-10-CM

## 2023-12-28 RX ORDER — LEVOTHYROXINE SODIUM 100 UG/1
100 TABLET ORAL EVERY MORNING
Qty: 90 TABLET | Refills: 3 | OUTPATIENT
Start: 2023-12-28

## 2023-12-28 RX ORDER — ALENDRONATE SODIUM 70 MG/1
TABLET ORAL
Qty: 12 TABLET | Refills: 3 | OUTPATIENT
Start: 2023-12-28

## 2023-12-28 RX ORDER — LEVOTHYROXINE SODIUM 100 UG/1
100 TABLET ORAL EVERY MORNING
Qty: 90 TABLET | Refills: 3 | Status: CANCELLED | OUTPATIENT
Start: 2023-12-28

## 2023-12-28 NOTE — TELEPHONE ENCOUNTER
Refill Routing Note   Medication(s) are not appropriate for processing by [facility] for the following reason(s):        Required labs outdated    [facility] action(s):  Defer   Requires labs : Yes               Appointments  past 12m or future 3m with PCP    Date Provider   Last Visit   12/23/2023 Zia Coburn, DO   Next Visit   Visit date not found Zia Coburn, DO   ED visits in past 90 days: 0        Note composed:2:21 PM 12/28/2023

## 2023-12-28 NOTE — TELEPHONE ENCOUNTER
Provider Staff:  Action required for this patient    Requires labs : CMP & TSH due 2/4/23;   Magnesium & phosphate outdated     Please see care gap opportunities below in Care Due Message.    Thanks!  [facility]     Appointments      Date Provider   Last Visit   12/23/2023 Zia Coburn, DO   Next Visit   Visit date not found Zia Coburn, DO     Refill Decision Note   Lucita Saravia  is requesting a refill authorization.  Brief Assessment and Rationale for Refill:  Quick Discontinue     Medication Therapy Plan:  Receipt confirmed by pharmacy (12/23/2023 10:56 AM CST)      Pharmacist review requested: Yes   Comments:     Note composed:3:22 PM 12/28/2023

## 2023-12-28 NOTE — TELEPHONE ENCOUNTER
----- Message from Nely Esteban sent at 12/28/2023  1:43 PM CST -----  Contact: [phone]  Requesting an RX refill or new RX.  Is this a refill or new RX: refill  RX name and strength (copy/paste from chart):  SYNTHROID 100 mcg tablet  Is this a 30 day or 90 day RX: 30  Pharmacy name and phone # (copy/paste from chart):    [facility] ([place]) - [place] - [address]  [address] [place] [address]  Phone: [phone] Fax: [phone]     The doctors have asked that we provide their patients with the following 2 reminders -- prescription refills can take up to 72 hours, and a friendly reminder that in the future you can use your [facility] account to request refills: yes pt states she was seen not long ago and she states the dr told her this could be filled and she states the pharmacy is telling her it could not please give return call she states they are giving her issues with this please advise

## 2023-12-28 NOTE — TELEPHONE ENCOUNTER
Care Due:                  Date            Visit Type   Department     Provider  --------------------------------------------------------------------------------                                EP -                              PRIMARY      MET INTERNAL  Last Visit: 12-      CARE ([facility])   MEDICINE       Zia Coburn  Next Visit: None Scheduled  None         None Found                                                            Last  Test          Frequency    Reason                     Performed    Due Date  --------------------------------------------------------------------------------    CMP.........  12 months..  alendronate..............  02- 02-    Mg Level....  12 months..  alendronate..............  Not Found    Overdue    Phosphate...  12 months..  alendronate..............  Not Found    Overdue    TSH.........  12 months..  SYNTHROID................  02- 02-    [facility] Embedded Care Due Messages. Reference number: [id number].   12/28/2023 1:48:18 PM CST

## 2023-12-28 NOTE — TELEPHONE ENCOUNTER
----- Message from Jennifer Friend sent at 12/28/2023  1:19 PM CST -----  Contact: Lucita  [phone]  Would like to receive medical advice.      Would they like a call back or a response via [facility]:  call back    Additional information:  Lucita is calling to get a status update on her RX alendronate (FOSAMAX) 70 MG tablet and see if the pt can find out if the pharmacy really received the orders due to the pharmacy never calling the pt. Please call Lucita back for advice      [facility] ([place]) - [place] - [address]  [address] [place] [address]  Phone: [phone] Fax: [phone]

## 2023-12-28 NOTE — TELEPHONE ENCOUNTER
Refill Routing Note   Medication(s) are not appropriate for processing by [facility] for the following reason(s):        Outside of protocol: non-delegated duplicate    [facility] action(s):  Route  Quick Discontinue     Requires labs : Yes    Medication Therapy Plan:  CMP, TSH due 2.4.2024    Pharmacist review requested: Yes     Appointments  past 12m or future 3m with PCP    Date Provider   Last Visit   12/23/2023 Zia Coburn, DO   Next Visit   Visit date not found Zia Coburn, DO   ED visits in past 90 days: 0        Note composed:3:07 PM 12/28/2023

## 2024-03-09 DIAGNOSIS — F02.80 LATE ONSET ALZHEIMER'S DEMENTIA WITHOUT BEHAVIORAL DISTURBANCE: ICD-10-CM

## 2024-03-09 DIAGNOSIS — G30.1 LATE ONSET ALZHEIMER'S DEMENTIA WITHOUT BEHAVIORAL DISTURBANCE: ICD-10-CM

## 2024-03-12 RX ORDER — DONEPEZIL HYDROCHLORIDE 10 MG/1
10 TABLET, FILM COATED ORAL DAILY
Qty: 30 TABLET | Refills: 0 | Status: SHIPPED | OUTPATIENT
Start: 2024-03-12 | End: 2024-04-12

## 2024-04-11 DIAGNOSIS — G30.1 LATE ONSET ALZHEIMER'S DEMENTIA WITHOUT BEHAVIORAL DISTURBANCE: ICD-10-CM

## 2024-04-11 DIAGNOSIS — F02.80 LATE ONSET ALZHEIMER'S DEMENTIA WITHOUT BEHAVIORAL DISTURBANCE: ICD-10-CM

## 2024-04-12 RX ORDER — DONEPEZIL HYDROCHLORIDE 10 MG/1
10 TABLET, FILM COATED ORAL DAILY
Qty: 90 TABLET | Refills: 3 | Status: SHIPPED | OUTPATIENT
Start: 2024-04-12 | End: 2025-04-12

## 2024-04-12 NOTE — TELEPHONE ENCOUNTER
Refill request received for donepezil 10 mg po daily.    Pt diagnosed with Alzheimer's disease but hasn't been seen in neurology clinic or memory clinic.  Will route Rx to PCP, as he is the only provider who has seen the pt in the past two years

## 2024-05-01 ENCOUNTER — PATIENT MESSAGE (OUTPATIENT)
Dept: INTERNAL MEDICINE | Facility: CLINIC | Age: 74
End: 2024-05-01
Payer: MEDICARE

## 2024-05-01 DIAGNOSIS — H69.91 EUSTACHIAN TUBE DYSFUNCTION, RIGHT: ICD-10-CM

## 2024-05-01 NOTE — TELEPHONE ENCOUNTER
Care Due:                  Date            Visit Type   Department     Provider  --------------------------------------------------------------------------------                                EP -                              PRIMARY      MET INTERNAL  Last Visit: 12-      CARE ([facility])   MEDICINE       Zia Coburn  Next Visit: None Scheduled  None         None Found                                                            Last  Test          Frequency    Reason                     Performed    Due Date  --------------------------------------------------------------------------------    CMP.........  12 months..  alendronate..............  Not Found    Overdue    Mg Level....  12 months..  alendronate..............  Not Found    Overdue    Phosphate...  12 months..  alendronate..............  Not Found    Overdue    TSH.........  12 months..  SYNTHROID................  Not Found    Overdue    [employer] Embedded Care Due Messages. Reference number: [id number].   5/01/2024 7:51:58 AM CDT

## 2024-05-01 NOTE — TELEPHONE ENCOUNTER
"Refill Routing Note   Medication(s) are not appropriate for processing by [facility] for the following reason(s):        No active prescription written by provider: pt would like to restart discontinued therapy (previously dc'ed for reason "pt not taking")      [facility] action(s):  Defer     Requires labs : Yes    Medication Therapy Plan:  Labs (CMP, TSH) outdated      Appointments  past 12m or future 3m with PCP    Date Provider   Last Visit   12/23/2023 Zia Coburn, DO   Next Visit   Visit date not found Zia Coburn, DO   ED visits in past 90 days: 0        Note composed:5:28 PM 05/01/2024           "

## 2024-05-01 NOTE — TELEPHONE ENCOUNTER
Refill Routing Note   Medication(s) are not appropriate for processing by [facility] for the following reason(s):        No active prescription written by provider: patient requesting to restart medication    [facility] action(s):  Defer     Requires labs : Yes - CMP, Magnesium, Phosphate, TSH outdated             Appointments  past 12m or future 3m with PCP    Date Provider   Last Visit   12/23/2023 Zia Coburn, DO   Next Visit   Visit date not found Zia Coburn, DO   ED visits in past 90 days: 0        Note composed:6:41 PM 05/01/2024

## 2024-05-02 RX ORDER — LEVOCETIRIZINE DIHYDROCHLORIDE 5 MG/1
5 TABLET, FILM COATED ORAL NIGHTLY
Qty: 90 TABLET | Refills: 3 | Status: SHIPPED | OUTPATIENT
Start: 2024-05-02 | End: 2025-05-02

## 2024-06-28 DIAGNOSIS — E03.4 HYPOTHYROIDISM DUE TO ACQUIRED ATROPHY OF THYROID: ICD-10-CM

## 2024-06-28 RX ORDER — LEVOTHYROXINE SODIUM 100 UG/1
100 TABLET ORAL EVERY MORNING
Qty: 90 TABLET | Refills: 0 | Status: SHIPPED | OUTPATIENT
Start: 2024-06-28

## 2024-06-28 NOTE — TELEPHONE ENCOUNTER
Refill Routing Note   Medication(s) are not appropriate for processing by [facility] for the following reason(s):        Required labs outdated: TSH, T4    [facility] action(s):  Defer     Requires labs : Yes    Medication Therapy Plan:         Appointments  past 12m or future 3m with PCP    Date Provider   Last Visit   12/23/2023 Zia Coburn, DO   Next Visit   Visit date not found Zia Coburn, DO   ED visits in past 90 days: 0        Note composed:2:51 PM 06/28/2024

## 2024-06-28 NOTE — TELEPHONE ENCOUNTER
No care due was identified.  [facility] Embedded Care Due Messages. Reference number: [id number].   6/28/2024 2:39:00 PM CDT

## 2024-07-15 ENCOUNTER — PATIENT MESSAGE (OUTPATIENT)
Dept: ADMINISTRATIVE | Facility: HOSPITAL | Age: 74
End: 2024-07-15
Payer: MEDICARE

## 2024-08-01 ENCOUNTER — PATIENT OUTREACH (OUTPATIENT)
Dept: ADMINISTRATIVE | Facility: HOSPITAL | Age: 74
End: 2024-08-01
Payer: MEDICARE

## 2024-08-01 ENCOUNTER — PATIENT MESSAGE (OUTPATIENT)
Dept: ADMINISTRATIVE | Facility: HOSPITAL | Age: 74
End: 2024-08-01
Payer: MEDICARE

## 2024-08-01 DIAGNOSIS — Z13.820 SCREENING FOR OSTEOPOROSIS: Primary | ICD-10-CM

## 2024-08-01 DIAGNOSIS — M81.0 AGE-RELATED OSTEOPOROSIS WITHOUT CURRENT PATHOLOGICAL FRACTURE: ICD-10-CM

## 2024-08-01 NOTE — PROGRESS NOTES
Chart review done. HM updated. Immunizations reviewed & updated. Care Everywhere updated. Campaign message follow up. Dexa scheduled.

## 2024-08-09 DIAGNOSIS — M81.0 OSTEOPOROSIS, UNSPECIFIED OSTEOPOROSIS TYPE, UNSPECIFIED PATHOLOGICAL FRACTURE PRESENCE: ICD-10-CM

## 2024-08-09 NOTE — TELEPHONE ENCOUNTER
No care due was identified.  [facility] Embedded Care Due Messages. Reference number: [id number].   8/09/2024 4:27:17 PM CDT

## 2024-08-10 RX ORDER — ALENDRONATE SODIUM 70 MG/1
TABLET ORAL
Qty: 12 TABLET | Refills: 3 | Status: SHIPPED | OUTPATIENT
Start: 2024-08-10

## 2024-08-14 DIAGNOSIS — M81.0 OSTEOPOROSIS, UNSPECIFIED OSTEOPOROSIS TYPE, UNSPECIFIED PATHOLOGICAL FRACTURE PRESENCE: ICD-10-CM

## 2024-08-14 RX ORDER — ALENDRONATE SODIUM 70 MG/1
TABLET ORAL
Qty: 12 TABLET | Refills: 3 | Status: SHIPPED | OUTPATIENT
Start: 2024-08-14

## 2024-08-14 NOTE — TELEPHONE ENCOUNTER
No care due was identified.  [facility] Embedded Care Due Messages. Reference number: [id number].   8/14/2024 7:19:25 AM CDT

## 2024-09-26 DIAGNOSIS — E03.4 HYPOTHYROIDISM DUE TO ACQUIRED ATROPHY OF THYROID: ICD-10-CM

## 2024-09-26 NOTE — TELEPHONE ENCOUNTER
Care Due:                  Date            Visit Type   Department     Provider  --------------------------------------------------------------------------------                                EP -                              PRIMARY      MET INTERNAL  Last Visit: 12-      CARE ([facility])   MEDICINE       Zia Coburn  Next Visit: None Scheduled  None         None Found                                                            Last  Test          Frequency    Reason                     Performed    Due Date  --------------------------------------------------------------------------------    Office Visit  12 months..  alendronate..............  12- 12-    CMP.........  12 months..  alendronate..............  Not Found    Overdue    Mg Level....  12 months..  alendronate..............  Not Found    Overdue    Phosphate...  12 months..  alendronate..............  Not Found    Overdue    TSH.........  12 months..  SYNTHROID................  Not Found    Overdue    [employer] Embedded Care Due Messages. Reference number: [id number].   9/26/2024 3:52:46 PM CDT

## 2024-09-27 RX ORDER — LEVOTHYROXINE SODIUM 100 UG/1
100 TABLET ORAL EVERY MORNING
Qty: 90 TABLET | Refills: 3 | Status: SHIPPED | OUTPATIENT
Start: 2024-09-27

## 2024-09-27 NOTE — TELEPHONE ENCOUNTER
Refill Routing Note   Medication(s) are not appropriate for processing by [facility] for the following reason(s):        Required labs outdated    [facility] action(s):  Defer   Requires appointment : Yes     Requires labs : Yes             Appointments  past 12m or future 3m with PCP    Date Provider   Last Visit   Visit date not found Zia Coburn,    Next Visit   Visit date not found Zia Coburn, DO   ED visits in past 90 days: 0        Note composed:8:54 AM 09/27/2024

## 2024-10-01 ENCOUNTER — PATIENT MESSAGE (OUTPATIENT)
Dept: INTERNAL MEDICINE | Facility: CLINIC | Age: 74
End: 2024-10-01
Payer: MEDICARE

## 2024-10-14 ENCOUNTER — APPOINTMENT (OUTPATIENT)
Dept: RADIOLOGY | Facility: CLINIC | Age: 74
End: 2024-10-14
Attending: INTERNAL MEDICINE
Payer: MEDICARE

## 2024-10-14 DIAGNOSIS — M81.0 AGE-RELATED OSTEOPOROSIS WITHOUT CURRENT PATHOLOGICAL FRACTURE: ICD-10-CM

## 2024-10-14 DIAGNOSIS — Z13.820 SCREENING FOR OSTEOPOROSIS: ICD-10-CM

## 2024-10-14 PROCEDURE — 77080 DXA BONE DENSITY AXIAL: CPT | Mod: 26,HCNC,, | Performed by: INTERNAL MEDICINE

## 2024-10-14 PROCEDURE — 77080 DXA BONE DENSITY AXIAL: CPT | Mod: TC,HCNC,PO

## 2024-10-31 ENCOUNTER — TELEPHONE (OUTPATIENT)
Dept: INTERNAL MEDICINE | Facility: CLINIC | Age: 74
End: 2024-10-31
Payer: MEDICARE

## 2024-10-31 DIAGNOSIS — M81.0 OSTEOPOROSIS, UNSPECIFIED OSTEOPOROSIS TYPE, UNSPECIFIED PATHOLOGICAL FRACTURE PRESENCE: Primary | ICD-10-CM

## 2025-01-03 ENCOUNTER — PATIENT OUTREACH (OUTPATIENT)
Dept: ADMINISTRATIVE | Facility: HOSPITAL | Age: 75
End: 2025-01-03
Payer: MEDICARE

## 2025-02-02 ENCOUNTER — PATIENT MESSAGE (OUTPATIENT)
Dept: INTERNAL MEDICINE | Facility: CLINIC | Age: 75
End: 2025-02-02
Payer: MEDICARE

## 2025-02-04 DIAGNOSIS — F02.80 LATE ONSET ALZHEIMER'S DEMENTIA WITHOUT BEHAVIORAL DISTURBANCE: ICD-10-CM

## 2025-02-04 DIAGNOSIS — G30.1 LATE ONSET ALZHEIMER'S DEMENTIA WITHOUT BEHAVIORAL DISTURBANCE: ICD-10-CM

## 2025-02-04 RX ORDER — DONEPEZIL HYDROCHLORIDE 10 MG/1
10 TABLET, FILM COATED ORAL DAILY
Qty: 90 TABLET | Refills: 1 | Status: SHIPPED | OUTPATIENT
Start: 2025-02-04 | End: 2026-02-04

## 2025-03-25 DIAGNOSIS — H69.91 EUSTACHIAN TUBE DYSFUNCTION, RIGHT: ICD-10-CM

## 2025-03-25 RX ORDER — LEVOCETIRIZINE DIHYDROCHLORIDE 5 MG/1
5 TABLET, FILM COATED ORAL NIGHTLY
Qty: 90 TABLET | Refills: 3 | Status: SHIPPED | OUTPATIENT
Start: 2025-03-25

## 2025-03-25 NOTE — TELEPHONE ENCOUNTER
Care Due:                  Date            Visit Type   Department     Provider  --------------------------------------------------------------------------------                                EP -                              PRIMARY      [facility] INTERNAL  Last Visit: 12-      CARE ([facility])   MEDICINE       Zia VINESHAREBONI                              FOLLOWUP/OF  [facility] INTERNAL  Next Visit: 05-      [facility] VISIT   MEDICINE       Zia Coburn                                                            Last  Test          Frequency    Reason                     Performed    Due Date  --------------------------------------------------------------------------------    CMP.........  12 months..  alendronate..............  Not Found    Overdue    Mg Level....  12 months..  alendronate..............  Not Found    Overdue    Phosphate...  12 months..  alendronate..............  Not Found    Overdue    TSH.........  12 months..  SYNTHROID................  Not Found    Overdue    [employer] Embedded Care Due Messages. Reference number: [id number].   3/25/2025 8:04:01 AM CDT

## 2025-03-25 NOTE — TELEPHONE ENCOUNTER
Refill Routing Note   Medication(s) are not appropriate for processing by [facility] for the following reason(s):        Patient not seen by provider within 15 months    [facility] action(s):  Defer     Requires labs : Yes             Appointments  past 12m or future 3m with PCP    Date Provider   Last Visit   12/23/2023 Zia Coburn, DO   Next Visit   5/29/2025 Zia Coburn, DO   ED visits in past 90 days: 0        Note composed:2:17 PM 03/25/2025

## 2025-05-29 ENCOUNTER — OFFICE VISIT (OUTPATIENT)
Dept: INTERNAL MEDICINE | Facility: CLINIC | Age: 75
End: 2025-05-29
Payer: MEDICARE

## 2025-05-29 VITALS
HEIGHT: 66 IN | BODY MASS INDEX: 27.77 KG/M2 | WEIGHT: 172.81 LBS | DIASTOLIC BLOOD PRESSURE: 84 MMHG | RESPIRATION RATE: 18 BRPM | SYSTOLIC BLOOD PRESSURE: 138 MMHG | HEART RATE: 67 BPM | OXYGEN SATURATION: 96 % | TEMPERATURE: 98 F

## 2025-05-29 DIAGNOSIS — F02.80 ALZHEIMER'S DISEASE: ICD-10-CM

## 2025-05-29 DIAGNOSIS — E03.9 HYPOTHYROIDISM, UNSPECIFIED TYPE: ICD-10-CM

## 2025-05-29 DIAGNOSIS — E21.3 HYPERPARATHYROIDISM: ICD-10-CM

## 2025-05-29 DIAGNOSIS — E78.5 DYSLIPIDEMIA: ICD-10-CM

## 2025-05-29 DIAGNOSIS — Z12.31 ENCOUNTER FOR SCREENING MAMMOGRAM FOR BREAST CANCER: ICD-10-CM

## 2025-05-29 DIAGNOSIS — Z00.00 ANNUAL PHYSICAL EXAM: Primary | ICD-10-CM

## 2025-05-29 DIAGNOSIS — M81.0 OSTEOPOROSIS, UNSPECIFIED OSTEOPOROSIS TYPE, UNSPECIFIED PATHOLOGICAL FRACTURE PRESENCE: ICD-10-CM

## 2025-05-29 DIAGNOSIS — G30.9 ALZHEIMER'S DISEASE: ICD-10-CM

## 2025-05-29 PROCEDURE — 3008F BODY MASS INDEX DOCD: CPT | Mod: CPTII,HCNC,S$GLB, | Performed by: INTERNAL MEDICINE

## 2025-05-29 PROCEDURE — 99999 PR PBB SHADOW E&M-EST. PATIENT-LVL IV: CPT | Mod: PBBFAC,HCNC,, | Performed by: INTERNAL MEDICINE

## 2025-05-29 PROCEDURE — 1157F ADVNC CARE PLAN IN RCRD: CPT | Mod: CPTII,HCNC,S$GLB, | Performed by: INTERNAL MEDICINE

## 2025-05-29 PROCEDURE — 1126F AMNT PAIN NOTED NONE PRSNT: CPT | Mod: CPTII,HCNC,S$GLB, | Performed by: INTERNAL MEDICINE

## 2025-05-29 PROCEDURE — 1160F RVW MEDS BY RX/DR IN RCRD: CPT | Mod: CPTII,HCNC,S$GLB, | Performed by: INTERNAL MEDICINE

## 2025-05-29 PROCEDURE — 99397 PER PM REEVAL EST PAT 65+ YR: CPT | Mod: HCNC,S$GLB,, | Performed by: INTERNAL MEDICINE

## 2025-05-29 PROCEDURE — 3075F SYST BP GE 130 - 139MM HG: CPT | Mod: CPTII,HCNC,S$GLB, | Performed by: INTERNAL MEDICINE

## 2025-05-29 PROCEDURE — 3288F FALL RISK ASSESSMENT DOCD: CPT | Mod: CPTII,HCNC,S$GLB, | Performed by: INTERNAL MEDICINE

## 2025-05-29 PROCEDURE — 3079F DIAST BP 80-89 MM HG: CPT | Mod: CPTII,HCNC,S$GLB, | Performed by: INTERNAL MEDICINE

## 2025-05-29 PROCEDURE — 1101F PT FALLS ASSESS-DOCD LE1/YR: CPT | Mod: CPTII,HCNC,S$GLB, | Performed by: INTERNAL MEDICINE

## 2025-05-29 PROCEDURE — 1159F MED LIST DOCD IN RCRD: CPT | Mod: CPTII,HCNC,S$GLB, | Performed by: INTERNAL MEDICINE

## 2025-05-29 NOTE — PROGRESS NOTES
Subjective     Patient ID: Lucita Saravia is a 75 y.o. female.    Chief Complaint: Annual Exam    HPI  75 y.o. Female here for annual exam.      Vaccines: Influenza (declined); Tetanus (2023); PNA (current); Shingrix (will consider)  Eye exam: declined  Mammogram: needs  Gyn exam: declined  Colonoscopy: declined  DEXA: 10/24     Exercise: walks  Diet: regular      Past Medical History:  No date: Alzheimer's disease, unspecified (CODE)  No date: Female infertility NEC  No date: History of degenerative disc disease      Comment:  neck  No date: Hyperparathyroidism, unspecified  No date: Hypothyroidism  Past Surgical History:  No date: APPENDECTOMY  No date: EYE SURGERY      Comment:  LASIK  No date: LASIK  No date: PELVIC LAPAROSCOPY      Comment:  fertillity  No date: TONSILLECTOMY, ADENOIDECTOMY  No date: TOTAL ABDOMINAL HYSTERECTOMY      Comment:  endometrisosis  Social History    Socioeconomic History      Marital status:     Occupational History      Occupation: disability for neck pain    Tobacco Use      Smoking status: Never      Smokeless tobacco: Never    Substance and Sexual Activity      Alcohol use: Yes        Comment: rare      Drug use: No      Sexual activity: Not Currently    Social History Narrative      Single, lives alone, retired -but works PT serving at [employer]    Social Drivers of Health  Financial Resource Strain: High Risk (3/2/2025)      Overall Financial Resource Strain (CARDIA)          Difficulty of Paying Living Expenses: Very hard  Food Insecurity: Food Insecurity Present (3/2/2025)      Hunger Vital Sign          Worried About Running Out of Food in the Last Year: Often true          Ran Out of Food in the Last Year: Often true  Transportation Needs: Unknown (3/2/2025)      PRAPARE - Transportation          Lack of Transportation (Medical): No          Lack of Transportation (Non-Medical): Patient declined  Physical Activity: Inactive (3/2/2025)      Exercise Vital Sign           Days of Exercise per Week: 0 days          Minutes of Exercise per Session: 0 min  Stress: Stress Concern Present (3/2/2025)      [employer] - Occupational Stress Questionnaire          Feeling of Stress : Very much  Housing Stability: Unknown (3/2/2025)      Housing Stability Vital Sign          Unable to Pay for Housing in the Last Year: Patient declined          Number of Times Moved in the Last Year: 0          Homeless in the Last Year: No  Review of patient's allergies indicates:  No Known Allergies  Lucita Saravia had no medications administered during this visit.  Review of Systems   Constitutional:  Positive for activity change. Negative for appetite change, chills, diaphoresis, fatigue, fever and unexpected weight change.   HENT:  Negative for nasal congestion, hearing loss, mouth sores, postnasal drip, rhinorrhea, sinus pressure/congestion, sneezing, sore throat, trouble swallowing and voice change.    Eyes:  Negative for pain, discharge and visual disturbance.   Respiratory:  Negative for cough, chest tightness, shortness of breath and wheezing.    Cardiovascular:  Negative for chest pain, palpitations and leg swelling.   Gastrointestinal:  Negative for abdominal pain, blood in stool, constipation, diarrhea, nausea and vomiting.   Endocrine: Negative for cold intolerance, heat intolerance, polydipsia and polyuria.   Genitourinary:  Negative for difficulty urinating, dysuria, frequency, hematuria, menstrual problem and urgency.   Musculoskeletal:  Negative for arthralgias, joint swelling, myalgias and neck pain.   Integumentary:  Negative for rash and wound.   Allergic/Immunologic: Negative for environmental allergies and food allergies.   Neurological:  Positive for memory loss. Negative for dizziness, tremors, seizures, syncope, weakness, light-headedness and headaches.   Hematological:  Negative for adenopathy. Does not bruise/bleed easily.   Psychiatric/Behavioral:   Positive for confusion and dysphoric mood. Negative for sleep disturbance. The patient is not nervous/anxious.           Objective     Physical Exam  Vitals and nursing note reviewed.   Constitutional:       General: She is not in acute distress.     Appearance: Normal appearance. She is well-developed. She is not diaphoretic.   HENT:      Head: Normocephalic and atraumatic.      Right Ear: External ear normal.      Left Ear: External ear normal.      Nose: Nose normal.      Mouth/Throat:      Pharynx: No oropharyngeal exudate.   Eyes:      General: No scleral icterus.        Right eye: No discharge.         Left eye: No discharge.      Conjunctiva/sclera: Conjunctivae normal.      Pupils: Pupils are equal, round, and reactive to light.   Neck:      Thyroid: No thyromegaly.      Vascular: No JVD.   Cardiovascular:      Rate and Rhythm: Normal rate and regular rhythm.      Pulses: Normal pulses.      Heart sounds: Normal heart sounds. No murmur heard.  Pulmonary:      Effort: Pulmonary effort is normal. No respiratory distress.      Breath sounds: Normal breath sounds. No wheezing, rhonchi or rales.   Chest:      Chest wall: No tenderness.   Abdominal:      General: Bowel sounds are normal. There is no distension.      Palpations: Abdomen is soft.      Tenderness: There is no abdominal tenderness. There is no guarding or rebound.   Musculoskeletal:      Cervical back: Neck supple.      Right lower leg: No edema.      Left lower leg: No edema.   Lymphadenopathy:      Cervical: No cervical adenopathy.   Skin:     General: Skin is warm and dry.      Coloration: Skin is not pale.      Findings: No rash.   Neurological:      General: No focal deficit present.      Mental Status: She is alert and oriented to person, place, and time.      Gait: Gait normal.   Psychiatric:         Behavior: Behavior normal.         Thought Content: Thought content normal.         Judgment: Judgment normal.            Assessment and Plan      1. Annual physical exam  -     Urinalysis; Future; Expected date: 05/29/2025    2. Alzheimer's disease    3. Hypothyroidism, unspecified type  -     TSH; Future; Expected date: 05/29/2025    4. Osteoporosis, unspecified osteoporosis type, unspecified pathological fracture presence    5. Hyperparathyroidism  -     Vitamin D; Future; Expected date: 05/29/2025  -     PTH, Intact; Future; Expected date: 05/29/2025  -     Calcium, Ionized; Future; Expected date: 05/29/2025    6. Encounter for screening mammogram for breast cancer  -     Mammo Digital Screening Bilat w/ Gabino (XPD); Future; Expected date: 05/29/2025    7. Dyslipidemia  -     CBC auto differential; Future; Expected date: 05/29/2025  -     Comprehensive metabolic panel; Future; Expected date: 05/29/2025  -     Lipid panel; Future; Expected date: 05/29/2025        Blood work ordered     Alzheimer's dz- stable on Aricept     Hypothyroidism- stable on Synthroid      Hyperparathyroidism- stable, will f/u with Endo     Osteoporosis- currently on Fosamax   -pt seeing Endo on 7/31/25 to discuss alternative tx options      F/u in 1 yr

## 2025-05-30 ENCOUNTER — LAB VISIT (OUTPATIENT)
Dept: LAB | Facility: HOSPITAL | Age: 75
End: 2025-05-30
Payer: MEDICARE

## 2025-05-30 ENCOUNTER — TELEPHONE (OUTPATIENT)
Dept: INTERNAL MEDICINE | Facility: CLINIC | Age: 75
End: 2025-05-30
Payer: MEDICARE

## 2025-05-30 DIAGNOSIS — Z00.00 ANNUAL PHYSICAL EXAM: ICD-10-CM

## 2025-05-30 DIAGNOSIS — E21.3 HYPERPARATHYROIDISM: ICD-10-CM

## 2025-05-30 DIAGNOSIS — R79.9 ABNORMAL FINDING OF BLOOD CHEMISTRY, UNSPECIFIED: ICD-10-CM

## 2025-05-30 DIAGNOSIS — E78.5 DYSLIPIDEMIA: ICD-10-CM

## 2025-05-30 DIAGNOSIS — E03.9 HYPOTHYROIDISM, UNSPECIFIED TYPE: ICD-10-CM

## 2025-05-30 DIAGNOSIS — E21.3 HYPERPARATHYROIDISM: Primary | ICD-10-CM

## 2025-05-30 LAB
25(OH)D3+25(OH)D2 SERPL-MCNC: 44 NG/ML (ref 30–96)
ABSOLUTE EOSINOPHIL (OHS): 0.13 K/UL
ABSOLUTE MONOCYTE (OHS): 0.5 K/UL (ref 0.3–1)
ABSOLUTE NEUTROPHIL COUNT (OHS): 5.87 K/UL (ref 1.8–7.7)
ALBUMIN SERPL BCP-MCNC: 4.4 G/DL (ref 3.5–5.2)
ALP SERPL-CCNC: 110 UNIT/L (ref 40–150)
ALT SERPL W/O P-5'-P-CCNC: 16 UNIT/L (ref 10–44)
ANION GAP (OHS): 14 MMOL/L (ref 8–16)
AST SERPL-CCNC: 25 UNIT/L (ref 11–45)
BASOPHILS # BLD AUTO: 0.06 K/UL
BASOPHILS NFR BLD AUTO: 0.7 %
BILIRUB SERPL-MCNC: 0.4 MG/DL (ref 0.1–1)
BUN SERPL-MCNC: 16 MG/DL (ref 8–23)
CA-I BLD-SCNC: 1.41 MMOL/L (ref 1.06–1.42)
CALCIUM SERPL-MCNC: 11.3 MG/DL (ref 8.7–10.5)
CHLORIDE SERPL-SCNC: 107 MMOL/L (ref 95–110)
CHOLEST SERPL-MCNC: 199 MG/DL (ref 120–199)
CHOLEST/HDLC SERPL: 3.3 {RATIO} (ref 2–5)
CO2 SERPL-SCNC: 21 MMOL/L (ref 23–29)
CREAT SERPL-MCNC: 0.9 MG/DL (ref 0.5–1.4)
ERYTHROCYTE [DISTWIDTH] IN BLOOD BY AUTOMATED COUNT: 13.6 % (ref 11.5–14.5)
GFR SERPLBLD CREATININE-BSD FMLA CKD-EPI: >60 ML/MIN/1.73/M2
GLUCOSE SERPL-MCNC: 110 MG/DL (ref 70–110)
HCT VFR BLD AUTO: 46.4 % (ref 37–48.5)
HDLC SERPL-MCNC: 61 MG/DL (ref 40–75)
HDLC SERPL: 30.7 % (ref 20–50)
HGB BLD-MCNC: 14.5 GM/DL (ref 12–16)
IMM GRANULOCYTES # BLD AUTO: 0.03 K/UL (ref 0–0.04)
IMM GRANULOCYTES NFR BLD AUTO: 0.4 % (ref 0–0.5)
LDLC SERPL CALC-MCNC: 95.8 MG/DL (ref 63–159)
LYMPHOCYTES # BLD AUTO: 1.87 K/UL (ref 1–4.8)
MCH RBC QN AUTO: 27.8 PG (ref 27–31)
MCHC RBC AUTO-ENTMCNC: 31.3 G/DL (ref 32–36)
MCV RBC AUTO: 89 FL (ref 82–98)
NONHDLC SERPL-MCNC: 138 MG/DL
NUCLEATED RBC (/100WBC) (OHS): 0 /100 WBC
PLATELET # BLD AUTO: 283 K/UL (ref 150–450)
PMV BLD AUTO: 10.1 FL (ref 9.2–12.9)
POTASSIUM SERPL-SCNC: 4.1 MMOL/L (ref 3.5–5.1)
PROT SERPL-MCNC: 7.5 GM/DL (ref 6–8.4)
PTH-INTACT SERPL-MCNC: 61.7 PG/ML (ref 9–77)
RBC # BLD AUTO: 5.21 M/UL (ref 4–5.4)
RELATIVE EOSINOPHIL (OHS): 1.5 %
RELATIVE LYMPHOCYTE (OHS): 22.1 % (ref 18–48)
RELATIVE MONOCYTE (OHS): 5.9 % (ref 4–15)
RELATIVE NEUTROPHIL (OHS): 69.4 % (ref 38–73)
SODIUM SERPL-SCNC: 142 MMOL/L (ref 136–145)
TRIGL SERPL-MCNC: 211 MG/DL (ref 30–150)
TSH SERPL-ACNC: 1.41 UIU/ML (ref 0.4–4)
WBC # BLD AUTO: 8.46 K/UL (ref 3.9–12.7)

## 2025-05-30 PROCEDURE — 84443 ASSAY THYROID STIM HORMONE: CPT | Mod: HCNC

## 2025-05-30 PROCEDURE — 85025 COMPLETE CBC W/AUTO DIFF WBC: CPT | Mod: HCNC

## 2025-05-30 PROCEDURE — 82306 VITAMIN D 25 HYDROXY: CPT | Mod: HCNC

## 2025-05-30 PROCEDURE — 82330 ASSAY OF CALCIUM: CPT | Mod: HCNC

## 2025-05-30 PROCEDURE — 36415 COLL VENOUS BLD VENIPUNCTURE: CPT | Mod: HCNC,PO

## 2025-05-30 PROCEDURE — 80061 LIPID PANEL: CPT | Mod: HCNC

## 2025-05-30 PROCEDURE — 80053 COMPREHEN METABOLIC PANEL: CPT | Mod: HCNC

## 2025-05-30 PROCEDURE — 83970 ASSAY OF PARATHORMONE: CPT | Mod: HCNC

## 2025-06-02 ENCOUNTER — RESULTS FOLLOW-UP (OUTPATIENT)
Dept: INTERNAL MEDICINE | Facility: CLINIC | Age: 75
End: 2025-06-02
Payer: MEDICARE

## 2025-06-02 DIAGNOSIS — R31.9 HEMATURIA, UNSPECIFIED TYPE: Primary | ICD-10-CM

## 2025-06-03 ENCOUNTER — LAB VISIT (OUTPATIENT)
Dept: LAB | Facility: HOSPITAL | Age: 75
End: 2025-06-03
Payer: MEDICARE

## 2025-06-03 DIAGNOSIS — Z00.00 ANNUAL PHYSICAL EXAM: ICD-10-CM

## 2025-06-03 LAB
BACTERIA #/AREA URNS AUTO: ABNORMAL /HPF
BILIRUB UR QL STRIP.AUTO: NEGATIVE
CLARITY UR: ABNORMAL
COLOR UR AUTO: YELLOW
GLUCOSE UR QL STRIP: NEGATIVE
HGB UR QL STRIP: ABNORMAL
KETONES UR QL STRIP: NEGATIVE
LEUKOCYTE ESTERASE UR QL STRIP: ABNORMAL
MICROSCOPIC COMMENT: ABNORMAL
NITRITE UR QL STRIP: NEGATIVE
PH UR STRIP: 6 [PH]
PROT UR QL STRIP: ABNORMAL
RBC #/AREA URNS AUTO: 28 /HPF (ref 0–4)
SP GR UR STRIP: >=1.03
SQUAMOUS #/AREA URNS AUTO: 1 /HPF
UROBILINOGEN UR STRIP-ACNC: NEGATIVE EU/DL
WBC #/AREA URNS AUTO: >100 /HPF (ref 0–5)

## 2025-06-03 PROCEDURE — 81003 URINALYSIS AUTO W/O SCOPE: CPT | Mod: HCNC

## 2025-06-10 ENCOUNTER — LAB VISIT (OUTPATIENT)
Dept: LAB | Facility: HOSPITAL | Age: 75
End: 2025-06-10
Attending: INTERNAL MEDICINE
Payer: MEDICARE

## 2025-06-10 DIAGNOSIS — R31.9 HEMATURIA, UNSPECIFIED TYPE: ICD-10-CM

## 2025-06-10 LAB
BACTERIA #/AREA URNS AUTO: ABNORMAL /HPF
BILIRUB UR QL STRIP.AUTO: NEGATIVE
CLARITY UR: ABNORMAL
COLOR UR AUTO: YELLOW
GLUCOSE UR QL STRIP: NEGATIVE
HGB UR QL STRIP: ABNORMAL
HYALINE CASTS UR QL AUTO: 0 /LPF (ref 0–1)
KETONES UR QL STRIP: NEGATIVE
LEUKOCYTE ESTERASE UR QL STRIP: ABNORMAL
MICROSCOPIC COMMENT: ABNORMAL
NITRITE UR QL STRIP: POSITIVE
NON-SQ EPI CELLS #/AREA URNS AUTO: 4 /HPF
PH UR STRIP: 6 [PH]
PROT UR QL STRIP: ABNORMAL
RBC #/AREA URNS AUTO: 94 /HPF (ref 0–4)
SP GR UR STRIP: 1.03
SQUAMOUS #/AREA URNS AUTO: 4 /HPF
UROBILINOGEN UR STRIP-ACNC: NEGATIVE EU/DL
WBC #/AREA URNS AUTO: >100 /HPF (ref 0–5)
YEAST UR QL AUTO: ABNORMAL /HPF

## 2025-06-10 PROCEDURE — 87086 URINE CULTURE/COLONY COUNT: CPT | Mod: HCNC

## 2025-06-10 PROCEDURE — 81001 URINALYSIS AUTO W/SCOPE: CPT | Mod: HCNC

## 2025-06-12 ENCOUNTER — RESULTS FOLLOW-UP (OUTPATIENT)
Dept: INTERNAL MEDICINE | Facility: CLINIC | Age: 75
End: 2025-06-12
Payer: MEDICARE

## 2025-06-12 DIAGNOSIS — N30.00 ACUTE CYSTITIS WITHOUT HEMATURIA: Primary | ICD-10-CM

## 2025-06-12 RX ORDER — NITROFURANTOIN 25; 75 MG/1; MG/1
100 CAPSULE ORAL 2 TIMES DAILY
Qty: 14 CAPSULE | Refills: 0 | Status: SHIPPED | OUTPATIENT
Start: 2025-06-12

## 2025-06-13 LAB — BACTERIA UR CULT: ABNORMAL

## 2025-06-13 RX ORDER — CEFDINIR 300 MG/1
300 CAPSULE ORAL 2 TIMES DAILY
Qty: 14 CAPSULE | Refills: 0 | Status: SHIPPED | OUTPATIENT
Start: 2025-06-13

## 2025-07-10 ENCOUNTER — PATIENT MESSAGE (OUTPATIENT)
Dept: INTERNAL MEDICINE | Facility: CLINIC | Age: 75
End: 2025-07-10
Payer: MEDICARE

## 2025-07-10 DIAGNOSIS — R82.90 FOUL SMELLING URINE: Primary | ICD-10-CM

## 2025-07-10 NOTE — TELEPHONE ENCOUNTER
Caregiver states that pt continues with frequent urination, believes that she still has a UTI    States that pt wouldn't give a urine sample, would like assistance with next steps    LOV with Zia Coburn DO , 5/29/2025    Urinalysis last draw 6/10/2025

## 2025-07-11 NOTE — TELEPHONE ENCOUNTER
I need to get a sample for testing as I am not sure if she has a UTI or not. I don's want to her her antibiotics if not needed. Urine testing ordered.

## 2025-07-19 DIAGNOSIS — M81.0 OSTEOPOROSIS, UNSPECIFIED OSTEOPOROSIS TYPE, UNSPECIFIED PATHOLOGICAL FRACTURE PRESENCE: ICD-10-CM

## 2025-07-19 NOTE — TELEPHONE ENCOUNTER
No care due was identified.  [facility] Embedded Care Due Messages. Reference number: [id number].   7/19/2025 1:23:42 PM CDT

## 2025-07-21 RX ORDER — ALENDRONATE SODIUM 70 MG/1
TABLET ORAL
Qty: 12 TABLET | Refills: 3 | Status: SHIPPED | OUTPATIENT
Start: 2025-07-21

## 2025-07-21 NOTE — TELEPHONE ENCOUNTER
Refill Routing Note   Medication(s) are not appropriate for processing by [facility] for the following reason(s):        Outside of protocol    [facility] action(s):  Route             Appointments  past 12m or future 3m with PCP    Date Provider   Last Visit   5/29/2025 Zia Coburn, DO   Next Visit   Visit date not found Zia Coburn,    ED visits in past 90 days: 0        Note composed:1:05 PM 07/21/2025

## 2025-08-06 DIAGNOSIS — F02.80 LATE ONSET ALZHEIMER'S DEMENTIA WITHOUT BEHAVIORAL DISTURBANCE: ICD-10-CM

## 2025-08-06 DIAGNOSIS — G30.1 LATE ONSET ALZHEIMER'S DEMENTIA WITHOUT BEHAVIORAL DISTURBANCE: ICD-10-CM

## 2025-08-06 RX ORDER — DONEPEZIL HYDROCHLORIDE 10 MG/1
10 TABLET, FILM COATED ORAL
Qty: 90 TABLET | Refills: 1 | Status: SHIPPED | OUTPATIENT
Start: 2025-08-06